# Patient Record
Sex: FEMALE | Race: BLACK OR AFRICAN AMERICAN | NOT HISPANIC OR LATINO | Employment: OTHER | RURAL
[De-identification: names, ages, dates, MRNs, and addresses within clinical notes are randomized per-mention and may not be internally consistent; named-entity substitution may affect disease eponyms.]

---

## 2023-01-15 ENCOUNTER — HOSPITAL ENCOUNTER (EMERGENCY)
Facility: HOSPITAL | Age: 58
Discharge: HOME | End: 2023-01-15
Attending: EMERGENCY MEDICINE
Payer: MEDICARE

## 2023-01-15 VITALS
WEIGHT: 230 LBS | OXYGEN SATURATION: 97 % | HEART RATE: 89 BPM | HEIGHT: 70 IN | DIASTOLIC BLOOD PRESSURE: 83 MMHG | BODY MASS INDEX: 32.93 KG/M2 | TEMPERATURE: 99 F | RESPIRATION RATE: 18 BRPM | SYSTOLIC BLOOD PRESSURE: 118 MMHG

## 2023-01-15 DIAGNOSIS — N39.0 BACTERIAL URINARY INFECTION: ICD-10-CM

## 2023-01-15 DIAGNOSIS — A49.9 BACTERIAL URINARY INFECTION: ICD-10-CM

## 2023-01-15 DIAGNOSIS — L03.211 FACIAL CELLULITIS: Primary | ICD-10-CM

## 2023-01-15 LAB
ALBUMIN SERPL BCP-MCNC: 3.4 G/DL (ref 3.5–5)
ALBUMIN/GLOB SERPL: 0.8 {RATIO}
ALP SERPL-CCNC: 97 U/L (ref 46–118)
ALT SERPL W P-5'-P-CCNC: 150 U/L (ref 13–56)
ANION GAP SERPL CALCULATED.3IONS-SCNC: 14 MMOL/L (ref 7–16)
AST SERPL W P-5'-P-CCNC: 127 U/L (ref 15–37)
BACTERIA #/AREA URNS HPF: ABNORMAL /HPF
BASOPHILS # BLD AUTO: 0 K/UL (ref 0–0.2)
BASOPHILS NFR BLD AUTO: 0 % (ref 0–1)
BILIRUB SERPL-MCNC: 0.7 MG/DL (ref ?–1.2)
BILIRUB UR QL STRIP: ABNORMAL
BUN SERPL-MCNC: 12 MG/DL (ref 7–18)
BUN/CREAT SERPL: 10 (ref 6–20)
CALCIUM SERPL-MCNC: 8.4 MG/DL (ref 8.5–10.1)
CHLORIDE SERPL-SCNC: 104 MMOL/L (ref 98–107)
CLARITY UR: CLEAR
CO2 SERPL-SCNC: 25 MMOL/L (ref 21–32)
COLOR UR: ABNORMAL
CREAT SERPL-MCNC: 1.2 MG/DL (ref 0.55–1.02)
DIFFERENTIAL METHOD BLD: ABNORMAL
EGFR (NO RACE VARIABLE) (RUSH/TITUS): 54 ML/MIN/1.73M²
EOSINOPHIL # BLD AUTO: 0.01 K/UL (ref 0–0.5)
EOSINOPHIL NFR BLD AUTO: 0.1 % (ref 1–4)
ERYTHROCYTE [DISTWIDTH] IN BLOOD BY AUTOMATED COUNT: 16.1 % (ref 11.5–14.5)
GLOBULIN SER-MCNC: 4.4 G/DL (ref 2–4)
GLUCOSE SERPL-MCNC: 122 MG/DL (ref 74–106)
GLUCOSE UR STRIP-MCNC: NEGATIVE MG/DL
HCT VFR BLD AUTO: 43.3 % (ref 38–47)
HGB BLD-MCNC: 14 G/DL (ref 12–16)
IMM GRANULOCYTES # BLD AUTO: 0.01 K/UL (ref 0–0.04)
IMM GRANULOCYTES NFR BLD: 0.1 % (ref 0–0.4)
KETONES UR STRIP-SCNC: NEGATIVE MG/DL
LACTATE SERPL-SCNC: 1.3 MMOL/L (ref 0.4–2)
LACTATE SERPL-SCNC: 3 MMOL/L (ref 0.4–2)
LEUKOCYTE ESTERASE UR QL STRIP: NEGATIVE
LYMPHOCYTES # BLD AUTO: 0.64 K/UL (ref 1–4.8)
LYMPHOCYTES NFR BLD AUTO: 8.7 % (ref 27–41)
MCH RBC QN AUTO: 26.3 PG (ref 27–31)
MCHC RBC AUTO-ENTMCNC: 32.3 G/DL (ref 32–36)
MCV RBC AUTO: 81.2 FL (ref 80–96)
MONOCYTES # BLD AUTO: 0.68 K/UL (ref 0–0.8)
MONOCYTES NFR BLD AUTO: 9.2 % (ref 2–6)
MPC BLD CALC-MCNC: 10.4 FL (ref 9.4–12.4)
NEUTROPHILS # BLD AUTO: 6.05 K/UL (ref 1.8–7.7)
NEUTROPHILS NFR BLD AUTO: 81.9 % (ref 53–65)
NITRITE UR QL STRIP: POSITIVE
PH UR STRIP: 5.5 PH UNITS
PLATELET # BLD AUTO: 254 K/UL (ref 150–400)
POTASSIUM SERPL-SCNC: 3.7 MMOL/L (ref 3.5–5.1)
PROT SERPL-MCNC: 7.8 G/DL (ref 6.4–8.2)
PROT UR QL STRIP: 100
RBC # BLD AUTO: 5.33 M/UL (ref 4.2–5.4)
RBC # UR STRIP: ABNORMAL /UL
RBC #/AREA URNS HPF: ABNORMAL /HPF
SODIUM SERPL-SCNC: 139 MMOL/L (ref 136–145)
SP GR UR STRIP: >=1.03
SQUAMOUS #/AREA URNS LPF: ABNORMAL /LPF
UROBILINOGEN UR STRIP-ACNC: 1 MG/DL
WBC # BLD AUTO: 7.39 K/UL (ref 4.5–11)
WBC #/AREA URNS HPF: ABNORMAL /HPF

## 2023-01-15 PROCEDURE — 63600175 PHARM REV CODE 636 W HCPCS: Performed by: EMERGENCY MEDICINE

## 2023-01-15 PROCEDURE — 96365 THER/PROPH/DIAG IV INF INIT: CPT

## 2023-01-15 PROCEDURE — 85025 COMPLETE CBC W/AUTO DIFF WBC: CPT | Performed by: EMERGENCY MEDICINE

## 2023-01-15 PROCEDURE — 99284 EMERGENCY DEPT VISIT MOD MDM: CPT | Performed by: EMERGENCY MEDICINE

## 2023-01-15 PROCEDURE — 99284 EMERGENCY DEPT VISIT MOD MDM: CPT | Mod: 25

## 2023-01-15 PROCEDURE — 25000003 PHARM REV CODE 250: Performed by: EMERGENCY MEDICINE

## 2023-01-15 PROCEDURE — 80053 COMPREHEN METABOLIC PANEL: CPT | Performed by: EMERGENCY MEDICINE

## 2023-01-15 PROCEDURE — 81001 URINALYSIS AUTO W/SCOPE: CPT | Performed by: EMERGENCY MEDICINE

## 2023-01-15 PROCEDURE — 83605 ASSAY OF LACTIC ACID: CPT | Performed by: EMERGENCY MEDICINE

## 2023-01-15 RX ORDER — ACETAMINOPHEN 325 MG/1
650 TABLET ORAL
Status: COMPLETED | OUTPATIENT
Start: 2023-01-15 | End: 2023-01-15

## 2023-01-15 RX ORDER — ROSUVASTATIN CALCIUM 5 MG/1
5 TABLET, COATED ORAL DAILY
COMMUNITY
Start: 2022-12-20

## 2023-01-15 RX ORDER — PAROXETINE HYDROCHLORIDE 20 MG/1
20 TABLET, FILM COATED ORAL
COMMUNITY
Start: 2022-12-20 | End: 2023-07-09 | Stop reason: SDUPTHER

## 2023-01-15 RX ORDER — METOPROLOL TARTRATE 25 MG/1
25 TABLET, FILM COATED ORAL 2 TIMES DAILY
COMMUNITY

## 2023-01-15 RX ORDER — LOSARTAN POTASSIUM 25 MG/1
25 TABLET ORAL DAILY
COMMUNITY
Start: 2022-12-20

## 2023-01-15 RX ORDER — CLONAZEPAM 1 MG/1
1 TABLET ORAL NIGHTLY PRN
Status: ON HOLD | COMMUNITY
Start: 2022-12-20 | End: 2023-09-01 | Stop reason: SDUPTHER

## 2023-01-15 RX ORDER — SULFAMETHOXAZOLE AND TRIMETHOPRIM 800; 160 MG/1; MG/1
1 TABLET ORAL 2 TIMES DAILY
Qty: 14 TABLET | Refills: 0 | Status: SHIPPED | OUTPATIENT
Start: 2023-01-15 | End: 2023-01-22

## 2023-01-15 RX ADMIN — ACETAMINOPHEN 650 MG: 325 TABLET ORAL at 10:01

## 2023-01-15 RX ADMIN — SODIUM CHLORIDE 1000 ML: 9 INJECTION, SOLUTION INTRAVENOUS at 11:01

## 2023-01-15 RX ADMIN — CEFTRIAXONE SODIUM 1 G: 1 INJECTION, POWDER, FOR SOLUTION INTRAMUSCULAR; INTRAVENOUS at 11:01

## 2023-01-15 NOTE — ED NOTES
Md reviewed all findings and re examined - md spoke with pt and brother- orders for discharge received

## 2023-01-15 NOTE — ED TRIAGE NOTES
PT TO ER WITH C/O R UPPER LIP EDEMA THIS AM - INNER UPPER LIP NOTED WITH DARK BLACK SCAB AND ROTTEN TOOTH NOTED- PT WITH FEVER 101  ON ARRIVAL- R NARES RED WITH SMALL SORE NOTED -PT BROTHER WITH HER AT THIS TIME- BROTHER STATES PT SOME MENTAL ISSUES AND HAS A DETACHED RETINA AND CANNOT SEE WELL- STATES SHE HAS CARETAKER AT HOME

## 2023-01-15 NOTE — ED NOTES
Pt states she is feeling better at this time- continuing to monitor and assist pt- temp down- heart rate improving- pt brother remains with pt

## 2023-01-15 NOTE — ED PROVIDER NOTES
Encounter Date: 1/15/2023       History     Chief Complaint   Patient presents with    Facial Swelling     C/o r upper lip edema this am      Patient presents with report of swelling and erythema of the right upper lip area.  Noted to this morning.  Patient has a mental disability and is mostly blind according to her brother who provides the history.  Patient also has a sore under her right nostril that has been present for at least several days that she is been picking at.  Patient has a caretaker at home.  Has fever this morning 101.     Review of patient's allergies indicates:  No Known Allergies  Past Medical History:   Diagnosis Date    Detached retina     Essential (primary) hypertension     Other specified mental disorders due to known physiological condition      Past Surgical History:   Procedure Laterality Date    HYSTERECTOMY       History reviewed. No pertinent family history.  Social History     Tobacco Use    Smoking status: Never    Smokeless tobacco: Never   Substance Use Topics    Alcohol use: Never     Review of Systems   Constitutional:  Positive for fever. Negative for activity change, appetite change, chills, diaphoresis and fatigue.   HENT:  Positive for dental problem (has poor dentition has 1 single tooth upper right which is decayed completely down to the gingiva) and facial swelling (has swelling of the right upper lip near the right nostril.). Negative for congestion, ear discharge, ear pain, mouth sores, nosebleeds, rhinorrhea, sinus pressure, sinus pain, sore throat and trouble swallowing.    Eyes:  Positive for visual disturbance (patient is essentially blind per her brother.).   Respiratory: Negative.     Cardiovascular: Negative.    Gastrointestinal: Negative.    Genitourinary: Negative.    Musculoskeletal: Negative.    Skin:  Positive for color change (erythema of the right upper lip area.).   Neurological:  Positive for speech difficulty (patient is mostly aphasic at baseline.).    Psychiatric/Behavioral:  Negative for behavioral problems.    All other systems reviewed and are negative.    Physical Exam     Initial Vitals [01/15/23 0940]   BP Pulse Resp Temp SpO2   102/72 (!) 143 20 (!) 101 °F (38.3 °C) 97 %      MAP       --         Physical Exam    Nursing note and vitals reviewed.  Constitutional: She appears well-developed and well-nourished. She is not diaphoretic. No distress.   HENT:   Head: Normocephalic.   Right Ear: External ear normal.   Left Ear: External ear normal.   Mouth/Throat: Oropharynx is clear and moist.   Patient has some erythema and swelling of the right upper lip, with some mucosal erosion of the inner right upper lip, has a tooth that looks like it is irritating that area and the adjacent tooth is completely decayed down to the gingiva.  There is no evidence of periodontal abscess at this time.  The patient has an erosion under the right nostril which is superficial and maybe a source of infection as well as the mucosal erosion which may be a source of infection of the right upper lip.   Eyes: Right eye exhibits no discharge. Left eye exhibits no discharge.   Neck: Neck supple. No JVD present.   Normal range of motion.  Cardiovascular:  Regular rhythm, normal heart sounds and intact distal pulses.   Tachycardia present.         No murmur heard.  Pulmonary/Chest: Breath sounds normal. No stridor. No respiratory distress. She has no wheezes. She has no rhonchi. She has no rales.   Abdominal: Abdomen is soft. Bowel sounds are normal. She exhibits no distension. There is no abdominal tenderness.   Musculoskeletal:         General: No tenderness or edema. Normal range of motion.      Cervical back: Normal range of motion and neck supple.     Lymphadenopathy:     She has no cervical adenopathy.   Neurological: She is alert. She has normal strength. GCS score is 15. GCS eye subscore is 4. GCS verbal subscore is 5. GCS motor subscore is 6.   Skin: Skin is warm and dry.  Capillary refill takes less than 2 seconds. No rash noted. No erythema. No pallor.   Psychiatric: She has a normal mood and affect. Her behavior is normal.       Medical Screening Exam   See Full Note    ED Course   Procedures  Labs Reviewed   COMPREHENSIVE METABOLIC PANEL - Abnormal; Notable for the following components:       Result Value    Glucose 122 (*)     Creatinine 1.20 (*)     Calcium 8.4 (*)     Albumin 3.4 (*)     Globulin 4.4 (*)      (*)      (*)     eGFR 54 (*)     All other components within normal limits   LACTIC ACID, PLASMA - Abnormal; Notable for the following components:    Lactic Acid 3.0 (*)     All other components within normal limits   URINALYSIS, REFLEX TO URINE CULTURE - Abnormal; Notable for the following components:    Nitrites, UA Positive (*)     Protein,  (*)     Bilirubin, UA Small (*)     Blood, UA Small (*)     Specific Gravity, UA >=1.030 (*)     All other components within normal limits   CBC WITH DIFFERENTIAL - Abnormal; Notable for the following components:    MCH 26.3 (*)     RDW 16.1 (*)     Neutrophils % 81.9 (*)     Lymphocytes % 8.7 (*)     Lymphocytes, Absolute 0.64 (*)     Monocytes % 9.2 (*)     Eosinophils % 0.1 (*)     All other components within normal limits   URINALYSIS, MICROSCOPIC - Abnormal; Notable for the following components:    RBC, UA 5-10 (*)     Bacteria, UA Few (*)     All other components within normal limits   LACTIC ACID, PLASMA - Normal   CBC W/ AUTO DIFFERENTIAL    Narrative:     The following orders were created for panel order CBC auto differential.  Procedure                               Abnormality         Status                     ---------                               -----------         ------                     CBC with Differential[[id number]]        Abnormal            Final result                 Please view results for these tests on the individual orders.          Imaging Results    None          Medications    sodium chloride 0.9% bolus 1,000 mL 1,000 mL (0 mLs Intravenous Stopped 1/15/23 1215)   cefTRIAXone (ROCEPHIN) 1 g in dextrose 5 % in water (D5W) 5 % 50 mL IVPB (MB+) (0 g Intravenous Stopped 1/15/23 1210)   acetaminophen tablet 650 mg (650 mg Oral Given 1/15/23 1052)     Medical Decision Making:   ED Management:  Patient was treated with Tylenol, IV fluids, and IV Rocephin.                  Clinical Impression:   Final diagnoses:  [L03.211] Facial cellulitis (Primary)  [N39.0, A49.9] Bacterial urinary infection        ED Disposition Condition    Discharge Stable          ED Prescriptions       Medication Sig Dispense Start Date End Date Auth. Provider    sulfamethoxazole-trimethoprim 800-160mg (BACTRIM DS) 800-160 mg Tab Take 1 tablet by mouth 2 (two) times daily. for 7 days 14 tablet 1/15/2023 1/22/2023 Jose Walker DO          Follow-up Information       Follow up With Specialties Details Why Contact Info    Warner Collazo DO Family Medicine Schedule an appointment as soon as possible for a visit  Or do a virtual visit, to recheck; sooner if worse or not improving. [address] [facility] [address]  [phone]               Jose Walker DO  01/15/23 1714

## 2023-07-09 ENCOUNTER — HOSPITAL ENCOUNTER (EMERGENCY)
Facility: HOSPITAL | Age: 58
Discharge: HOME | End: 2023-07-09
Attending: FAMILY MEDICINE
Payer: MEDICARE

## 2023-07-09 VITALS
SYSTOLIC BLOOD PRESSURE: 106 MMHG | DIASTOLIC BLOOD PRESSURE: 76 MMHG | BODY MASS INDEX: 25.91 KG/M2 | OXYGEN SATURATION: 96 % | HEART RATE: 88 BPM | TEMPERATURE: 99 F | HEIGHT: 70 IN | RESPIRATION RATE: 26 BRPM | WEIGHT: 181 LBS

## 2023-07-09 DIAGNOSIS — R06.02 SHORTNESS OF BREATH: ICD-10-CM

## 2023-07-09 DIAGNOSIS — N30.01 ACUTE CYSTITIS WITH HEMATURIA: Primary | ICD-10-CM

## 2023-07-09 LAB
ALBUMIN SERPL BCP-MCNC: 3.1 G/DL (ref 3.5–5)
ALBUMIN/GLOB SERPL: 0.9 {RATIO}
ALP SERPL-CCNC: 88 U/L (ref 46–118)
ALT SERPL W P-5'-P-CCNC: 98 U/L (ref 13–56)
ANION GAP SERPL CALCULATED.3IONS-SCNC: 13 MMOL/L (ref 7–16)
AST SERPL W P-5'-P-CCNC: 36 U/L (ref 15–37)
BASOPHILS # BLD AUTO: 0 K/UL (ref 0–0.2)
BASOPHILS NFR BLD AUTO: 0 % (ref 0–1)
BILIRUB SERPL-MCNC: 0.8 MG/DL (ref ?–1.2)
BUN SERPL-MCNC: 20 MG/DL (ref 7–18)
BUN/CREAT SERPL: 26 (ref 6–20)
CALCIUM SERPL-MCNC: 9.3 MG/DL (ref 8.5–10.1)
CHLORIDE SERPL-SCNC: 108 MMOL/L (ref 98–107)
CO2 SERPL-SCNC: 25 MMOL/L (ref 21–32)
CREAT SERPL-MCNC: 0.76 MG/DL (ref 0.55–1.02)
DIFFERENTIAL METHOD BLD: ABNORMAL
EGFR (NO RACE VARIABLE) (RUSH/TITUS): 93 ML/MIN/1.73M2
EOSINOPHIL # BLD AUTO: 0 K/UL (ref 0–0.5)
EOSINOPHIL NFR BLD AUTO: 0 % (ref 1–4)
ERYTHROCYTE [DISTWIDTH] IN BLOOD BY AUTOMATED COUNT: 13.1 % (ref 11.5–14.5)
FLUAV AG UPPER RESP QL IA.RAPID: NEGATIVE
FLUBV AG UPPER RESP QL IA.RAPID: NEGATIVE
GLOBULIN SER-MCNC: 3.5 G/DL (ref 2–4)
GLUCOSE SERPL-MCNC: 121 MG/DL (ref 74–106)
HCT VFR BLD AUTO: 38.2 % (ref 38–47)
HGB BLD-MCNC: 12.3 G/DL (ref 12–16)
IMM GRANULOCYTES # BLD AUTO: 0.01 K/UL (ref 0–0.04)
IMM GRANULOCYTES NFR BLD: 0.1 % (ref 0–0.4)
LYMPHOCYTES # BLD AUTO: 0.79 K/UL (ref 1–4.8)
LYMPHOCYTES NFR BLD AUTO: 8.7 % (ref 27–41)
MCH RBC QN AUTO: 25.5 PG (ref 27–31)
MCHC RBC AUTO-ENTMCNC: 32.2 G/DL (ref 32–36)
MCV RBC AUTO: 79.1 FL (ref 80–96)
MONOCYTES # BLD AUTO: 0.53 K/UL (ref 0–0.8)
MONOCYTES NFR BLD AUTO: 5.9 % (ref 2–6)
MPC BLD CALC-MCNC: 11.1 FL (ref 9.4–12.4)
NEUTROPHILS # BLD AUTO: 7.72 K/UL (ref 1.8–7.7)
NEUTROPHILS NFR BLD AUTO: 85.3 % (ref 53–65)
NT-PROBNP SERPL-MCNC: 213 PG/ML (ref 1–125)
PLATELET # BLD AUTO: 353 K/UL (ref 150–400)
POTASSIUM SERPL-SCNC: 4.4 MMOL/L (ref 3.5–5.1)
PROT SERPL-MCNC: 6.6 G/DL (ref 6.4–8.2)
RBC # BLD AUTO: 4.83 M/UL (ref 4.2–5.4)
SARS-COV-2 RDRP RESP QL NAA+PROBE: NEGATIVE
SODIUM SERPL-SCNC: 142 MMOL/L (ref 136–145)
TROPONIN I SERPL DL<=0.01 NG/ML-MCNC: 20.7 PG/ML
WBC # BLD AUTO: 9.05 K/UL (ref 4.5–11)

## 2023-07-09 PROCEDURE — 83880 ASSAY OF NATRIURETIC PEPTIDE: CPT | Performed by: FAMILY MEDICINE

## 2023-07-09 PROCEDURE — 96365 THER/PROPH/DIAG IV INF INIT: CPT

## 2023-07-09 PROCEDURE — 87804 INFLUENZA ASSAY W/OPTIC: CPT | Performed by: FAMILY MEDICINE

## 2023-07-09 PROCEDURE — 87635 SARS-COV-2 COVID-19 AMP PRB: CPT | Performed by: FAMILY MEDICINE

## 2023-07-09 PROCEDURE — 93010 ELECTROCARDIOGRAM REPORT: CPT | Mod: ,,, | Performed by: INTERNAL MEDICINE

## 2023-07-09 PROCEDURE — 99285 EMERGENCY DEPT VISIT HI MDM: CPT | Mod: 25

## 2023-07-09 PROCEDURE — 99284 EMERGENCY DEPT VISIT MOD MDM: CPT | Performed by: FAMILY MEDICINE

## 2023-07-09 PROCEDURE — 63600175 PHARM REV CODE 636 W HCPCS: Performed by: FAMILY MEDICINE

## 2023-07-09 PROCEDURE — 93005 ELECTROCARDIOGRAM TRACING: CPT

## 2023-07-09 PROCEDURE — 93010 EKG 12-LEAD: ICD-10-PCS | Mod: ,,, | Performed by: INTERNAL MEDICINE

## 2023-07-09 PROCEDURE — 25000003 PHARM REV CODE 250: Performed by: FAMILY MEDICINE

## 2023-07-09 PROCEDURE — 85025 COMPLETE CBC W/AUTO DIFF WBC: CPT | Performed by: FAMILY MEDICINE

## 2023-07-09 PROCEDURE — 96375 TX/PRO/DX INJ NEW DRUG ADDON: CPT

## 2023-07-09 PROCEDURE — 80053 COMPREHEN METABOLIC PANEL: CPT | Performed by: FAMILY MEDICINE

## 2023-07-09 PROCEDURE — 84484 ASSAY OF TROPONIN QUANT: CPT | Performed by: FAMILY MEDICINE

## 2023-07-09 RX ORDER — FUROSEMIDE 10 MG/ML
40 INJECTION INTRAMUSCULAR; INTRAVENOUS ONCE
Status: COMPLETED | OUTPATIENT
Start: 2023-07-09 | End: 2023-07-09

## 2023-07-09 RX ORDER — SULFAMETHOXAZOLE AND TRIMETHOPRIM 800; 160 MG/1; MG/1
1 TABLET ORAL
Status: COMPLETED | OUTPATIENT
Start: 2023-07-09 | End: 2023-07-09

## 2023-07-09 RX ORDER — NITROFURANTOIN 25; 75 MG/1; MG/1
100 CAPSULE ORAL 2 TIMES DAILY
Qty: 10 CAPSULE | Refills: 0 | Status: SHIPPED | OUTPATIENT
Start: 2023-07-09 | End: 2023-07-14

## 2023-07-09 RX ORDER — NITROFURANTOIN 25; 75 MG/1; MG/1
100 CAPSULE ORAL 2 TIMES DAILY
Qty: 10 CAPSULE | Refills: 0 | Status: SHIPPED | OUTPATIENT
Start: 2023-07-09 | End: 2023-07-09 | Stop reason: ALTCHOICE

## 2023-07-09 RX ADMIN — SULFAMETHOXAZOLE AND TRIMETHOPRIM 1 TABLET: 800; 160 TABLET ORAL at 05:07

## 2023-07-09 RX ADMIN — CEFTRIAXONE SODIUM 1 G: 1 INJECTION, POWDER, FOR SOLUTION INTRAMUSCULAR; INTRAVENOUS at 05:07

## 2023-07-09 RX ADMIN — FUROSEMIDE 40 MG: 10 INJECTION, SOLUTION INTRAVENOUS at 05:07

## 2023-07-09 NOTE — ED TRIAGE NOTES
Pt brought to er by brother for c/o swelling of ble that had not been previously noticed. States that pt has been having increased difficulty ambulating. Pt is legally blind and lives with caregiver. Pt is mentally incompetent. Denies pain. Denies shortness of breath.

## 2023-07-09 NOTE — ED PROVIDER NOTES
Encounter Date: 7/9/2023       History     Chief Complaint   Patient presents with    Leg Swelling     57 year old female brought to ER via POV by her brother, for SOB, LE edema and weakness. Patient is blind. PMH significant for HTN. MENTAL ILLNESS.    The history is provided by the patient and a relative. The history is limited by the condition of the patient. No  was used.   Review of patient's allergies indicates:  No Known Allergies  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      Past Surgical History:   Procedure Laterality Date    EYE SURGERY      HYSTERECTOMY       History reviewed. No pertinent family history.  Social History     Tobacco Use    Smoking status: Never    Smokeless tobacco: Never   Substance Use Topics    Alcohol use: Never     Review of Systems   Constitutional:  Positive for activity change, appetite change and fatigue.   HENT: Negative.     Eyes: Negative.    Respiratory: Negative.     Cardiovascular:  Positive for leg swelling.   Gastrointestinal: Negative.    Endocrine: Negative.    Musculoskeletal:  Positive for gait problem.   Skin: Negative.    Allergic/Immunologic: Negative.    Neurological:  Positive for weakness.   Hematological: Negative.    Psychiatric/Behavioral:  The patient is nervous/anxious.      Physical Exam     Initial Vitals [07/09/23 1644]   BP Pulse Resp Temp SpO2   109/74 102 (!) 32 99.1 °F (37.3 °C) 97 %      MAP       --         Physical Exam    Nursing note and vitals reviewed.  Constitutional: She appears well-developed and well-nourished.   HENT:   Head: Normocephalic and atraumatic.   Eyes: Conjunctivae and EOM are normal. Pupils are equal, round, and reactive to light.   Neck: Neck supple.   Normal range of motion.  Cardiovascular:  Normal rate, regular rhythm and normal heart sounds.            Pulmonary/Chest: She has rales.   Abdominal: Abdomen is soft. Bowel sounds are normal.   Musculoskeletal:      Cervical back: Normal range of motion and neck supple.     Neurological: She is alert and oriented to person, place, and time. She has normal strength and normal reflexes. GCS score is 15. GCS eye subscore is 4. GCS verbal subscore is 5. GCS motor subscore is 6.   Skin: Skin is warm and dry. Capillary refill takes less than 2 seconds.       Medical Screening Exam   See Full Note    ED Course   Procedures  Labs Reviewed   CBC WITH DIFFERENTIAL - Abnormal; Notable for the following components:       Result Value    MCV 79.1 (*)     MCH 25.5 (*)     Neutrophils % 85.3 (*)     Lymphocytes % 8.7 (*)     Neutrophils, Abs 7.72 (*)     Lymphocytes, Absolute 0.79 (*)     Eosinophils % 0.0 (*)     All other components within normal limits   RAPID INFLUENZA A/B   CBC W/ AUTO DIFFERENTIAL    Narrative:     The following orders were created for panel order CBC auto differential.  Procedure                               Abnormality         Status                     ---------                               -----------         ------                     CBC with Differential[[id number]]        Abnormal            Final result                 Please view results for these tests on the individual orders.   COMPREHENSIVE METABOLIC PANEL   TROPONIN I   NT-PRO NATRIURETIC PEPTIDE   SARS-COV-2 RNA AMPLIFICATION, QUAL        ECG Results              EKG 12-lead (In process)  Result time 07/09/23 17:00:46      In process by Interface, Lab In [facility] (07/09/23 17:00:46)                   Narrative:    Test Reason : R06.02,    Vent. Rate : 095 BPM     Atrial Rate : 095 BPM     P-R Int : 134 ms          QRS Dur : 074 ms      QT Int : 370 ms       P-R-T Axes : 060 059 013 degrees     QTc Int : 464 ms    Normal sinus rhythm  ST and T wave abnormality, consider anterior ischemia  Prolonged QT  Abnormal ECG  No previous ECGs  available    Referred By: [email or web address]   SELF           Confirmed By:                                   Imaging Results              X-Ray Chest AP Portable (Final result)  Result time 07/09/23 17:33:10      Final result by Jag Tran MD (07/09/23 17:33:10)                   Impression:      No acute process      Electronically signed by: Jag Tran  Date:    07/09/2023  Time:    17:33               Narrative:    EXAMINATION:  XR CHEST AP PORTABLE    CLINICAL HISTORY:  CHF;.    COMPARISON:  No previous similar    TECHNIQUE:  AP portable upright chest    FINDINGS:  Cardiac silhouette is upper normal.  There is no mediastinal mass.  There is no pulmonary vascular engorgement.    Lungs are generally clear for shallow breath.  There is a calcified granuloma left upper lung.  There is no gross layering pleural effusion.    There is no acute osseous abnormality                                       Medications   cefTRIAXone (ROCEPHIN) 1 g in dextrose 5 % in water (D5W) 5 % 100 mL IVPB (MB+) (1 g Intravenous New Bag 7/9/23 1711)   furosemide injection 40 mg (40 mg Intravenous Given 7/9/23 1711)   sulfamethoxazole-trimethoprim 800-160mg per tablet 1 tablet (1 tablet Oral Given 7/9/23 1730)                             Clinical Impression:   Final diagnoses:  [R06.02] Shortness of breath  [N30.01] Acute cystitis with hematuria (Primary)        ED Disposition Condition    Discharge Stable          ED Prescriptions       Medication Sig Dispense Start Date End Date Auth. Provider    nitrofurantoin, macrocrystal-monohydrate, (MACROBID) 100 MG capsule Take 1 capsule (100 mg total) by mouth 2 (two) times daily. for 5 days 10 capsule 7/9/2023 7/14/2023 Melba Buckley MD          Follow-up Information       Follow up With Specialties Details Why Contact Info    Elaine Clark MD Family Medicine In 1 day for recheck [address] [facility] [id number] [phone]               Melba Buckley MD  07/09/23 3395

## 2023-08-18 ENCOUNTER — HOSPITAL ENCOUNTER (EMERGENCY)
Facility: HOSPITAL | Age: 58
Discharge: ANOTHER HOSPITAL | End: 2023-08-19
Attending: FAMILY MEDICINE
Payer: MEDICARE

## 2023-08-18 DIAGNOSIS — R25.1 TREMOR OF UNKNOWN ORIGIN: ICD-10-CM

## 2023-08-18 DIAGNOSIS — R06.02 SHORTNESS OF BREATH: ICD-10-CM

## 2023-08-18 DIAGNOSIS — D72.829 LEUKOCYTOSIS, UNSPECIFIED TYPE: ICD-10-CM

## 2023-08-18 DIAGNOSIS — H25.013 CORTICAL AGE-RELATED CATARACT OF BOTH EYES: Primary | ICD-10-CM

## 2023-08-18 DIAGNOSIS — R53.1 GENERALIZED WEAKNESS: ICD-10-CM

## 2023-08-18 DIAGNOSIS — R79.89 ELEVATED TROPONIN: ICD-10-CM

## 2023-08-18 LAB
ALBUMIN SERPL BCP-MCNC: 3.2 G/DL (ref 3.5–5)
ALBUMIN/GLOB SERPL: 0.9 {RATIO}
ALP SERPL-CCNC: 95 U/L (ref 46–118)
ALT SERPL W P-5'-P-CCNC: 60 U/L (ref 13–56)
ANION GAP SERPL CALCULATED.3IONS-SCNC: 21 MMOL/L (ref 7–16)
AST SERPL W P-5'-P-CCNC: 44 U/L (ref 15–37)
BACTERIA #/AREA URNS HPF: NORMAL /HPF
BASOPHILS # BLD AUTO: 0 K/UL (ref 0–0.2)
BASOPHILS NFR BLD AUTO: 0 % (ref 0–1)
BILIRUB SERPL-MCNC: 1.1 MG/DL (ref ?–1.2)
BILIRUB UR QL STRIP: NEGATIVE
BUN SERPL-MCNC: 27 MG/DL (ref 7–18)
BUN/CREAT SERPL: 23 (ref 6–20)
CALCIUM SERPL-MCNC: 9.4 MG/DL (ref 8.5–10.1)
CHLORIDE SERPL-SCNC: 107 MMOL/L (ref 98–107)
CLARITY UR: CLEAR
CO2 SERPL-SCNC: 20 MMOL/L (ref 21–32)
COLOR UR: YELLOW
CREAT SERPL-MCNC: 1.18 MG/DL (ref 0.55–1.02)
DIFFERENTIAL METHOD BLD: ABNORMAL
EGFR (NO RACE VARIABLE) (RUSH/TITUS): 55 ML/MIN/1.73M2
EOSINOPHIL # BLD AUTO: 0 K/UL (ref 0–0.5)
EOSINOPHIL NFR BLD AUTO: 0 % (ref 1–4)
ERYTHROCYTE [DISTWIDTH] IN BLOOD BY AUTOMATED COUNT: 13.1 % (ref 11.5–14.5)
GLOBULIN SER-MCNC: 3.6 G/DL (ref 2–4)
GLUCOSE SERPL-MCNC: 163 MG/DL (ref 74–106)
GLUCOSE UR STRIP-MCNC: NEGATIVE MG/DL
HCT VFR BLD AUTO: 39.5 % (ref 38–47)
HGB BLD-MCNC: 12.6 G/DL (ref 12–16)
IMM GRANULOCYTES # BLD AUTO: 0.04 K/UL (ref 0–0.04)
IMM GRANULOCYTES NFR BLD: 0.3 % (ref 0–0.4)
KETONES UR STRIP-SCNC: 15 MG/DL
LEUKOCYTE ESTERASE UR QL STRIP: NEGATIVE
LYMPHOCYTES # BLD AUTO: 0.88 K/UL (ref 1–4.8)
LYMPHOCYTES NFR BLD AUTO: 6.2 % (ref 27–41)
MCH RBC QN AUTO: 24.4 PG (ref 27–31)
MCHC RBC AUTO-ENTMCNC: 31.9 G/DL (ref 32–36)
MCV RBC AUTO: 76.6 FL (ref 80–96)
MONOCYTES # BLD AUTO: 1.01 K/UL (ref 0–0.8)
MONOCYTES NFR BLD AUTO: 7.1 % (ref 2–6)
MPC BLD CALC-MCNC: 11.7 FL (ref 9.4–12.4)
NEUTROPHILS # BLD AUTO: 12.32 K/UL (ref 1.8–7.7)
NEUTROPHILS NFR BLD AUTO: 86.4 % (ref 53–65)
NITRITE UR QL STRIP: NEGATIVE
PH UR STRIP: 5.5 PH UNITS
PLATELET # BLD AUTO: 357 K/UL (ref 150–400)
POTASSIUM SERPL-SCNC: 3.6 MMOL/L (ref 3.5–5.1)
PROT SERPL-MCNC: 6.8 G/DL (ref 6.4–8.2)
PROT UR QL STRIP: 30
RBC # BLD AUTO: 5.16 M/UL (ref 4.2–5.4)
RBC # UR STRIP: NEGATIVE /UL
RBC #/AREA URNS HPF: NORMAL /HPF
SODIUM SERPL-SCNC: 144 MMOL/L (ref 136–145)
SP GR UR STRIP: 1.02
UROBILINOGEN UR STRIP-ACNC: 1 MG/DL
WBC # BLD AUTO: 14.25 K/UL (ref 4.5–11)
WBC #/AREA URNS HPF: NORMAL /HPF

## 2023-08-18 PROCEDURE — 99284 EMERGENCY DEPT VISIT MOD MDM: CPT | Mod: 25 | Performed by: FAMILY MEDICINE

## 2023-08-18 PROCEDURE — 99285 EMERGENCY DEPT VISIT HI MDM: CPT

## 2023-08-18 PROCEDURE — 85025 COMPLETE CBC W/AUTO DIFF WBC: CPT | Performed by: FAMILY MEDICINE

## 2023-08-18 PROCEDURE — 96361 HYDRATE IV INFUSION ADD-ON: CPT

## 2023-08-18 PROCEDURE — 25000003 PHARM REV CODE 250: Performed by: FAMILY MEDICINE

## 2023-08-18 PROCEDURE — 80053 COMPREHEN METABOLIC PANEL: CPT | Performed by: FAMILY MEDICINE

## 2023-08-18 PROCEDURE — 81001 URINALYSIS AUTO W/SCOPE: CPT | Performed by: FAMILY MEDICINE

## 2023-08-18 RX ORDER — PAROXETINE HYDROCHLORIDE 20 MG/1
1 TABLET, FILM COATED ORAL DAILY
COMMUNITY

## 2023-08-18 RX ORDER — FUROSEMIDE 20 MG/1
1 TABLET ORAL
Status: ON HOLD | COMMUNITY
Start: 2023-07-11 | End: 2023-09-01 | Stop reason: HOSPADM

## 2023-08-18 RX ORDER — CALCIUM CARB, CITRATE, MALATE 250 MG
1 CAPSULE ORAL
Status: ON HOLD | COMMUNITY
Start: 2023-07-11 | End: 2023-09-01 | Stop reason: HOSPADM

## 2023-08-18 RX ORDER — SODIUM CHLORIDE 9 MG/ML
500 INJECTION, SOLUTION INTRAVENOUS
Status: COMPLETED | OUTPATIENT
Start: 2023-08-18 | End: 2023-08-18

## 2023-08-18 RX ADMIN — SODIUM CHLORIDE 500 ML: 9 INJECTION, SOLUTION INTRAVENOUS at 08:08

## 2023-08-19 ENCOUNTER — HOSPITAL ENCOUNTER (INPATIENT)
Facility: HOSPITAL | Age: 58
LOS: 6 days | Discharge: NURSING, REHAB OR LONG-TERM CARE | DRG: 948 | End: 2023-08-25
Attending: INTERNAL MEDICINE | Admitting: INTERNAL MEDICINE
Payer: MEDICARE

## 2023-08-19 VITALS
SYSTOLIC BLOOD PRESSURE: 106 MMHG | TEMPERATURE: 99 F | RESPIRATION RATE: 27 BRPM | HEIGHT: 70 IN | WEIGHT: 172.38 LBS | OXYGEN SATURATION: 98 % | BODY MASS INDEX: 24.68 KG/M2 | HEART RATE: 92 BPM | DIASTOLIC BLOOD PRESSURE: 61 MMHG

## 2023-08-19 DIAGNOSIS — R79.89 ELEVATED TROPONIN: ICD-10-CM

## 2023-08-19 DIAGNOSIS — R07.9 CHEST PAIN: ICD-10-CM

## 2023-08-19 DIAGNOSIS — I25.10 CAD (CORONARY ARTERY DISEASE): ICD-10-CM

## 2023-08-19 DIAGNOSIS — E06.9 THYROIDITIS: Primary | ICD-10-CM

## 2023-08-19 PROBLEM — R06.02 SHORTNESS OF BREATH: Status: ACTIVE | Noted: 2023-08-19

## 2023-08-19 PROBLEM — R53.1 GENERALIZED WEAKNESS: Status: ACTIVE | Noted: 2023-08-19

## 2023-08-19 PROBLEM — D72.829 LEUKOCYTOSIS: Status: ACTIVE | Noted: 2023-08-19

## 2023-08-19 PROBLEM — H25.013 CORTICAL AGE-RELATED CATARACT OF BOTH EYES: Status: ACTIVE | Noted: 2023-08-19

## 2023-08-19 PROBLEM — I95.9 HYPOTENSION: Status: ACTIVE | Noted: 2023-08-19

## 2023-08-19 PROBLEM — F79 MENTAL DISABILITY: Status: ACTIVE | Noted: 2023-08-19

## 2023-08-19 PROBLEM — R25.1 TREMOR OF UNKNOWN ORIGIN: Status: ACTIVE | Noted: 2023-08-19

## 2023-08-19 LAB
ANION GAP SERPL CALCULATED.3IONS-SCNC: 12 MMOL/L (ref 7–16)
BASOPHILS # BLD AUTO: 0.01 K/UL (ref 0–0.2)
BASOPHILS NFR BLD AUTO: 0.1 % (ref 0–1)
BUN SERPL-MCNC: 16 MG/DL (ref 7–18)
BUN/CREAT SERPL: 21 (ref 6–20)
CALCIUM SERPL-MCNC: 8.6 MG/DL (ref 8.5–10.1)
CHLORIDE SERPL-SCNC: 111 MMOL/L (ref 98–107)
CHOLEST SERPL-MCNC: 72 MG/DL (ref 0–200)
CHOLEST/HDLC SERPL: 1.8 {RATIO}
CO2 SERPL-SCNC: 25 MMOL/L (ref 21–32)
CREAT SERPL-MCNC: 0.75 MG/DL (ref 0.55–1.02)
CRP SERPL-MCNC: 8.84 MG/DL (ref 0–0.8)
DIFFERENTIAL METHOD BLD: ABNORMAL
EGFR (NO RACE VARIABLE) (RUSH/TITUS): 94 ML/MIN/1.73M2
EOSINOPHIL # BLD AUTO: 0.05 K/UL (ref 0–0.5)
EOSINOPHIL NFR BLD AUTO: 0.7 % (ref 1–4)
ERYTHROCYTE [DISTWIDTH] IN BLOOD BY AUTOMATED COUNT: 13 % (ref 11.5–14.5)
EST. AVERAGE GLUCOSE BLD GHB EST-MCNC: 81 MG/DL
GLUCOSE SERPL-MCNC: 168 MG/DL (ref 74–106)
HBA1C MFR BLD HPLC: 5 % (ref 4.5–6.6)
HCT VFR BLD AUTO: 31.8 % (ref 38–47)
HDLC SERPL-MCNC: 41 MG/DL (ref 40–60)
HGB BLD-MCNC: 10.3 G/DL (ref 12–16)
IMM GRANULOCYTES # BLD AUTO: 0.01 K/UL (ref 0–0.04)
IMM GRANULOCYTES NFR BLD: 0.1 % (ref 0–0.4)
LACTATE SERPL-SCNC: 0.6 MMOL/L (ref 0.4–2)
LDLC SERPL CALC-MCNC: 19 MG/DL
LYMPHOCYTES # BLD AUTO: 1.02 K/UL (ref 1–4.8)
LYMPHOCYTES NFR BLD AUTO: 14.1 % (ref 27–41)
MCH RBC QN AUTO: 24.7 PG (ref 27–31)
MCHC RBC AUTO-ENTMCNC: 32.4 G/DL (ref 32–36)
MCV RBC AUTO: 76.3 FL (ref 80–96)
MONOCYTES # BLD AUTO: 0.72 K/UL (ref 0–0.8)
MONOCYTES NFR BLD AUTO: 10 % (ref 2–6)
MPC BLD CALC-MCNC: 10.7 FL (ref 9.4–12.4)
NEUTROPHILS # BLD AUTO: 5.41 K/UL (ref 1.8–7.7)
NEUTROPHILS NFR BLD AUTO: 75 % (ref 53–65)
NONHDLC SERPL-MCNC: 31 MG/DL
NT-PROBNP SERPL-MCNC: 615 PG/ML (ref 1–125)
PLATELET # BLD AUTO: 281 K/UL (ref 150–400)
POTASSIUM SERPL-SCNC: 3.1 MMOL/L (ref 3.5–5.1)
RBC # BLD AUTO: 4.17 M/UL (ref 4.2–5.4)
SODIUM SERPL-SCNC: 145 MMOL/L (ref 136–145)
TRIGL SERPL-MCNC: 60 MG/DL (ref 35–150)
TROPONIN I SERPL DL<=0.01 NG/ML-MCNC: 543.7 PG/ML
TROPONIN I SERPL DL<=0.01 NG/ML-MCNC: 686.3 PG/ML
TROPONIN I SERPL DL<=0.01 NG/ML-MCNC: 698.9 PG/ML
TROPONIN I SERPL DL<=0.01 NG/ML-MCNC: 926.7 PG/ML
TSH SERPL DL<=0.005 MIU/L-ACNC: <0.005 UIU/ML (ref 0.36–3.74)
VLDLC SERPL-MCNC: 12 MG/DL
WBC # BLD AUTO: 7.22 K/UL (ref 4.5–11)

## 2023-08-19 PROCEDURE — 86140 C-REACTIVE PROTEIN: CPT | Performed by: FAMILY MEDICINE

## 2023-08-19 PROCEDURE — 93010 EKG 12-LEAD: ICD-10-PCS | Mod: ,,, | Performed by: INTERNAL MEDICINE

## 2023-08-19 PROCEDURE — 25000003 PHARM REV CODE 250: Performed by: FAMILY MEDICINE

## 2023-08-19 PROCEDURE — 85025 COMPLETE CBC W/AUTO DIFF WBC: CPT | Performed by: FAMILY MEDICINE

## 2023-08-19 PROCEDURE — 84484 ASSAY OF TROPONIN QUANT: CPT | Performed by: GENERAL PRACTICE

## 2023-08-19 PROCEDURE — 83880 ASSAY OF NATRIURETIC PEPTIDE: CPT | Performed by: FAMILY MEDICINE

## 2023-08-19 PROCEDURE — 84443 ASSAY THYROID STIM HORMONE: CPT | Performed by: GENERAL PRACTICE

## 2023-08-19 PROCEDURE — 93010 ELECTROCARDIOGRAM REPORT: CPT | Mod: ,,, | Performed by: INTERNAL MEDICINE

## 2023-08-19 PROCEDURE — 80061 LIPID PANEL: CPT | Performed by: GENERAL PRACTICE

## 2023-08-19 PROCEDURE — 63600175 PHARM REV CODE 636 W HCPCS: Performed by: FAMILY MEDICINE

## 2023-08-19 PROCEDURE — 83605 ASSAY OF LACTIC ACID: CPT | Performed by: FAMILY MEDICINE

## 2023-08-19 PROCEDURE — 93005 ELECTROCARDIOGRAM TRACING: CPT

## 2023-08-19 PROCEDURE — 11000001 HC ACUTE MED/SURG PRIVATE ROOM

## 2023-08-19 PROCEDURE — 94761 N-INVAS EAR/PLS OXIMETRY MLT: CPT

## 2023-08-19 PROCEDURE — 84484 ASSAY OF TROPONIN QUANT: CPT | Mod: 91 | Performed by: FAMILY MEDICINE

## 2023-08-19 PROCEDURE — 25000003 PHARM REV CODE 250: Performed by: GENERAL PRACTICE

## 2023-08-19 PROCEDURE — 96365 THER/PROPH/DIAG IV INF INIT: CPT

## 2023-08-19 PROCEDURE — 84484 ASSAY OF TROPONIN QUANT: CPT | Performed by: FAMILY MEDICINE

## 2023-08-19 PROCEDURE — 80048 BASIC METABOLIC PNL TOTAL CA: CPT | Performed by: FAMILY MEDICINE

## 2023-08-19 PROCEDURE — 83036 HEMOGLOBIN GLYCOSYLATED A1C: CPT | Performed by: GENERAL PRACTICE

## 2023-08-19 PROCEDURE — 25000003 PHARM REV CODE 250

## 2023-08-19 PROCEDURE — 93010 ELECTROCARDIOGRAM REPORT: CPT | Mod: 77,,, | Performed by: INTERNAL MEDICINE

## 2023-08-19 PROCEDURE — 96375 TX/PRO/DX INJ NEW DRUG ADDON: CPT

## 2023-08-19 PROCEDURE — 93010 EKG 12-LEAD: ICD-10-PCS | Mod: 77,,, | Performed by: INTERNAL MEDICINE

## 2023-08-19 RX ORDER — PAROXETINE HYDROCHLORIDE 20 MG/1
20 TABLET, FILM COATED ORAL DAILY
Status: DISCONTINUED | OUTPATIENT
Start: 2023-08-20 | End: 2023-08-25 | Stop reason: HOSPADM

## 2023-08-19 RX ORDER — ONDANSETRON 2 MG/ML
4 INJECTION INTRAMUSCULAR; INTRAVENOUS EVERY 8 HOURS PRN
Status: DISCONTINUED | OUTPATIENT
Start: 2023-08-19 | End: 2023-08-25 | Stop reason: HOSPADM

## 2023-08-19 RX ORDER — POLYETHYLENE GLYCOL 3350 17 G/17G
17 POWDER, FOR SOLUTION ORAL 2 TIMES DAILY PRN
Status: DISCONTINUED | OUTPATIENT
Start: 2023-08-19 | End: 2023-08-25 | Stop reason: HOSPADM

## 2023-08-19 RX ORDER — NALOXONE HCL 0.4 MG/ML
0.02 VIAL (ML) INJECTION
Status: DISCONTINUED | OUTPATIENT
Start: 2023-08-19 | End: 2023-08-25 | Stop reason: HOSPADM

## 2023-08-19 RX ORDER — CLONAZEPAM 0.5 MG/1
1 TABLET ORAL
Status: COMPLETED | OUTPATIENT
Start: 2023-08-19 | End: 2023-08-19

## 2023-08-19 RX ORDER — ATORVASTATIN CALCIUM 40 MG/1
TABLET, FILM COATED ORAL
Status: COMPLETED
Start: 2023-08-19 | End: 2023-08-19

## 2023-08-19 RX ORDER — METOPROLOL TARTRATE 1 MG/ML
2.5 INJECTION, SOLUTION INTRAVENOUS EVERY 12 HOURS
Status: DISCONTINUED | OUTPATIENT
Start: 2023-08-19 | End: 2023-08-19

## 2023-08-19 RX ORDER — SODIUM CHLORIDE 0.9 % (FLUSH) 0.9 %
10 SYRINGE (ML) INJECTION EVERY 12 HOURS PRN
Status: DISCONTINUED | OUTPATIENT
Start: 2023-08-19 | End: 2023-08-25 | Stop reason: HOSPADM

## 2023-08-19 RX ORDER — SODIUM CHLORIDE 9 MG/ML
1000 INJECTION, SOLUTION INTRAVENOUS
Status: COMPLETED | OUTPATIENT
Start: 2023-08-19 | End: 2023-08-19

## 2023-08-19 RX ORDER — ACETAMINOPHEN 325 MG/1
650 TABLET ORAL EVERY 4 HOURS PRN
Status: DISCONTINUED | OUTPATIENT
Start: 2023-08-19 | End: 2023-08-25 | Stop reason: HOSPADM

## 2023-08-19 RX ORDER — METOPROLOL TARTRATE 25 MG/1
25 TABLET, FILM COATED ORAL 2 TIMES DAILY
Status: DISCONTINUED | OUTPATIENT
Start: 2023-08-19 | End: 2023-08-22

## 2023-08-19 RX ORDER — CLONAZEPAM 0.5 MG/1
1 TABLET ORAL NIGHTLY
Status: DISCONTINUED | OUTPATIENT
Start: 2023-08-19 | End: 2023-08-20

## 2023-08-19 RX ORDER — TALC
6 POWDER (GRAM) TOPICAL NIGHTLY PRN
Status: DISCONTINUED | OUTPATIENT
Start: 2023-08-19 | End: 2023-08-25 | Stop reason: HOSPADM

## 2023-08-19 RX ORDER — ATORVASTATIN CALCIUM 20 MG/1
20 TABLET, FILM COATED ORAL DAILY
Status: DISCONTINUED | OUTPATIENT
Start: 2023-08-20 | End: 2023-08-20

## 2023-08-19 RX ORDER — ASPIRIN 325 MG
325 TABLET ORAL
Status: COMPLETED | OUTPATIENT
Start: 2023-08-19 | End: 2023-08-19

## 2023-08-19 RX ORDER — METOPROLOL TARTRATE 25 MG/1
25 TABLET, FILM COATED ORAL
Status: COMPLETED | OUTPATIENT
Start: 2023-08-19 | End: 2023-08-19

## 2023-08-19 RX ORDER — FUROSEMIDE 10 MG/ML
20 INJECTION INTRAMUSCULAR; INTRAVENOUS
Status: COMPLETED | OUTPATIENT
Start: 2023-08-19 | End: 2023-08-19

## 2023-08-19 RX ORDER — LOSARTAN POTASSIUM 25 MG/1
25 TABLET ORAL DAILY
Status: DISCONTINUED | OUTPATIENT
Start: 2023-08-20 | End: 2023-08-25 | Stop reason: HOSPADM

## 2023-08-19 RX ORDER — METOPROLOL TARTRATE 1 MG/ML
2.5 INJECTION, SOLUTION INTRAVENOUS EVERY 12 HOURS
Status: DISCONTINUED | OUTPATIENT
Start: 2023-08-19 | End: 2023-08-19 | Stop reason: HOSPADM

## 2023-08-19 RX ORDER — ATORVASTATIN CALCIUM 40 MG/1
80 TABLET, FILM COATED ORAL NIGHTLY
Status: DISCONTINUED | OUTPATIENT
Start: 2023-08-19 | End: 2023-08-19 | Stop reason: HOSPADM

## 2023-08-19 RX ORDER — ENOXAPARIN SODIUM 100 MG/ML
40 INJECTION SUBCUTANEOUS EVERY 24 HOURS
Status: DISCONTINUED | OUTPATIENT
Start: 2023-08-20 | End: 2023-08-25 | Stop reason: HOSPADM

## 2023-08-19 RX ORDER — LOSARTAN POTASSIUM 25 MG/1
25 TABLET ORAL DAILY
Status: DISCONTINUED | OUTPATIENT
Start: 2023-08-19 | End: 2023-08-19

## 2023-08-19 RX ORDER — SIMETHICONE 80 MG
1 TABLET,CHEWABLE ORAL 4 TIMES DAILY PRN
Status: DISCONTINUED | OUTPATIENT
Start: 2023-08-19 | End: 2023-08-25 | Stop reason: HOSPADM

## 2023-08-19 RX ADMIN — METOPROLOL TARTRATE 2.5 MG: 5 INJECTION INTRAVENOUS at 05:08

## 2023-08-19 RX ADMIN — FUROSEMIDE 20 MG: 10 INJECTION, SOLUTION INTRAMUSCULAR; INTRAVENOUS at 11:08

## 2023-08-19 RX ADMIN — ATORVASTATIN CALCIUM 80 MG: 40 TABLET, FILM COATED ORAL at 05:08

## 2023-08-19 RX ADMIN — SODIUM CHLORIDE 1000 ML: 9 INJECTION, SOLUTION INTRAVENOUS at 04:08

## 2023-08-19 RX ADMIN — SODIUM CHLORIDE 1000 ML: 9 INJECTION, SOLUTION INTRAVENOUS at 10:08

## 2023-08-19 RX ADMIN — CLONAZEPAM 1 MG: 0.5 TABLET ORAL at 11:08

## 2023-08-19 RX ADMIN — ASPIRIN 325 MG ORAL TABLET 325 MG: 325 PILL ORAL at 01:08

## 2023-08-19 RX ADMIN — METOPROLOL TARTRATE 25 MG: 25 TABLET, FILM COATED ORAL at 09:08

## 2023-08-19 RX ADMIN — CEFTRIAXONE SODIUM 1 G: 1 INJECTION, POWDER, FOR SOLUTION INTRAMUSCULAR; INTRAVENOUS at 10:08

## 2023-08-19 RX ADMIN — CLONAZEPAM 1 MG: 0.5 TABLET ORAL at 09:08

## 2023-08-19 RX ADMIN — METOPROLOL TARTRATE 25 MG: 25 TABLET, FILM COATED ORAL at 11:08

## 2023-08-19 NOTE — Clinical Note
The catheter was inserted into the and was inserted over the wire into the ostium   left main. An angiography was performed of the left coronary arteries.

## 2023-08-19 NOTE — ED NOTES
Report called to monique alcala at [facility]. Pt to transfer by ems and admit to room 566.

## 2023-08-19 NOTE — ED NOTES
Assisted pt up to bsc to urinate. Pt urinated 600cc dark vannessa urine. Noted pt hr elevated to 150 while getting up back down to 120 when assisted back to bed. Will notify dr trejo. Pt denies chest or sob.

## 2023-08-19 NOTE — ED TRIAGE NOTES
Pt brought in by her brother who states that patient has weakness that started today. Pt denies pain, shortness of breath, N/V, dizziness. Pt has tremors, but this not a new finding.

## 2023-08-19 NOTE — ED NOTES
[facility] called with rm assignment-will transfer to [facility] [address]-notified [facility] of transfer-awaitining available ambulance

## 2023-08-19 NOTE — Clinical Note
1200 Report given to Ana Olvera RN at bedside. Dressing to right groin clean dry intact. DP doppler.

## 2023-08-19 NOTE — ED PROVIDER NOTES
"Encounter Date: 8/18/2023       History     Chief Complaint   Patient presents with    Extremity Weakness     Brother brought this patient to the hospital due to " extreme weakness"    The patient has no idea where she is, and she is blind due to bilateral cataracts.  She cannot give a cogent history, and besides knowing that she is weak, she does not know why she is here.      The history is provided by the patient. The history is limited by the absence of a caregiver. No  was used.     Review of patient's allergies indicates:  No Known Allergies  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      Past Surgical History:   Procedure Laterality Date    EYE SURGERY      HYSTERECTOMY       History reviewed. No pertinent family history.  Social History     Tobacco Use    Smoking status: Never    Smokeless tobacco: Never   Substance Use Topics    Alcohol use: Never     Review of Systems   Eyes:  Positive for visual disturbance.   Respiratory: Negative.     Cardiovascular:  Positive for leg swelling.   Gastrointestinal: Negative.    Musculoskeletal:  Positive for gait problem.   All other systems reviewed and are negative.      Physical Exam     Initial Vitals [08/18/23 1916]   BP Pulse Resp Temp SpO2   121/85 (!) 125 (!) 22 98.4 °F (36.9 °C) 97 %      MAP       --         Physical Exam    Constitutional: She appears well-developed and well-nourished.   HENT:   Head: Normocephalic and atraumatic.   Eyes: Right eye exhibits no discharge. Left eye exhibits no discharge. No scleral icterus.   Neck: No thyromegaly present. No JVD present.   Normal range of motion.  Cardiovascular:  Regular rhythm.           Pulmonary/Chest: Breath sounds normal.   Abdominal: Abdomen is soft. Bowel sounds are normal.   Musculoskeletal:         General: Edema present. Normal " range of motion.      Cervical back: Normal range of motion.     Lymphadenopathy:     She has no cervical adenopathy.   Neurological: She is alert.   Skin: Skin is warm and dry.         Medical Screening Exam   See Full Note    ED Course   Procedures  Labs Reviewed   COMPREHENSIVE METABOLIC PANEL - Abnormal; Notable for the following components:       Result Value    CO2 20 (*)     Anion Gap 21 (*)     Glucose 163 (*)     BUN 27 (*)     Creatinine 1.18 (*)     BUN/Creatinine Ratio 23 (*)     Albumin 3.2 (*)     ALT 60 (*)     AST 44 (*)     eGFR 55 (*)     All other components within normal limits   URINALYSIS - Abnormal; Notable for the following components:    Protein, UA 30 (*)     Ketones, UA 15 (*)     All other components within normal limits   CBC WITH DIFFERENTIAL - Abnormal; Notable for the following components:    WBC 14.25 (*)     MCV 76.6 (*)     MCH 24.4 (*)     MCHC 31.9 (*)     Neutrophils % 86.4 (*)     Lymphocytes % 6.2 (*)     Neutrophils, Abs 12.32 (*)     Lymphocytes, Absolute 0.88 (*)     Monocytes % 7.1 (*)     Eosinophils % 0.0 (*)     Monocytes, Absolute 1.01 (*)     All other components within normal limits   URINALYSIS, MICROSCOPIC - Normal   CBC W/ AUTO DIFFERENTIAL    Narrative:     The following orders were created for panel order CBC auto differential.  Procedure                               Abnormality         Status                     ---------                               -----------         ------                     CBC with Differential[[id number]]        Abnormal            Final result                 Please view results for these tests on the individual orders.          Imaging Results    None          Medications   0.9%  NaCl infusion (0 mLs Intravenous Stopped 8/18/23 2208)     Medical Decision Making  Amount and/or Complexity of Data Reviewed  Labs: ordered.  Radiology: ordered and independent interpretation performed.    Risk  OTC drugs.  Prescription drug  management.               ED Course as of 09/29/23 0641   Sat Aug 19, 2023   1008 NT-proBNP(!): 615 [EN]   1008 WBC(!): 14.25 [EN]   1008 Neutrophils Relative(!): 86.4 [EN]   1008 Glucose(!): 163 [EN]   1008 Creatinine(!): 1.18 [EN]   1517 Troponin I High Sensitivity(!!): 926.7 [EN]   1704 Troponin I(!!) [EN]   1704 Troponin I High Sensitivity(!!): 698.9  decreased [EN]   1704 C-reactive protein(!) [EN]   1704 CRP(!): 8.84 [EN]   1704 CBC auto differential(!) [EN]   1705 WBC: 7.22  dereased [EN]      ED Course User Index  [EN] Melba Buckley MD                    Clinical Impression:     Shortness of breath  Elevated troponin            Jeysno Barros MD  11/21/23 1969

## 2023-08-19 NOTE — ED NOTES
Pt resting in bed with eyes closed and resp even and unlabored. Will continue to monitor. Awaiting bed assignment at [facility].

## 2023-08-20 LAB
ALBUMIN SERPL BCP-MCNC: 2.5 G/DL (ref 3.5–5)
ALBUMIN/GLOB SERPL: 0.8 {RATIO}
ALP SERPL-CCNC: 70 U/L (ref 46–118)
ALT SERPL W P-5'-P-CCNC: 53 U/L (ref 13–56)
ANION GAP SERPL CALCULATED.3IONS-SCNC: 8 MMOL/L (ref 7–16)
AORTIC ROOT ANNULUS: 3.52 CM
AORTIC VALVE CUSP SEPERATION: 2.25 CM
AST SERPL W P-5'-P-CCNC: 40 U/L (ref 15–37)
AV INDEX (PROSTH): 0.84
AV MEAN GRADIENT: 4 MMHG
AV PEAK GRADIENT: 8 MMHG
AV VALVE AREA BY VELOCITY RATIO: 3.07 CM²
AV VALVE AREA: 2.95 CM²
AV VELOCITY RATIO: 0.87
BASOPHILS # BLD AUTO: 0.01 K/UL (ref 0–0.2)
BASOPHILS NFR BLD AUTO: 0.2 % (ref 0–1)
BILIRUB SERPL-MCNC: 2 MG/DL (ref ?–1.2)
BSA FOR ECHO PROCEDURE: 1.96 M2
BUN SERPL-MCNC: 14 MG/DL (ref 7–18)
BUN/CREAT SERPL: 33 (ref 6–20)
CALCIUM SERPL-MCNC: 9 MG/DL (ref 8.5–10.1)
CHLORIDE SERPL-SCNC: 114 MMOL/L (ref 98–107)
CO2 SERPL-SCNC: 25 MMOL/L (ref 21–32)
CREAT SERPL-MCNC: 0.43 MG/DL (ref 0.55–1.02)
CV ECHO LV RWT: 0.38 CM
DIFFERENTIAL METHOD BLD: ABNORMAL
DOP CALC AO PEAK VEL: 1.4 M/S
DOP CALC AO VTI: 22.7 CM
DOP CALC LVOT AREA: 3.5 CM2
DOP CALC LVOT DIAMETER: 2.12 CM
DOP CALC LVOT PEAK VEL: 1.22 M/S
DOP CALC LVOT STROKE VOLUME: 67.03 CM3
DOP CALCLVOT PEAK VEL VTI: 19 CM
ECHO LV POSTERIOR WALL: 0.91 CM (ref 0.6–1.1)
EGFR (NO RACE VARIABLE) (RUSH/TITUS): 115 ML/MIN/1.73M2
EOSINOPHIL # BLD AUTO: 0.1 K/UL (ref 0–0.5)
EOSINOPHIL NFR BLD AUTO: 1.6 % (ref 1–4)
ERYTHROCYTE [DISTWIDTH] IN BLOOD BY AUTOMATED COUNT: 13.1 % (ref 11.5–14.5)
FRACTIONAL SHORTENING: 25 % (ref 28–44)
GLOBULIN SER-MCNC: 3.1 G/DL (ref 2–4)
GLUCOSE SERPL-MCNC: 105 MG/DL (ref 74–106)
HCT VFR BLD AUTO: 30.5 % (ref 38–47)
HGB BLD-MCNC: 9.7 G/DL (ref 12–16)
IMM GRANULOCYTES # BLD AUTO: 0.02 K/UL (ref 0–0.04)
IMM GRANULOCYTES NFR BLD: 0.3 % (ref 0–0.4)
INTERVENTRICULAR SEPTUM: 0.96 CM (ref 0.6–1.1)
IVC DIAMETER: 1.32 CM
LEFT ATRIUM SIZE: 2.6 CM
LEFT INTERNAL DIMENSION IN SYSTOLE: 3.56 CM (ref 2.1–4)
LEFT VENTRICLE DIASTOLIC VOLUME INDEX: 52.96 ML/M2
LEFT VENTRICLE DIASTOLIC VOLUME: 103.81 ML
LEFT VENTRICLE MASS INDEX: 77 G/M2
LEFT VENTRICLE SYSTOLIC VOLUME INDEX: 27.1 ML/M2
LEFT VENTRICLE SYSTOLIC VOLUME: 53.04 ML
LEFT VENTRICULAR INTERNAL DIMENSION IN DIASTOLE: 4.73 CM (ref 3.5–6)
LEFT VENTRICULAR MASS: 151.75 G
LVOT MG: 3.29 MMHG
LVOT MV: 0.87 CM/S
LYMPHOCYTES # BLD AUTO: 1.46 K/UL (ref 1–4.8)
LYMPHOCYTES NFR BLD AUTO: 23.2 % (ref 27–41)
MAGNESIUM SERPL-MCNC: 2.1 MG/DL (ref 1.7–2.3)
MCH RBC QN AUTO: 24.4 PG (ref 27–31)
MCHC RBC AUTO-ENTMCNC: 31.8 G/DL (ref 32–36)
MCV RBC AUTO: 76.6 FL (ref 80–96)
MONOCYTES # BLD AUTO: 0.66 K/UL (ref 0–0.8)
MONOCYTES NFR BLD AUTO: 10.5 % (ref 2–6)
MPC BLD CALC-MCNC: 11.8 FL (ref 9.4–12.4)
NEUTROPHILS # BLD AUTO: 4.03 K/UL (ref 1.8–7.7)
NEUTROPHILS NFR BLD AUTO: 64.2 % (ref 53–65)
NRBC # BLD AUTO: 0 X10E3/UL
NRBC, AUTO (.00): 0 %
OHS LV EJECTION FRACTION SIMPSONS BIPLANE MOD: 55 %
PLATELET # BLD AUTO: 252 K/UL (ref 150–400)
POTASSIUM SERPL-SCNC: 3 MMOL/L (ref 3.5–5.1)
PROT SERPL-MCNC: 5.6 G/DL (ref 6.4–8.2)
RBC # BLD AUTO: 3.98 M/UL (ref 4.2–5.4)
RIGHT ATRIAL AREA: 12 CM2
RIGHT VENTRICULAR END-DIASTOLIC DIMENSION: 2.7 CM
SODIUM SERPL-SCNC: 144 MMOL/L (ref 136–145)
TRICUSPID ANNULAR PLANE SYSTOLIC EXCURSION: 1.99 CM
TROPONIN I SERPL DL<=0.01 NG/ML-MCNC: 442.7 PG/ML
WBC # BLD AUTO: 6.28 K/UL (ref 4.5–11)
Z-SCORE OF LEFT VENTRICULAR DIMENSION IN END DIASTOLE: -1.71
Z-SCORE OF LEFT VENTRICULAR DIMENSION IN END SYSTOLE: 0.25

## 2023-08-20 PROCEDURE — 99232 PR SUBSEQUENT HOSPITAL CARE,LEVL II: ICD-10-PCS | Mod: GC,,, | Performed by: FAMILY MEDICINE

## 2023-08-20 PROCEDURE — 93005 ELECTROCARDIOGRAM TRACING: CPT

## 2023-08-20 PROCEDURE — 85025 COMPLETE CBC W/AUTO DIFF WBC: CPT | Performed by: GENERAL PRACTICE

## 2023-08-20 PROCEDURE — 84484 ASSAY OF TROPONIN QUANT: CPT

## 2023-08-20 PROCEDURE — 99223 PR INITIAL HOSPITAL CARE,LEVL III: ICD-10-PCS | Mod: ,,, | Performed by: STUDENT IN AN ORGANIZED HEALTH CARE EDUCATION/TRAINING PROGRAM

## 2023-08-20 PROCEDURE — 99223 PR INITIAL HOSPITAL CARE,LEVL III: ICD-10-PCS | Mod: AI,,, | Performed by: INTERNAL MEDICINE

## 2023-08-20 PROCEDURE — 93010 ELECTROCARDIOGRAM REPORT: CPT | Mod: ,,, | Performed by: INTERNAL MEDICINE

## 2023-08-20 PROCEDURE — 99232 SBSQ HOSP IP/OBS MODERATE 35: CPT | Mod: GC,,, | Performed by: FAMILY MEDICINE

## 2023-08-20 PROCEDURE — 93010 EKG 12-LEAD: ICD-10-PCS | Mod: ,,, | Performed by: INTERNAL MEDICINE

## 2023-08-20 PROCEDURE — 25000003 PHARM REV CODE 250

## 2023-08-20 PROCEDURE — 83735 ASSAY OF MAGNESIUM: CPT

## 2023-08-20 PROCEDURE — 63600175 PHARM REV CODE 636 W HCPCS: Performed by: GENERAL PRACTICE

## 2023-08-20 PROCEDURE — 80053 COMPREHEN METABOLIC PANEL: CPT | Performed by: GENERAL PRACTICE

## 2023-08-20 PROCEDURE — 25000003 PHARM REV CODE 250: Performed by: INTERNAL MEDICINE

## 2023-08-20 PROCEDURE — 11000001 HC ACUTE MED/SURG PRIVATE ROOM

## 2023-08-20 PROCEDURE — 99223 1ST HOSP IP/OBS HIGH 75: CPT | Mod: AI,,, | Performed by: INTERNAL MEDICINE

## 2023-08-20 PROCEDURE — 25000003 PHARM REV CODE 250: Performed by: GENERAL PRACTICE

## 2023-08-20 PROCEDURE — 99223 1ST HOSP IP/OBS HIGH 75: CPT | Mod: ,,, | Performed by: STUDENT IN AN ORGANIZED HEALTH CARE EDUCATION/TRAINING PROGRAM

## 2023-08-20 RX ORDER — SODIUM CHLORIDE 0.9 % (FLUSH) 0.9 %
10 SYRINGE (ML) INJECTION
Status: DISCONTINUED | OUTPATIENT
Start: 2023-08-20 | End: 2023-08-25 | Stop reason: HOSPADM

## 2023-08-20 RX ORDER — POTASSIUM CHLORIDE 20 MEQ/1
40 TABLET, EXTENDED RELEASE ORAL ONCE
Status: COMPLETED | OUTPATIENT
Start: 2023-08-20 | End: 2023-08-20

## 2023-08-20 RX ORDER — ASPIRIN 81 MG/1
81 TABLET ORAL DAILY
Status: DISCONTINUED | OUTPATIENT
Start: 2023-08-20 | End: 2023-08-22

## 2023-08-20 RX ORDER — CLONAZEPAM 0.5 MG/1
1 TABLET ORAL EVERY MORNING
Status: DISCONTINUED | OUTPATIENT
Start: 2023-08-20 | End: 2023-08-25 | Stop reason: HOSPADM

## 2023-08-20 RX ORDER — ATORVASTATIN CALCIUM 80 MG/1
80 TABLET, FILM COATED ORAL DAILY
Status: DISCONTINUED | OUTPATIENT
Start: 2023-08-20 | End: 2023-08-22

## 2023-08-20 RX ADMIN — PAROXETINE 20 MG: 20 TABLET, FILM COATED ORAL at 08:08

## 2023-08-20 RX ADMIN — ATORVASTATIN CALCIUM 80 MG: 80 TABLET, FILM COATED ORAL at 08:08

## 2023-08-20 RX ADMIN — METOPROLOL TARTRATE 25 MG: 25 TABLET, FILM COATED ORAL at 08:08

## 2023-08-20 RX ADMIN — CLONAZEPAM 1 MG: 0.5 TABLET ORAL at 12:08

## 2023-08-20 RX ADMIN — ASPIRIN 81 MG: 81 TABLET, COATED ORAL at 08:08

## 2023-08-20 RX ADMIN — POTASSIUM CHLORIDE 40 MEQ: 1500 TABLET, EXTENDED RELEASE ORAL at 10:08

## 2023-08-20 RX ADMIN — ENOXAPARIN SODIUM 40 MG: 100 INJECTION SUBCUTANEOUS at 05:08

## 2023-08-20 RX ADMIN — LOSARTAN POTASSIUM 25 MG: 25 TABLET, FILM COATED ORAL at 08:08

## 2023-08-20 RX ADMIN — METOPROLOL TARTRATE 25 MG: 25 TABLET, FILM COATED ORAL at 10:08

## 2023-08-20 NOTE — ASSESSMENT & PLAN NOTE
08/20  - Repeated trops; pending results         Mental disability. Patient denies active chest pain  hemodynamically stable  EKG sinus tachycardia   Elevated troponin 686, 926, 698, 543  Consult cardiology in the morning  Monitor vital signs an physical exam

## 2023-08-20 NOTE — ASSESSMENT & PLAN NOTE
Per family patient has been disable since birth. Family unclear on the cause.  Per family at baseline patient can feed herself but needs assistance with feeding most of the time. She walks to the bathroom and back to her bedroom on her own and stays most of the time lying in bed. She has a caretaker who is planning on coming to stay with her in the hospital tomorrow.

## 2023-08-20 NOTE — SUBJECTIVE & OBJECTIVE
Interval History: Patient seen and examined at bedside. Reports feeling well. Denies angina, dyspnea, HA, or abdominal discomfort. Will continue to follow. Cardiology consulted. CT head showed no acute events. Repeat ECG pending; ECHO pending. Trop pending; K 3.0 given 40 mEq will recheck levels in the morning. Bilirubin 2.0; AST 40; Albumin 2.5. Remain NPO until notice from cardiology. Purewick placed.     Review of Systems   Constitutional:  Negative for appetite change and fatigue.   Respiratory:  Negative for chest tightness and shortness of breath.    Cardiovascular:  Negative for chest pain.   Gastrointestinal:  Negative for abdominal distention and abdominal pain.   Neurological:  Negative for light-headedness and headaches.     Objective:     Vital Signs (Most Recent):  Temp: 98.6 °F (37 °C) (08/20/23 1100)  Pulse: 108 (08/20/23 1100)  Resp: 18 (08/20/23 1100)  BP: (!) 148/80 (08/20/23 1100)  SpO2: 96 % (08/20/23 1100) Vital Signs (24h Range):  Temp:  [98.6 °F (37 °C)-99.8 °F (37.7 °C)] 98.6 °F (37 °C)  Pulse:  [] 108  Resp:  [18-30] 18  SpO2:  [96 %-99 %] 96 %  BP: ()/(61-88) 148/80     Weight: 78 kg (172 lb)  Body mass index is 24.68 kg/m².  No intake or output data in the 24 hours ending 08/20/23 1347      Physical Exam  Vitals and nursing note reviewed.   Constitutional:       General: She is not in acute distress.     Appearance: Normal appearance. She is not ill-appearing.   HENT:      Head: Normocephalic and atraumatic.   Eyes:      Comments: Blind   Cardiovascular:      Rate and Rhythm: Normal rate.      Heart sounds: No murmur heard.     No friction rub. No gallop.   Pulmonary:      Effort: No respiratory distress.      Breath sounds: No wheezing, rhonchi or rales.   Abdominal:      General: There is no distension.      Tenderness: There is no abdominal tenderness.   Musculoskeletal:      Right lower leg: No edema.      Left lower leg: No edema.      Comments: Left-side hand tremors     Skin:     Findings: No lesion or rash.   Neurological:      Mental Status: She is alert.             Significant Labs: All pertinent labs within the past 24 hours have been reviewed.    Significant Imaging: I have reviewed all pertinent imaging results/findings within the past 24 hours.

## 2023-08-20 NOTE — CONSULTS
[facility] Tele[facility]  Cardiology  Consult Note    Patient Name: Kenia Carias  MRN: [id number]  Admission Date: 8/19/2023  Hospital Length of Stay: 1 days  Code Status: Full Code   Attending Provider: Apolonia Hubbard DO   Consulting Provider: LLOYD RITCHIE MD  Primary Care Physician: Elaine Clark MD  Principal Problem:Elevated troponin    Patient information was obtained from relative(s) and primary team.     Inpatient consult to Cardiology  Consult performed by: Lloyd Ritchie MD  Consult ordered by: Armand Velasquez MD        Subjective:     Chief Complaint:  Troponin elevation     HPI:   56 y/o female with PMHx of intellectual disability, HTN and blindness who presents to [facility] transferred from [facility] due to elevated troponin. Patient is not able to provide a history. History from her caretaker who is present in the room and chart review. Per family, she was brought to the hospital because she has been extremely weak and had a fall yesterday at home that was not witnessed, she was found on the floor alert. He states that at baseline the patient does not voluntarily hold a conversation since she cannot elaborate phrases. Patient can feed herself but needs assistance most of the times. She walks to the bathroom and back to her bedroom on her own and stays most of the time lying in bed. Patient became disabled due to problems during birth. Patient's brother denies history of MI or stroke. Patient has a caretaker that helps with daily chores and family lives next door.     In the ED vital signs showed /88, , RR 20, SpO2 98% at room air, afebrile. Blood work showed H/H 10.3/31.8, WBC 7.22, , sodium 145 potassium 3.1, BUN/Cr 16/0.75, glucose 168, CRP 8.84, lipid panel no hypercholesterolemia, p-, elevated troponin 686, 926, 698, 543, EKG nsr with HR 93. CT head showing no acute intracranial abnormalities. Chest xray showing no infiltrates or  consolidation.       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        Past Surgical History:   Procedure Laterality Date    EYE SURGERY      HYSTERECTOMY         Review of patient's allergies indicates:  No Known Allergies    Current Facility-Administered Medications on File Prior to Encounter   Medication    [COMPLETED] 0.9%  NaCl infusion    [DISCONTINUED] atorvastatin tablet 80 mg    [DISCONTINUED] metoprolol injection 2.5 mg     Current Outpatient Medications on File Prior to Encounter   Medication Sig    clonazePAM (KLONOPIN) 1 MG tablet Take 1 mg by mouth nightly as needed.    furosemide (LASIX) 20 MG tablet Take 1 tablet by mouth twice a week. Takes Monday and Thursday as needed for swelling in legs    losartan (COZAAR) 25 MG tablet Take 25 mg by mouth once daily.    metoprolol tartrate (LOPRESSOR) 25 MG tablet Take 25 mg by mouth 2 (two) times daily.    paroxetine (PAXIL) 20 MG tablet Take 1 tablet by mouth once daily.    potassium citrate 99 mg Cap Take 1 tablet by mouth twice a week.    rosuvastatin (CRESTOR) 5 MG tablet Take 5 mg by mouth once daily.     Family History    None       Tobacco Use    Smoking status: Never    Smokeless tobacco: Never   Substance and Sexual Activity    Alcohol use: Never    Drug use: Not on file    Sexual activity: Never     Review of Systems   Reason unable to perform ROS: Due to intellectual disability at baseline.     Objective:     Vital Signs (Most Recent):  Temp: 98 °F (36.7 °C) (08/20/23 1608)  Pulse: 107 (08/20/23 1608)  Resp: 19 (08/20/23 1608)  BP: (!) 140/79 (08/20/23 1608)  SpO2: 95 % (08/20/23 1608) Vital Signs (24h Range):  Temp:  [98 °F (36.7 °C)-99.8 °F (37.7 °C)] 98 °F (36.7 °C)  Pulse:  [] 107  Resp:  [18-27] 19  SpO2:  [95 %-99 %] 95 %  BP: (103-148)/(61-88) 140/79     Weight: 78 kg  (172 lb)  Body mass index is 24.68 kg/m².    SpO2: 95 %       No intake or output data in the 24 hours ending 08/20/23 1619    Lines/Drains/Airways       Peripheral Intravenous Line  Duration                  Peripheral IV - Single Lumen 08/19/23 2123 20 G Left;Posterior Hand <1 day                     Physical Exam  Constitutional:       General: She is not in acute distress.     Appearance: Normal appearance.   HENT:      Head: Normocephalic and atraumatic.   Neck:      Comments: No JVD  Cardiovascular:      Rate and Rhythm: Normal rate.      Heart sounds: No murmur heard.     No friction rub. No gallop.   Pulmonary:      Breath sounds: No wheezing, rhonchi or rales.   Musculoskeletal:      Right lower leg: No edema.      Left lower leg: No edema.   Skin:     General: Skin is warm and dry.   Neurological:      Mental Status: She is alert.          Significant Labs: All pertinent lab results from the last 24 hours have been reviewed.    Significant Imaging: Echocardiogram: Transthoracic echo (TTE) complete (Cupid Only):   Results for orders placed or performed during the hospital encounter of 08/19/23   Echo   Result Value Ref Range    BSA 1.96 m2    LVOT stroke volume 67.03 cm3    LVIDd 4.73 3.5 - 6.0 cm    LV Systolic Volume 53.04 mL    LV Systolic Volume Index 27.1 mL/m2    LVIDs 3.56 2.1 - 4.0 cm    LV Diastolic Volume 103.81 mL    LV Diastolic Volume Index 52.96 mL/m2    IVS 0.96 0.6 - 1.1 cm    LVOT diameter 2.12 cm    LVOT area 3.5 cm2    FS 25 (A) 28 - 44 %    Left Ventricle Relative Wall Thickness 0.38 cm    Posterior Wall 0.91 0.6 - 1.1 cm    LV mass 151.75 g    LV Mass Index 77 g/m2    LVOT peak vivien 1.22 m/s    Left Ventricular Outflow Tract Mean Velocity 0.87 cm/s    Left Ventricular Outflow Tract Mean Gradient 3.29 mmHg    TAPSE 1.99 cm    RA area 12.0 cm2    AV mean gradient 4 mmHg    AV peak gradient 8 mmHg    Ao peak vivien 1.40 m/s    Ao VTI 22.70 cm    LVOT peak VTI 19.00 cm    AV valve area 2.95 cm²     AV Velocity Ratio 0.87     AV index (prosthetic) 0.84     YAMILET by Velocity Ratio 3.07 cm²    Ao root annulus 3.52 cm    IVC diameter 1.32 cm    ZLVIDS 0.25     ZLVIDD -1.71     AORTIC VALVE CUSP SEPERATION 2.25 cm    LA size 2.6 cm    RVDD 2.70 cm    Bains's Biplane MOD Ejection Fraction 55 %    Narrative      Left Ventricle: The left ventricle is normal in size. Ventricular mass   is normal. Normal wall thickness. Normal wall motion. There is normal   systolic function. Biplane (2D) method of discs ejection fraction is 55%.    Right Ventricle: Normal right ventricular cavity size. Systolic   function is normal.    Pulmonary Artery: Pulmonary artery pressure could not be accurately   determined.       Assessment and Plan:     * Elevated troponin  Elevated troponin level and EKG changes suggestive of anterior ischemia  Hx unreliable due to baseline intellectual disability     - Recommend coronary angiography for evaluation of coronary anatomy  - Risks versus benefits versus alternatives management options discussed with the patient's caregiver.  She notes that she makes healthcare decisions for the patient.  She also note that she gives her medications and there would be no concern for non-compliance  - NPO after midnight for [facility] tomorrow         VTE Risk Mitigation (From admission, onward)         Ordered     enoxaparin injection 40 mg  Every 24 hours         08/19/23 2101     IP VTE LOW RISK PATIENT  Once         08/19/23 2101     Place sequential compression device  Until discontinued         08/19/23 2101                Thank you for your consult. I will follow-up with patient. Please contact us if you have any additional questions.    HEIKE LAST MD  Cardiology   [facility] - [address] [place] [address]

## 2023-08-20 NOTE — ASSESSMENT & PLAN NOTE
Elevated troponin level and EKG changes suggestive of anterior ischemia  Hx unreliable due to baseline intellectual disability     - Recommend coronary angiography for evaluation of coronary anatomy  - Risks versus benefits versus alternatives management options discussed with the patient's caregiver.  She notes that she makes healthcare decisions for the patient.  She also note that she gives her medications and there would be no concern for non-compliance  - NPO after midnight for [facility] tomorrow

## 2023-08-20 NOTE — H&P
[facility] Hospital Medicine  History & Physical    Patient Name: Kenia Carias  MRN: [id number]  Patient Class: IP- Inpatient  Admission Date: 8/19/2023  Attending Physician: Denver Rosado MD   Primary Care Provider: Elaine Clark MD         Patient information was obtained from relative(s) and past medical records.     Subjective:     Principal Problem:Elevated troponin    Chief Complaint:   Chief Complaint   Patient presents with    Extremity Weakness        HPI: Patient is a 56 y/o female with PMHx of HTN, mental disability and blindness who presents to [facility] transferred from [facility] due to elevated troponin. Patient is not able to provide a history. History was taken from her brother present in the room. Patient's brother states that he took the patient to the hospital because she has been extreme weak and had a fall yesterday at home that was not witnessed, she was found on the floor alert. Patient's brother cannot precise how long patient was on the floor. He states that at baseline the patient does not voluntarily hold a conversation since she cannot elaborate phrases. Patient can feed herself but needs assistance most of the times. She walks to the bathroom and back to her bedroom on her own and stays most of the time lying in bed. Patient became disabled due to problems during birth. Patient's brother denies history of MI or stroke. Patient has a caretaker that helps with daily chores and family lives next door.    In the ED vital signs showed /88, , RR 20, SpO2 98% at room air, afebrile. Blood work showed H/H 10.3/31.8, WBC 7.22, , sodium 145 potassium 3.1, BUN/Cr 16/0.75, glucose 168, CRP 8.84, lipid panel no hypercholesterolemia, p-, elevated troponin 686, 926, 698, 543, EKG nsr with HR 93. CT head showing no acute intracranial abnormalities. Chest xray showing no infiltrates or consolidation. Patient will be admitted to  the hospital for further management.      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        Past Surgical History:   Procedure Laterality Date    EYE SURGERY      HYSTERECTOMY         Review of patient's allergies indicates:  No Known Allergies    Current Facility-Administered Medications on File Prior to Encounter   Medication    [COMPLETED] 0.9%  NaCl infusion    [COMPLETED] 0.9%  NaCl infusion    [COMPLETED] aspirin tablet 325 mg    [COMPLETED] cefTRIAXone (ROCEPHIN) 1 g in dextrose 5 % in water (D5W) 100 mL IVPB (MB+)    [COMPLETED] clonazePAM tablet 1 mg    [COMPLETED] furosemide injection 20 mg    [COMPLETED] metoprolol tartrate (LOPRESSOR) tablet 25 mg    [DISCONTINUED] atorvastatin tablet 80 mg    [DISCONTINUED] losartan tablet 25 mg    [DISCONTINUED] metoprolol injection 2.5 mg    [DISCONTINUED] metoprolol injection 2.5 mg     Current Outpatient Medications on File Prior to Encounter   Medication Sig    clonazePAM (KLONOPIN) 1 MG tablet Take 1 mg by mouth nightly as needed.    furosemide (LASIX) 20 MG tablet Take 1 tablet by mouth twice a week. Takes Monday and Thursday as needed for swelling in legs    losartan (COZAAR) 25 MG tablet Take 25 mg by mouth once daily.    metoprolol tartrate (LOPRESSOR) 25 MG tablet Take 25 mg by mouth 2 (two) times daily.    paroxetine (PAXIL) 20 MG tablet Take 1 tablet by mouth once daily.    potassium citrate 99 mg Cap Take 1 tablet by mouth twice a week.    rosuvastatin (CRESTOR) 5 MG tablet Take 5 mg by mouth once daily.     Family History    None       Tobacco Use    Smoking status: Never    Smokeless tobacco: Never   Substance and Sexual Activity    Alcohol use: Never    Drug use: Not on file    Sexual activity: Never     Review of Systems   Unable to perform ROS: Other     Objective:     Vital  Signs (Most Recent):  Temp: 98.9 °F (37.2 °C) (08/19/23 1930)  Pulse: 108 (08/19/23 1930)  Resp: 20 (08/19/23 1930)  BP: 115/88 (08/19/23 1930)  SpO2: 98 % (08/19/23 1930) Vital Signs (24h Range):  Temp:  [98.9 °F (37.2 °C)-99.2 °F (37.3 °C)] 98.9 °F (37.2 °C)  Pulse:  [] 108  Resp:  [17-32] 20  SpO2:  [94 %-100 %] 98 %  BP: ()/(56-88) 115/88     Weight: 78.2 kg (172 lb 6.4 oz)  Body mass index is 24.74 kg/m².     Physical Exam  Vitals and nursing note reviewed.   Constitutional:       General: She is not in acute distress.     Appearance: She is not toxic-appearing.      Comments: Frail appearance   HENT:      Head: Normocephalic and atraumatic.      Right Ear: External ear normal.      Left Ear: External ear normal.      Nose: Nose normal.      Mouth/Throat:      Mouth: Mucous membranes are dry.      Pharynx: Oropharynx is clear.   Eyes:      General: No scleral icterus.     Comments: B/L cataracts   Cardiovascular:      Rate and Rhythm: Regular rhythm. Tachycardia present.      Pulses: Normal pulses.      Heart sounds: Normal heart sounds. No murmur heard.  Pulmonary:      Effort: Pulmonary effort is normal. No respiratory distress.      Breath sounds: Normal breath sounds. No wheezing, rhonchi or rales.   Abdominal:      General: Bowel sounds are normal. There is no distension.      Tenderness: There is no abdominal tenderness. There is no right CVA tenderness, left CVA tenderness, guarding or rebound.   Musculoskeletal:         General: Normal range of motion.      Cervical back: Normal range of motion and neck supple.      Right lower leg: Edema present.      Left lower leg: Edema present.   Skin:     General: Skin is warm and dry.      Capillary Refill: Capillary refill takes less than 2 seconds.      Findings: No rash.   Neurological:      General: No focal deficit present.      Mental Status: She is alert. Mental status is at baseline.      Cranial Nerves: No cranial nerve deficit.                 Significant Labs: All pertinent labs within the past 24 hours have been reviewed.    Significant Imaging: I have reviewed all pertinent imaging results/findings within the past 24 hours.    Assessment/Plan:     * Elevated troponin  Mental disability. Patient denies active chest pain  hemodynamically stable  EKG sinus tachycardia   Elevated troponin 686, 926, 698, 543  Consult cardiology in the morning  Monitor vital signs an physical exam      Mental disability  Per family patient has been disable since birth. Family unclear on the cause.  Per family at baseline patient can feed herself but needs assistance with feeding most of the time. She walks to the bathroom and back to her bedroom on her own and stays most of the time lying in bed. She has a caretaker who is planning on coming to stay with her in the hospital tomorrow.    Hypotension  Continue home Losartan 25mg QD and Metoprolol 25mg BID  Monitor BP    VTE Risk Mitigation (From admission, onward)         Ordered     enoxaparin injection 40 mg  Every 24 hours         08/19/23 2101     IP VTE LOW RISK PATIENT  Once         08/19/23 2101     Place sequential compression device  Until discontinued         08/19/23 2101                           Armand Velasquez MD  Department of Hospital Medicine  [facility]

## 2023-08-20 NOTE — ASSESSMENT & PLAN NOTE
Mental disability. Patient denies active chest pain  hemodynamically stable  EKG sinus tachycardia   Elevated troponin 686, 926, 698, 543  Consult cardiology in the morning  Monitor vital signs an physical exam

## 2023-08-20 NOTE — HPI
56 y/o female with PMHx of intellectual disability, HTN and blindness who presents to [facility] transferred from [facility] due to elevated troponin. Patient is not able to provide a history. History from her caretaker who is present in the room and chart review. Per family, she was brought to the hospital because she has been extremely weak and had a fall yesterday at home that was not witnessed, she was found on the floor alert. He states that at baseline the patient does not voluntarily hold a conversation since she cannot elaborate phrases. Patient can feed herself but needs assistance most of the times. She walks to the bathroom and back to her bedroom on her own and stays most of the time lying in bed. Patient became disabled due to problems during birth. Patient's brother denies history of MI or stroke. Patient has a caretaker that helps with daily chores and family lives next door.     In the ED vital signs showed /88, , RR 20, SpO2 98% at room air, afebrile. Blood work showed H/H 10.3/31.8, WBC 7.22, , sodium 145 potassium 3.1, BUN/Cr 16/0.75, glucose 168, CRP 8.84, lipid panel no hypercholesterolemia, p-, elevated troponin 686, 926, 698, 543, EKG nsr with HR 93. CT head showing no acute intracranial abnormalities. Chest xray showing no infiltrates or consolidation.

## 2023-08-20 NOTE — HPI
Patient is a 56 y/o female with PMHx of HTN, mental disability and blindness who presents to [facility] transferred from [facility] due to elevated troponin. Patient is not able to provide a history. History was taken from her brother present in the room. Patient's brother states that he took the patient to the hospital because she has been extreme weak and had a fall yesterday at home that was not witnessed, she was found on the floor alert. Patient's brother cannot precise how long patient was on the floor. He states that at baseline the patient does not voluntarily hold a conversation since she cannot elaborate phrases. Patient can feed herself but needs assistance most of the times. She walks to the bathroom and back to her bedroom on her own and stays most of the time lying in bed. Patient became disabled due to problems during birth. Patient's brother denies history of MI or stroke. Patient has a caretaker that helps with daily chores and family lives next door.    In the ED vital signs showed /88, , RR 20, SpO2 98% at room air, afebrile. Blood work showed H/H 10.3/31.8, WBC 7.22, , sodium 145 potassium 3.1, BUN/Cr 16/0.75, glucose 168, CRP 8.84, lipid panel no hypercholesterolemia, p-, elevated troponin 686, 926, 698, 543, EKG nsr with HR 93. CT head showing no acute intracranial abnormalities. Chest xray showing no infiltrates or consolidation. Patient will be admitted to the hospital for further management.

## 2023-08-20 NOTE — PROGRESS NOTES
[facility] - [facility] Tele[facility] Hospital Medicine  Progress Note    Patient Name: Kenia Carias  MRN: [id number]  Patient Class: IP- Inpatient   Admission Date: 8/19/2023  Length of Stay: 1 days  Attending Physician: Apolonia Hubbard DO  Primary Care Provider: Elaine Clark MD        Subjective:     Principal Problem:Elevated troponin        HPI:  Patient is a 56 y/o female with PMHx of HTN, mental disability and blindness who presents to [facility] transferred from [facility] due to elevated troponin. Patient is not able to provide a history. History was taken from her brother present in the room. Patient's brother states that he took the patient to the hospital because she has been extreme weak and had a fall yesterday at home that was not witnessed, she was found on the floor alert. Patient's brother cannot precise how long patient was on the floor. He states that at baseline the patient does not voluntarily hold a conversation since she cannot elaborate phrases. Patient can feed herself but needs assistance most of the times. She walks to the bathroom and back to her bedroom on her own and stays most of the time lying in bed. Patient became disabled due to problems during birth. Patient's brother denies history of MI or stroke. Patient has a caretaker that helps with daily chores and family lives next door.    In the ED vital signs showed /88, , RR 20, SpO2 98% at room air, afebrile. Blood work showed H/H 10.3/31.8, WBC 7.22, , sodium 145 potassium 3.1, BUN/Cr 16/0.75, glucose 168, CRP 8.84, lipid panel no hypercholesterolemia, p-, elevated troponin 686, 926, 698, 543, EKG nsr with HR 93. CT head showing no acute intracranial abnormalities. Chest xray showing no infiltrates or consolidation. Patient will be admitted to the hospital for further management.      Overview/Hospital Course:  No notes on file    Interval History: Patient seen and examined at bedside.  Reports feeling well. Denies angina, dyspnea, HA, or abdominal discomfort. Will continue to follow. Cardiology consulted. CT head showed no acute events. Repeat ECG pending; ECHO pending. Trop pending; K 3.0 given 40 mEq will recheck levels in the morning. Bilirubin 2.0; AST 40; Albumin 2.5. Remain NPO until notice from cardiology. Purewick placed.     Review of Systems   Constitutional:  Negative for appetite change and fatigue.   Respiratory:  Negative for chest tightness and shortness of breath.    Cardiovascular:  Negative for chest pain.   Gastrointestinal:  Negative for abdominal distention and abdominal pain.   Neurological:  Negative for light-headedness and headaches.     Objective:     Vital Signs (Most Recent):  Temp: 98.6 °F (37 °C) (08/20/23 1100)  Pulse: 108 (08/20/23 1100)  Resp: 18 (08/20/23 1100)  BP: (!) 148/80 (08/20/23 1100)  SpO2: 96 % (08/20/23 1100) Vital Signs (24h Range):  Temp:  [98.6 °F (37 °C)-99.8 °F (37.7 °C)] 98.6 °F (37 °C)  Pulse:  [] 108  Resp:  [18-30] 18  SpO2:  [96 %-99 %] 96 %  BP: ()/(61-88) 148/80     Weight: 78 kg (172 lb)  Body mass index is 24.68 kg/m².  No intake or output data in the 24 hours ending 08/20/23 1347      Physical Exam  Vitals and nursing note reviewed.   Constitutional:       General: She is not in acute distress.     Appearance: Normal appearance. She is not ill-appearing.   HENT:      Head: Normocephalic and atraumatic.   Eyes:      Comments: Blind   Cardiovascular:      Rate and Rhythm: Normal rate.      Heart sounds: No murmur heard.     No friction rub. No gallop.   Pulmonary:      Effort: No respiratory distress.      Breath sounds: No wheezing, rhonchi or rales.   Abdominal:      General: There is no distension.      Tenderness: There is no abdominal tenderness.   Musculoskeletal:      Right lower leg: No edema.      Left lower leg: No edema.      Comments: Left-side hand tremors    Skin:     Findings: No lesion or rash.   Neurological:       Mental Status: She is alert.             Significant Labs: All pertinent labs within the past 24 hours have been reviewed.    Significant Imaging: I have reviewed all pertinent imaging results/findings within the past 24 hours.      Assessment/Plan:      * Elevated troponin  08/20  - Repeated trops; pending results         Mental disability. Patient denies active chest pain  hemodynamically stable  EKG sinus tachycardia   Elevated troponin 686, 926, 698, 543  Consult cardiology in the morning  Monitor vital signs an physical exam      Mental disability  Per family patient has been disable since birth. Family unclear on the cause.  Per family at baseline patient can feed herself but needs assistance with feeding most of the time. She walks to the bathroom and back to her bedroom on her own and stays most of the time lying in bed. She has a caretaker who is planning on coming to stay with her in the hospital tomorrow.    Hypotension  Continue home Losartan 25mg QD and Metoprolol 25mg BID  Monitor BP    Leukocytosis  08/20  -Resolved         Tremor of unknown origin  08/20  - Left hand tremor        VTE Risk Mitigation (From admission, onward)         Ordered     enoxaparin injection 40 mg  Every 24 hours         08/19/23 2101     IP VTE LOW RISK PATIENT  Once         08/19/23 2101     Place sequential compression device  Until discontinued         08/19/23 2101                Discharge Planning   PAWEL:      Code Status: Full Code   Is the patient medically ready for discharge?:     Reason for patient still in hospital (select all that apply): Treatment                     Boris Lemons MD  Department of Hospital Medicine   [facility]

## 2023-08-20 NOTE — SUBJECTIVE & OBJECTIVE
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    furosemide (LASIX) 20 MG tablet Take 1 tablet by mouth twice a week. Takes Monday and Thursday as needed for swelling in legs    losartan (COZAAR) 25 MG tablet Take 25 mg by mouth once daily.    metoprolol tartrate (LOPRESSOR) 25 MG tablet Take 25 mg by mouth 2 (two) times daily.    paroxetine (PAXIL) 20 MG tablet Take 1 tablet by mouth once daily.    potassium citrate 99 mg Cap Take 1 tablet by mouth twice a week.    rosuvastatin (CRESTOR) 5 MG tablet Take 5 mg by mouth once daily.     Family History    None       Tobacco Use    Smoking status: Never    Smokeless tobacco: Never   Substance and Sexual Activity    Alcohol use: Never    Drug use: Not on file    Sexual activity: Never     Review of Systems   Reason unable to perform ROS: Due to intellectual disability at baseline.     Objective:     Vital Signs (Most Recent):  Temp: 98 °F (36.7 °C) (08/20/23 1608)  Pulse: 107 (08/20/23 1608)  Resp: 19 (08/20/23 1608)  BP: (!) 140/79 (08/20/23 1608)  SpO2: 95 % (08/20/23 1608) Vital Signs (24h Range):  Temp:  [98 °F (36.7 °C)-99.8 °F (37.7 °C)] 98 °F (36.7 °C)  Pulse:  [] 107  Resp:  [18-27] 19  SpO2:  [95 %-99 %] 95 %  BP: (103-148)/(61-88) 140/79     Weight: 78 kg (172 lb)  Body mass index is 24.68  kg/m².    SpO2: 95 %       No intake or output data in the 24 hours ending 08/20/23 1619    Lines/Drains/Airways       Peripheral Intravenous Line  Duration                  Peripheral IV - Single Lumen 08/19/23 2123 20 G Left;Posterior Hand <1 day                     Physical Exam  Constitutional:       General: She is not in acute distress.     Appearance: Normal appearance.   HENT:      Head: Normocephalic and atraumatic.   Neck:      Comments: No JVD  Cardiovascular:      Rate and Rhythm: Normal rate.      Heart sounds: No murmur heard.     No friction rub. No gallop.   Pulmonary:      Breath sounds: No wheezing, rhonchi or rales.   Musculoskeletal:      Right lower leg: No edema.      Left lower leg: No edema.   Skin:     General: Skin is warm and dry.   Neurological:      Mental Status: She is alert.          Significant Labs: All pertinent lab results from the last 24 hours have been reviewed.    Significant Imaging: Echocardiogram: Transthoracic echo (TTE) complete (Cupid Only):   Results for orders placed or performed during the hospital encounter of 08/19/23   Echo   Result Value Ref Range    BSA 1.96 m2    LVOT stroke volume 67.03 cm3    LVIDd 4.73 3.5 - 6.0 cm    LV Systolic Volume 53.04 mL    LV Systolic Volume Index 27.1 mL/m2    LVIDs 3.56 2.1 - 4.0 cm    LV Diastolic Volume 103.81 mL    LV Diastolic Volume Index 52.96 mL/m2    IVS 0.96 0.6 - 1.1 cm    LVOT diameter 2.12 cm    LVOT area 3.5 cm2    FS 25 (A) 28 - 44 %    Left Ventricle Relative Wall Thickness 0.38 cm    Posterior Wall 0.91 0.6 - 1.1 cm    LV mass 151.75 g    LV Mass Index 77 g/m2    LVOT peak vivien 1.22 m/s    Left Ventricular Outflow Tract Mean Velocity 0.87 cm/s    Left Ventricular Outflow Tract Mean Gradient 3.29 mmHg    TAPSE 1.99 cm    RA area 12.0 cm2    AV mean gradient 4 mmHg    AV peak gradient 8 mmHg    Ao peak vivien 1.40 m/s    Ao VTI 22.70 cm    LVOT peak VTI 19.00 cm    AV valve area 2.95 cm²    AV Velocity Ratio 0.87     AV  index (prosthetic) 0.84     YAMILET by Velocity Ratio 3.07 cm²    Ao root annulus 3.52 cm    IVC diameter 1.32 cm    ZLVIDS 0.25     ZLVIDD -1.71     AORTIC VALVE CUSP SEPERATION 2.25 cm    LA size 2.6 cm    RVDD 2.70 cm    Bains's Biplane MOD Ejection Fraction 55 %    Narrative      Left Ventricle: The left ventricle is normal in size. Ventricular mass   is normal. Normal wall thickness. Normal wall motion. There is normal   systolic function. Biplane (2D) method of discs ejection fraction is 55%.    Right Ventricle: Normal right ventricular cavity size. Systolic   function is normal.    Pulmonary Artery: Pulmonary artery pressure could not be accurately   determined.

## 2023-08-20 NOTE — SUBJECTIVE & OBJECTIVE
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        Past Surgical History:   Procedure Laterality Date    EYE SURGERY      HYSTERECTOMY         Review of patient's allergies indicates:  No Known Allergies    Current Facility-Administered Medications on File Prior to Encounter   Medication    [COMPLETED] 0.9%  NaCl infusion    [COMPLETED] 0.9%  NaCl infusion    [COMPLETED] aspirin tablet 325 mg    [COMPLETED] cefTRIAXone (ROCEPHIN) 1 g in dextrose 5 % in water (D5W) 100 mL IVPB (MB+)    [COMPLETED] clonazePAM tablet 1 mg    [COMPLETED] furosemide injection 20 mg    [COMPLETED] metoprolol tartrate (LOPRESSOR) tablet 25 mg    [DISCONTINUED] atorvastatin tablet 80 mg    [DISCONTINUED] losartan tablet 25 mg    [DISCONTINUED] metoprolol injection 2.5 mg    [DISCONTINUED] metoprolol injection 2.5 mg     Current Outpatient Medications on File Prior to Encounter   Medication Sig    clonazePAM (KLONOPIN) 1 MG tablet Take 1 mg by mouth nightly as needed.    furosemide (LASIX) 20 MG tablet Take 1 tablet by mouth twice a week. Takes Monday and Thursday as needed for swelling in legs    losartan (COZAAR) 25 MG tablet Take 25 mg by mouth once daily.    metoprolol tartrate (LOPRESSOR) 25 MG tablet Take 25 mg by mouth 2 (two) times daily.    paroxetine (PAXIL) 20 MG tablet Take 1 tablet by mouth once daily.    potassium citrate 99 mg Cap Take 1 tablet by mouth twice a week.    rosuvastatin (CRESTOR) 5 MG tablet Take 5 mg by mouth once daily.     Family History    None       Tobacco Use    Smoking status: Never    Smokeless tobacco: Never   Substance and Sexual Activity    Alcohol use: Never    Drug use: Not on file    Sexual activity: Never     Review of Systems   Unable to perform ROS: Other     Objective:     Vital Signs (Most Recent):  Temp: 98.9 °F (37.2 °C) (08/19/23 1930)  Pulse:  108 (08/19/23 1930)  Resp: 20 (08/19/23 1930)  BP: 115/88 (08/19/23 1930)  SpO2: 98 % (08/19/23 1930) Vital Signs (24h Range):  Temp:  [98.9 °F (37.2 °C)-99.2 °F (37.3 °C)] 98.9 °F (37.2 °C)  Pulse:  [] 108  Resp:  [17-32] 20  SpO2:  [94 %-100 %] 98 %  BP: ()/(56-88) 115/88     Weight: 78.2 kg (172 lb 6.4 oz)  Body mass index is 24.74 kg/m².     Physical Exam  Vitals and nursing note reviewed.   Constitutional:       General: She is not in acute distress.     Appearance: She is not toxic-appearing.      Comments: Frail appearance   HENT:      Head: Normocephalic and atraumatic.      Right Ear: External ear normal.      Left Ear: External ear normal.      Nose: Nose normal.      Mouth/Throat:      Mouth: Mucous membranes are dry.      Pharynx: Oropharynx is clear.   Eyes:      General: No scleral icterus.     Comments: B/L cataracts   Cardiovascular:      Rate and Rhythm: Regular rhythm. Tachycardia present.      Pulses: Normal pulses.      Heart sounds: Normal heart sounds. No murmur heard.  Pulmonary:      Effort: Pulmonary effort is normal. No respiratory distress.      Breath sounds: Normal breath sounds. No wheezing, rhonchi or rales.   Abdominal:      General: Bowel sounds are normal. There is no distension.      Tenderness: There is no abdominal tenderness. There is no right CVA tenderness, left CVA tenderness, guarding or rebound.   Musculoskeletal:         General: Normal range of motion.      Cervical back: Normal range of motion and neck supple.      Right lower leg: Edema present.      Left lower leg: Edema present.   Skin:     General: Skin is warm and dry.      Capillary Refill: Capillary refill takes less than 2 seconds.      Findings: No rash.   Neurological:      General: No focal deficit present.      Mental Status: She is alert. Mental status is at baseline.      Cranial Nerves: No cranial nerve deficit.                Significant Labs: All pertinent labs within the past 24 hours have  been reviewed.    Significant Imaging: I have reviewed all pertinent imaging results/findings within the past 24 hours.

## 2023-08-21 LAB
ALBUMIN SERPL BCP-MCNC: 2.5 G/DL (ref 3.5–5)
ALBUMIN/GLOB SERPL: 0.7 {RATIO}
ALP SERPL-CCNC: 78 U/L (ref 46–118)
ALT SERPL W P-5'-P-CCNC: 55 U/L (ref 13–56)
ANION GAP SERPL CALCULATED.3IONS-SCNC: 10 MMOL/L (ref 7–16)
AST SERPL W P-5'-P-CCNC: 47 U/L (ref 15–37)
BASOPHILS # BLD AUTO: 0.01 K/UL (ref 0–0.2)
BASOPHILS NFR BLD AUTO: 0.1 % (ref 0–1)
BILIRUB SERPL-MCNC: 1.7 MG/DL (ref ?–1.2)
BUN SERPL-MCNC: 12 MG/DL (ref 7–18)
BUN/CREAT SERPL: 29 (ref 6–20)
CALCIUM SERPL-MCNC: 9.2 MG/DL (ref 8.5–10.1)
CATH EF QUANTITATIVE: 60 %
CHLORIDE SERPL-SCNC: 113 MMOL/L (ref 98–107)
CO2 SERPL-SCNC: 24 MMOL/L (ref 21–32)
CREAT SERPL-MCNC: 0.41 MG/DL (ref 0.55–1.02)
DIFFERENTIAL METHOD BLD: ABNORMAL
EGFR (NO RACE VARIABLE) (RUSH/TITUS): 116 ML/MIN/1.73M2
EOSINOPHIL # BLD AUTO: 0.28 K/UL (ref 0–0.5)
EOSINOPHIL NFR BLD AUTO: 4.1 % (ref 1–4)
ERYTHROCYTE [DISTWIDTH] IN BLOOD BY AUTOMATED COUNT: 12.9 % (ref 11.5–14.5)
GLOBULIN SER-MCNC: 3.5 G/DL (ref 2–4)
GLUCOSE SERPL-MCNC: 97 MG/DL (ref 74–106)
HCT VFR BLD AUTO: 32.2 % (ref 38–47)
HGB BLD-MCNC: 10.4 G/DL (ref 12–16)
IMM GRANULOCYTES # BLD AUTO: 0.03 K/UL (ref 0–0.04)
IMM GRANULOCYTES NFR BLD: 0.4 % (ref 0–0.4)
LYMPHOCYTES # BLD AUTO: 1.49 K/UL (ref 1–4.8)
LYMPHOCYTES NFR BLD AUTO: 22 % (ref 27–41)
MCH RBC QN AUTO: 24.6 PG (ref 27–31)
MCHC RBC AUTO-ENTMCNC: 32.3 G/DL (ref 32–36)
MCV RBC AUTO: 76.3 FL (ref 80–96)
MONOCYTES # BLD AUTO: 0.56 K/UL (ref 0–0.8)
MONOCYTES NFR BLD AUTO: 8.3 % (ref 2–6)
MPC BLD CALC-MCNC: 11.9 FL (ref 9.4–12.4)
NEUTROPHILS # BLD AUTO: 4.41 K/UL (ref 1.8–7.7)
NEUTROPHILS NFR BLD AUTO: 65.1 % (ref 53–65)
NRBC # BLD AUTO: 0 X10E3/UL
NRBC, AUTO (.00): 0 %
PLATELET # BLD AUTO: 270 K/UL (ref 150–400)
POTASSIUM SERPL-SCNC: 3.6 MMOL/L (ref 3.5–5.1)
PROT SERPL-MCNC: 6 G/DL (ref 6.4–8.2)
RBC # BLD AUTO: 4.22 M/UL (ref 4.2–5.4)
SODIUM SERPL-SCNC: 143 MMOL/L (ref 136–145)
WBC # BLD AUTO: 6.78 K/UL (ref 4.5–11)

## 2023-08-21 PROCEDURE — 25000003 PHARM REV CODE 250: Performed by: INTERNAL MEDICINE

## 2023-08-21 PROCEDURE — 25000003 PHARM REV CODE 250: Performed by: GENERAL PRACTICE

## 2023-08-21 PROCEDURE — 63600175 PHARM REV CODE 636 W HCPCS: Performed by: GENERAL PRACTICE

## 2023-08-21 PROCEDURE — 25000003 PHARM REV CODE 250

## 2023-08-21 PROCEDURE — 85025 COMPLETE CBC W/AUTO DIFF WBC: CPT | Performed by: GENERAL PRACTICE

## 2023-08-21 PROCEDURE — 93458 L HRT ARTERY/VENTRICLE ANGIO: CPT | Performed by: INTERNAL MEDICINE

## 2023-08-21 PROCEDURE — 99232 PR SUBSEQUENT HOSPITAL CARE,LEVL II: ICD-10-PCS | Mod: GC,,, | Performed by: FAMILY MEDICINE

## 2023-08-21 PROCEDURE — 27201423 OPTIME MED/SURG SUP & DEVICES STERILE SUPPLY: Performed by: INTERNAL MEDICINE

## 2023-08-21 PROCEDURE — 11000001 HC ACUTE MED/SURG PRIVATE ROOM

## 2023-08-21 PROCEDURE — 25500020 PHARM REV CODE 255: Performed by: INTERNAL MEDICINE

## 2023-08-21 PROCEDURE — 99152 MOD SED SAME PHYS/QHP 5/>YRS: CPT | Performed by: INTERNAL MEDICINE

## 2023-08-21 PROCEDURE — 93458 PR CATH PLACE/CORON ANGIO, IMG SUPER/INTERP,W LEFT HEART VENTRICULOGRAPHY: ICD-10-PCS | Mod: 26,,, | Performed by: INTERNAL MEDICINE

## 2023-08-21 PROCEDURE — C1894 INTRO/SHEATH, NON-LASER: HCPCS | Performed by: INTERNAL MEDICINE

## 2023-08-21 PROCEDURE — 80053 COMPREHEN METABOLIC PANEL: CPT | Performed by: GENERAL PRACTICE

## 2023-08-21 PROCEDURE — 63600175 PHARM REV CODE 636 W HCPCS: Performed by: INTERNAL MEDICINE

## 2023-08-21 PROCEDURE — 99152 PR MOD CONSCIOUS SEDATION, SAME PHYS, 5+ YRS, FIRST 15 MIN: ICD-10-PCS | Mod: ,,, | Performed by: INTERNAL MEDICINE

## 2023-08-21 PROCEDURE — 99152 MOD SED SAME PHYS/QHP 5/>YRS: CPT | Mod: ,,, | Performed by: INTERNAL MEDICINE

## 2023-08-21 PROCEDURE — 93458 L HRT ARTERY/VENTRICLE ANGIO: CPT | Mod: 26,,, | Performed by: INTERNAL MEDICINE

## 2023-08-21 PROCEDURE — C1760 CLOSURE DEV, VASC: HCPCS | Performed by: INTERNAL MEDICINE

## 2023-08-21 PROCEDURE — 99232 SBSQ HOSP IP/OBS MODERATE 35: CPT | Mod: GC,,, | Performed by: FAMILY MEDICINE

## 2023-08-21 DEVICE — ANGIO-SEAL VIP VASCULAR CLOSURE DEVICE
Type: IMPLANTABLE DEVICE | Site: GROIN | Status: FUNCTIONAL
Brand: ANGIO-SEAL

## 2023-08-21 RX ORDER — LIDOCAINE HYDROCHLORIDE 10 MG/ML
INJECTION INFILTRATION; PERINEURAL
Status: DISCONTINUED | OUTPATIENT
Start: 2023-08-21 | End: 2023-08-21 | Stop reason: HOSPADM

## 2023-08-21 RX ORDER — SODIUM CHLORIDE 450 MG/100ML
INJECTION, SOLUTION INTRAVENOUS
Status: DISCONTINUED | OUTPATIENT
Start: 2023-08-21 | End: 2023-08-25 | Stop reason: HOSPADM

## 2023-08-21 RX ORDER — MIDAZOLAM HYDROCHLORIDE 1 MG/ML
INJECTION INTRAMUSCULAR; INTRAVENOUS
Status: DISCONTINUED | OUTPATIENT
Start: 2023-08-21 | End: 2023-08-21 | Stop reason: HOSPADM

## 2023-08-21 RX ORDER — FENTANYL CITRATE 50 UG/ML
INJECTION, SOLUTION INTRAMUSCULAR; INTRAVENOUS
Status: DISCONTINUED | OUTPATIENT
Start: 2023-08-21 | End: 2023-08-21 | Stop reason: HOSPADM

## 2023-08-21 RX ADMIN — ASPIRIN 81 MG: 81 TABLET, COATED ORAL at 09:08

## 2023-08-21 RX ADMIN — METOPROLOL TARTRATE 25 MG: 25 TABLET, FILM COATED ORAL at 09:08

## 2023-08-21 RX ADMIN — LOSARTAN POTASSIUM 25 MG: 25 TABLET, FILM COATED ORAL at 09:08

## 2023-08-21 RX ADMIN — CLONAZEPAM 1 MG: 0.5 TABLET ORAL at 06:08

## 2023-08-21 RX ADMIN — METOPROLOL TARTRATE 25 MG: 25 TABLET, FILM COATED ORAL at 10:08

## 2023-08-21 RX ADMIN — ATORVASTATIN CALCIUM 80 MG: 80 TABLET, FILM COATED ORAL at 09:08

## 2023-08-21 RX ADMIN — ENOXAPARIN SODIUM 40 MG: 100 INJECTION SUBCUTANEOUS at 05:08

## 2023-08-21 RX ADMIN — PAROXETINE 20 MG: 20 TABLET, FILM COATED ORAL at 09:08

## 2023-08-21 NOTE — SUBJECTIVE & OBJECTIVE
Interval History: Patient was seen and examined at bedside. Reports feeling well today. LHC done today showed no blockages. Echo ejection fraction 55%. Updated patient sister-in-law. Possible discharge soon.     Review of Systems   Constitutional:  Negative for activity change and appetite change.   Respiratory:  Negative for chest tightness and shortness of breath.    Cardiovascular:  Negative for chest pain.   Gastrointestinal:  Negative for abdominal distention, abdominal pain, nausea and vomiting.   Neurological:  Negative for dizziness, light-headedness and headaches.     Objective:     Vital Signs (Most Recent):  Temp: 97.8 °F (36.6 °C) (08/21/23 1200)  Pulse: 79 (08/21/23 1430)  Resp: 16 (08/21/23 1430)  BP: 124/70 (08/21/23 1430)  SpO2: 97 % (08/21/23 1430) Vital Signs (24h Range):  Temp:  [97.8 °F (36.6 °C)-98.7 °F (37.1 °C)] 97.8 °F (36.6 °C)  Pulse:  [] 79  Resp:  [16-19] 16  SpO2:  [95 %-100 %] 97 %  BP: (116-140)/(62-80) 124/70     Weight: 79.1 kg (174 lb 6.1 oz)  Body mass index is 25.02 kg/m².  No intake or output data in the 24 hours ending 08/21/23 1544      Physical Exam  Vitals and nursing note reviewed.   Constitutional:       General: She is not in acute distress.     Appearance: Normal appearance. She is not ill-appearing.   HENT:      Head: Normocephalic and atraumatic.   Cardiovascular:      Rate and Rhythm: Normal rate.      Heart sounds: No murmur heard.     No friction rub. No gallop.   Pulmonary:      Breath sounds: No wheezing or rales.   Chest:      Chest wall: No tenderness.   Abdominal:      General: There is no distension.      Palpations: There is no mass.      Tenderness: There is no abdominal tenderness.   Musculoskeletal:      Right lower leg: No edema.      Left lower leg: No edema.   Neurological:      Mental Status: She is alert.             Significant Labs: All pertinent labs within the past 24 hours have been reviewed.    Significant Imaging: I have reviewed all  pertinent imaging results/findings within the past 24 hours.

## 2023-08-21 NOTE — PLAN OF CARE
Sw went to bedside to speak with pt and complete dcp initial assessment. Pt being wheeled to cath lab at the time. Sw to f/u upon pt's return to room. Ss following for dc needs and recommendations.

## 2023-08-21 NOTE — PROGRESS NOTES
[facility] - Cath Lab Hospital Medicine  Progress Note    Patient Name: Kenia Carias  MRN: [id number]  Patient Class: IP- Inpatient   Admission Date: 8/19/2023  Length of Stay: 2 days  Attending Physician: Apolonia Hubbard DO  Primary Care Provider: Elaine Clark MD        Subjective:     Principal Problem:Elevated troponin        HPI:  Patient is a 56 y/o female with PMHx of HTN, mental disability and blindness who presents to [facility] transferred from [facility] due to elevated troponin. Patient is not able to provide a history. History was taken from her brother present in the room. Patient's brother states that he took the patient to the hospital because she has been extreme weak and had a fall yesterday at home that was not witnessed, she was found on the floor alert. Patient's brother cannot precise how long patient was on the floor. He states that at baseline the patient does not voluntarily hold a conversation since she cannot elaborate phrases. Patient can feed herself but needs assistance most of the times. She walks to the bathroom and back to her bedroom on her own and stays most of the time lying in bed. Patient became disabled due to problems during birth. Patient's brother denies history of MI or stroke. Patient has a caretaker that helps with daily chores and family lives next door.    In the ED vital signs showed /88, , RR 20, SpO2 98% at room air, afebrile. Blood work showed H/H 10.3/31.8, WBC 7.22, , sodium 145 potassium 3.1, BUN/Cr 16/0.75, glucose 168, CRP 8.84, lipid panel no hypercholesterolemia, p-, elevated troponin 686, 926, 698, 543, EKG nsr with HR 93. CT head showing no acute intracranial abnormalities. Chest xray showing no infiltrates or consolidation. Patient will be admitted to the hospital for further management.      Overview/Hospital Course:  No notes on file    Interval History: Patient was seen and examined at bedside. Reports  feeling well today. LHC done today showed no blockages. Echo ejection fraction 55%. Updated patient sister-in-law. Possible discharge soon.     Review of Systems   Constitutional:  Negative for activity change and appetite change.   Respiratory:  Negative for chest tightness and shortness of breath.    Cardiovascular:  Negative for chest pain.   Gastrointestinal:  Negative for abdominal distention, abdominal pain, nausea and vomiting.   Neurological:  Negative for dizziness, light-headedness and headaches.     Objective:     Vital Signs (Most Recent):  Temp: 97.8 °F (36.6 °C) (08/21/23 1200)  Pulse: 79 (08/21/23 1430)  Resp: 16 (08/21/23 1430)  BP: 124/70 (08/21/23 1430)  SpO2: 97 % (08/21/23 1430) Vital Signs (24h Range):  Temp:  [97.8 °F (36.6 °C)-98.7 °F (37.1 °C)] 97.8 °F (36.6 °C)  Pulse:  [] 79  Resp:  [16-19] 16  SpO2:  [95 %-100 %] 97 %  BP: (116-140)/(62-80) 124/70     Weight: 79.1 kg (174 lb 6.1 oz)  Body mass index is 25.02 kg/m².  No intake or output data in the 24 hours ending 08/21/23 1544      Physical Exam  Vitals and nursing note reviewed.   Constitutional:       General: She is not in acute distress.     Appearance: Normal appearance. She is not ill-appearing.   HENT:      Head: Normocephalic and atraumatic.   Cardiovascular:      Rate and Rhythm: Normal rate.      Heart sounds: No murmur heard.     No friction rub. No gallop.   Pulmonary:      Breath sounds: No wheezing or rales.   Chest:      Chest wall: No tenderness.   Abdominal:      General: There is no distension.      Palpations: There is no mass.      Tenderness: There is no abdominal tenderness.   Musculoskeletal:      Right lower leg: No edema.      Left lower leg: No edema.   Neurological:      Mental Status: She is alert.             Significant Labs: All pertinent labs within the past 24 hours have been reviewed.    Significant Imaging: I have reviewed all pertinent imaging results/findings within the past 24  hours.      Assessment/Plan:      * Elevated troponin  08/21  - Trops 442  -[facility] showed no blockage       08/20  - Repeated trops; pending results         Mental disability. Patient denies active chest pain  hemodynamically stable  EKG sinus tachycardia   Elevated troponin 686, 926, 698, 543  Consult cardiology in the morning  Monitor vital signs an physical exam      Mental disability  Per family patient has been disable since birth. Family unclear on the cause.  Per family at baseline patient can feed herself but needs assistance with feeding most of the time. She walks to the bathroom and back to her bedroom on her own and stays most of the time lying in bed. She has a caretaker who is planning on coming to stay with her in the hospital tomorrow.    Hypotension  Continue home Losartan 25mg QD and Metoprolol 25mg BID  Monitor BP    Leukocytosis  08/20  -Resolved         Tremor of unknown origin  08/20  - Left hand tremor        VTE Risk Mitigation (From admission, onward)         Ordered     enoxaparin injection 40 mg  Every 24 hours         08/19/23 2101     IP VTE LOW RISK PATIENT  Once         08/19/23 2101     Place sequential compression device  Until discontinued         08/19/23 2101                Discharge Planning   PAWEL:      Code Status: Full Code   Is the patient medically ready for discharge?:     Reason for patient still in hospital (select all that apply): Treatment                     Boris Lemons MD  Department of Hospital Medicine   [facility] - Cath Lab

## 2023-08-21 NOTE — PROGRESS NOTES
[facility]  Adult Nutrition  First Assessment Note         Reason for Assessment  Reason For Assessment: identified at risk by screening criteria (MST2)   Nutrition Risk Screen: no indicators present    Assessment and Plan  Patient assessed for MST2. She was admitted on 8/19 for elevated troponin. Patient was unavailable for interview this morning d/t procedure. Will obtain diet and weight history on follow-up.     Patient is 174 pounds with a BMI of 25.02 and is overweight. She is ordered a cardiac diet. Recommend continue current diet as able/appropriate and tolerated. Encourage good po intakes.    Last BM 8/19 per flowsheet.    Medications/labs reviewed. RD following.    Skin Integrity  Krishan Risk Assessment  Sensory Perception: 3-->slightly limited  Moisture: 3-->occasionally moist  Activity: 2-->chairfast  Mobility: 2-->very limited  Nutrition: 3-->adequate  Friction and Shear: 3-->no apparent problem  Krishan Score: 16      Malnutrition  Is patient malnourished? Unable to assess patient d/t out of room for procedure. Will follow up for diet and weight history.       Nutrition Diagnosis    No Nutritional Diagnosis at this time      Interventions/Recommendations (treatment strategy):  Recommend continue current diet as able/appropriate and tolerated. Encourage good po intakes.    Nutrition Diagnosis Status:   New     Nutrition Risk  Level of Risk/Frequency of Follow-up: low    Recent Labs   Lab 08/21/23  0326   GLU 97       Nutrition Prescription / Recommendations  Recommendation/Intervention: Recommend continue current diet as able/appropriate and tolerated. Encourage good po intakes.  Goals: Weight maintenance, intake % of meals  Nutrition Goal Status: new  Current Diet Order: Cardiac  Chewing or Swallowing Difficulty?: Poor Dentition  Recommended Diet: Heart Healthy  Recommended Oral Supplement: No Oral Supplements  Is Nutrition Support Recommended: No   Is Education Recommended:  "No  Monitor and Evaluation  % current Intake: New Diet order with no P.O. 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 Will monitor.     Last A1c:   Lab Results   Component Value Date    HGBA1C 5.0 08/19/2023     Lab Results   Component Value Date    RBC 4.22 08/21/2023    HGB 10.4 (L) 08/21/2023    HCT 32.2 (L) 08/21/2023    MCV 76.3 (L) 08/21/2023    MCH 24.6 (L) 08/21/2023    MCHC 32.3 08/21/2023     Pertinent Labs Reviewed: reviewed  Pertinent Labs Comments: Sodium: 143  Potassium: 3.6  Chloride: 113 (H)  CO2: 24  Anion Gap: 10  BUN: 12  Creatinine: 0.41 (L)  BUN/CREAT RATIO: 29 (H)  eGFR: 116  Glucose: 97  Calcium: 9.2  Alkaline Phosphatase: 78  PROTEIN TOTAL: 6.0 (L)  Albumin: 2.5 (L)  Albumin/Globulin Ratio: 0.7  BILIRUBIN TOTAL: 1.7 (H)  AST: 47 (H)  ALT: 55  Globulin, Total: 3.5  Pertinent Medications Reviewed: reviewed  Pertinent Medications Comments: ASA, atorvastatin, clonazepam, enoxaparin, losartan, metoprolol, paroxetine  Anthropometrics  Temp: 97.8 °F (36.6 °C)  Height: 5' 10" (177.8 cm)  Height (inches): 70 in  Weight Method: Bed Scale  Weight: 79.1 kg (174 lb 6.1 oz)  Weight (lb): 174.39 lb  Ideal Body Weight " (IBW), Female: 150 lb  % Ideal Body Weight, Female (lb): 114.67 %  BMI (Calculated): 25     Estimated/Assessed Needs  RMR (Allardt-St. Jeor Equation): 1466.25     Temp: 97.8 °F (36.6 °C)Oral  Weight Used For Calorie Calculations: 78.9 kg (174 lb)     Energy Calorie Requirements (kcal): 1973-2367kcal (25-30kcal/kg)  Weight Used For Protein Calculations: 78.9 kg (174 lb)  Protein Requirements: 63-79g (0.8-1.0g/kg)       RDA Method (mL): 1973     Nutrition by Nursing  Diet/Nutrition Received: NPO, sips of water           Last Bowel Movement: 08/19/23                Nutrition Follow-Up  RD Follow-up?: Yes      Tessie Espinoza MS, RD, LD  Available through Secure Chat

## 2023-08-21 NOTE — ASSESSMENT & PLAN NOTE
08/21  - Trops 442  -[facility] showed no blockage       08/20  - Repeated trops; pending results         Mental disability. Patient denies active chest pain  hemodynamically stable  EKG sinus tachycardia   Elevated troponin 686, 926, 698, 543  Consult cardiology in the morning  Monitor vital signs an physical exam

## 2023-08-22 LAB
ALBUMIN SERPL BCP-MCNC: 2.4 G/DL (ref 3.5–5)
ALBUMIN/GLOB SERPL: 0.6 {RATIO}
ALP SERPL-CCNC: 83 U/L (ref 46–118)
ALT SERPL W P-5'-P-CCNC: 51 U/L (ref 13–56)
ANION GAP SERPL CALCULATED.3IONS-SCNC: 9 MMOL/L (ref 7–16)
AST SERPL W P-5'-P-CCNC: 38 U/L (ref 15–37)
BASOPHILS # BLD AUTO: 0.01 K/UL (ref 0–0.2)
BASOPHILS NFR BLD AUTO: 0.2 % (ref 0–1)
BILIRUB SERPL-MCNC: 1.5 MG/DL (ref ?–1.2)
BUN SERPL-MCNC: 11 MG/DL (ref 7–18)
BUN/CREAT SERPL: 29 (ref 6–20)
CALCIUM SERPL-MCNC: 9.4 MG/DL (ref 8.5–10.1)
CHLORIDE SERPL-SCNC: 111 MMOL/L (ref 98–107)
CO2 SERPL-SCNC: 27 MMOL/L (ref 21–32)
CREAT SERPL-MCNC: 0.38 MG/DL (ref 0.55–1.02)
D DIMER PPP FEU-MCNC: 0.85 ΜG/ML (ref 0–0.47)
DIFFERENTIAL METHOD BLD: ABNORMAL
EGFR (NO RACE VARIABLE) (RUSH/TITUS): 119 ML/MIN/1.73M2
EOSINOPHIL # BLD AUTO: 0.21 K/UL (ref 0–0.5)
EOSINOPHIL NFR BLD AUTO: 4.6 % (ref 1–4)
ERYTHROCYTE [DISTWIDTH] IN BLOOD BY AUTOMATED COUNT: 12.8 % (ref 11.5–14.5)
FERRITIN SERPL-MCNC: 337 NG/ML (ref 8–252)
GLOBULIN SER-MCNC: 3.7 G/DL (ref 2–4)
GLUCOSE SERPL-MCNC: 93 MG/DL (ref 74–106)
HCT VFR BLD AUTO: 33.1 % (ref 38–47)
HGB BLD-MCNC: 10.4 G/DL (ref 12–16)
IMM GRANULOCYTES # BLD AUTO: 0.01 K/UL (ref 0–0.04)
IMM GRANULOCYTES NFR BLD: 0.2 % (ref 0–0.4)
IRON SATN MFR SERPL: 19 % (ref 14–50)
IRON SERPL-MCNC: 30 ΜG/DL (ref 50–170)
LYMPHOCYTES # BLD AUTO: 1.1 K/UL (ref 1–4.8)
LYMPHOCYTES NFR BLD AUTO: 24.2 % (ref 27–41)
MCH RBC QN AUTO: 24.5 PG (ref 27–31)
MCHC RBC AUTO-ENTMCNC: 31.4 G/DL (ref 32–36)
MCV RBC AUTO: 77.9 FL (ref 80–96)
MONOCYTES # BLD AUTO: 0.47 K/UL (ref 0–0.8)
MONOCYTES NFR BLD AUTO: 10.3 % (ref 2–6)
MPC BLD CALC-MCNC: 11.9 FL (ref 9.4–12.4)
NEUTROPHILS # BLD AUTO: 2.75 K/UL (ref 1.8–7.7)
NEUTROPHILS NFR BLD AUTO: 60.5 % (ref 53–65)
NRBC # BLD AUTO: 0 X10E3/UL
NRBC, AUTO (.00): 0 %
PLATELET # BLD AUTO: 286 K/UL (ref 150–400)
POTASSIUM SERPL-SCNC: 4.1 MMOL/L (ref 3.5–5.1)
PROT SERPL-MCNC: 6.1 G/DL (ref 6.4–8.2)
RBC # BLD AUTO: 4.25 M/UL (ref 4.2–5.4)
SODIUM SERPL-SCNC: 143 MMOL/L (ref 136–145)
T3 SERPL-MCNC: 193 NG/DL (ref 60–181)
T4 FREE SERPL-MCNC: 3.34 NG/DL (ref 0.76–1.46)
TIBC SERPL-MCNC: 159 ΜG/DL (ref 250–450)
TSH SERPL DL<=0.005 MIU/L-ACNC: <0.005 UIU/ML (ref 0.36–3.74)
WBC # BLD AUTO: 4.55 K/UL (ref 4.5–11)

## 2023-08-22 PROCEDURE — 11000001 HC ACUTE MED/SURG PRIVATE ROOM

## 2023-08-22 PROCEDURE — 85025 COMPLETE CBC W/AUTO DIFF WBC: CPT | Performed by: GENERAL PRACTICE

## 2023-08-22 PROCEDURE — 82728 ASSAY OF FERRITIN: CPT

## 2023-08-22 PROCEDURE — 84480 ASSAY TRIIODOTHYRONINE (T3): CPT

## 2023-08-22 PROCEDURE — 25500020 PHARM REV CODE 255: Performed by: FAMILY MEDICINE

## 2023-08-22 PROCEDURE — 83540 ASSAY OF IRON: CPT

## 2023-08-22 PROCEDURE — 63600175 PHARM REV CODE 636 W HCPCS: Performed by: GENERAL PRACTICE

## 2023-08-22 PROCEDURE — 84443 ASSAY THYROID STIM HORMONE: CPT

## 2023-08-22 PROCEDURE — 25000003 PHARM REV CODE 250: Performed by: GENERAL PRACTICE

## 2023-08-22 PROCEDURE — 83550 IRON BINDING TEST: CPT

## 2023-08-22 PROCEDURE — 97165 OT EVAL LOW COMPLEX 30 MIN: CPT

## 2023-08-22 PROCEDURE — 84439 ASSAY OF FREE THYROXINE: CPT

## 2023-08-22 PROCEDURE — 99232 SBSQ HOSP IP/OBS MODERATE 35: CPT | Mod: GC,,, | Performed by: FAMILY MEDICINE

## 2023-08-22 PROCEDURE — 99232 PR SUBSEQUENT HOSPITAL CARE,LEVL II: ICD-10-PCS | Mod: ,,, | Performed by: NURSE PRACTITIONER

## 2023-08-22 PROCEDURE — 97162 PT EVAL MOD COMPLEX 30 MIN: CPT

## 2023-08-22 PROCEDURE — 85379 FIBRIN DEGRADATION QUANT: CPT

## 2023-08-22 PROCEDURE — 94761 N-INVAS EAR/PLS OXIMETRY MLT: CPT

## 2023-08-22 PROCEDURE — 25000003 PHARM REV CODE 250: Performed by: INTERNAL MEDICINE

## 2023-08-22 PROCEDURE — 80053 COMPREHEN METABOLIC PANEL: CPT | Performed by: GENERAL PRACTICE

## 2023-08-22 PROCEDURE — 99232 SBSQ HOSP IP/OBS MODERATE 35: CPT | Mod: ,,, | Performed by: NURSE PRACTITIONER

## 2023-08-22 PROCEDURE — 25000003 PHARM REV CODE 250

## 2023-08-22 PROCEDURE — 99232 PR SUBSEQUENT HOSPITAL CARE,LEVL II: ICD-10-PCS | Mod: GC,,, | Performed by: FAMILY MEDICINE

## 2023-08-22 RX ORDER — LEVOTHYROXINE SODIUM 25 UG/1
25 TABLET ORAL
Status: DISCONTINUED | OUTPATIENT
Start: 2023-08-22 | End: 2023-08-22

## 2023-08-22 RX ORDER — METOPROLOL SUCCINATE 50 MG/1
50 TABLET, EXTENDED RELEASE ORAL DAILY
Status: DISCONTINUED | OUTPATIENT
Start: 2023-08-22 | End: 2023-08-25 | Stop reason: HOSPADM

## 2023-08-22 RX ADMIN — LOSARTAN POTASSIUM 25 MG: 25 TABLET, FILM COATED ORAL at 09:08

## 2023-08-22 RX ADMIN — METOPROLOL TARTRATE 25 MG: 25 TABLET, FILM COATED ORAL at 09:08

## 2023-08-22 RX ADMIN — ASPIRIN 81 MG: 81 TABLET, COATED ORAL at 09:08

## 2023-08-22 RX ADMIN — CLONAZEPAM 1 MG: 0.5 TABLET ORAL at 10:08

## 2023-08-22 RX ADMIN — LEVOTHYROXINE SODIUM 25 MCG: 0.03 TABLET ORAL at 09:08

## 2023-08-22 RX ADMIN — ATORVASTATIN CALCIUM 80 MG: 80 TABLET, FILM COATED ORAL at 09:08

## 2023-08-22 RX ADMIN — IOPAMIDOL 100 ML: 755 INJECTION, SOLUTION INTRAVENOUS at 03:08

## 2023-08-22 RX ADMIN — ENOXAPARIN SODIUM 40 MG: 100 INJECTION SUBCUTANEOUS at 04:08

## 2023-08-22 RX ADMIN — PAROXETINE 20 MG: 20 TABLET, FILM COATED ORAL at 09:08

## 2023-08-22 NOTE — PLAN OF CARE
Problem: Physical Therapy  Goal: Physical Therapy Goal  Description: Short Term Goals to be met by: 23    Patient will increase functional independence with mobility by performin. Supine to sit with Stand by assist  2. Sit to stand transfer with Stand by assist using hand held assist as needed  3. Bed to chair transfer with contact guard assist using hand held assist  4. Gait  x 100 feet with contact guard assist using hand held assist  5. Lower extremity exercise program x30 reps per handout, with assistance as needed  6. Pt to negotiate 2 steps with no rail with CGA and HHA    Long Term Goals to be met by: 23    Pt will regain full independent functional mobility with lowest level of assistive device to return to home situation and prior activities of daily living.   Outcome: Ongoing, Progressing     PT eval completed. Please see eval note for detail on current mobility status.

## 2023-08-22 NOTE — PT/OT/SLP EVAL
Occupational Therapy   Evaluation    Name: Kenia Carias  MRN: [id number]  Admitting Diagnosis: Elevated troponin  Recent Surgery: Procedure(s) (LRB):  Left heart cath (Left) 1 Day Post-Op    Recommendations:     Discharge Recommendations: nursing facility, skilled, other (see comments) (home with 24 hour assistance)  Discharge Equipment Recommendations:  other (see comments) (to be determined)  Barriers to discharge:  Other (Comment) (ongoing medical treatment)    Assessment:     Kenia Carias is a 57 y.o. female with a medical diagnosis of Elevated troponin.  She presents with weakness and decline in ADLs. Performance deficits affecting function: weakness, impaired endurance, impaired self care skills, impaired functional mobility, gait instability, impaired balance, decreased safety awareness.  Pt with tremors of BUE and BLE noted at rest as well as with activity. Pt with decreased safety with performing all mobility and ADL tasks. Pt will need 24 hour assist at discharge at this time.     Rehab Prognosis: Fair; patient would benefit from acute skilled OT services to address these deficits and reach maximum level of function.       Plan:     Patient to be seen 5 x/week to address the above listed problems via self-care/home management, therapeutic activities, therapeutic exercises  Plan of Care Expires: 09/19/23  Plan of Care Reviewed with: patient    Subjective     Chief Complaint: elevated troponin  Patient/Family Comments/goals: pt agreeable to OT eval     Occupational Profile:  Living Environment: pt lives at home with personal care assistant and brother lives next door  Previous level of function: receives assist with all ADL tasks and independent with functional mobility   Roles and Routines: assist with self care  Equipment Used at Home: none  Assistance upon Discharge: swing bed versus home with 24 hour assist    Pain/Comfort:  Pain Rating 1: 0/10    Patients cultural, spiritual, Orthodox conflicts given the  current situation: no    Objective:     Communicated with: HORTENCIA Truong prior to session.  Patient found HOB elevated with bed alarm, peripheral IV, telemetry upon OT entry to room.    General Precautions: Standard, fall  Orthopedic Precautions: N/A  Braces: N/A  Respiratory Status: Room air    Occupational Performance:    Bed Mobility:    Patient completed Supine to Sit with minimum assistance  Patient completed Sit to Supine with minimum assistance    Functional Mobility/Transfers:  Patient completed Sit <> Stand Transfer with minimum assistance and of 2 persons  with  hand-held assist   Functional Mobility: pt performed functional mobility in room with Cristal x 2 with hand held assist    Activities of Daily Living:  Upper Body Dressing: maximal assistance to abel gown as robe    Cognitive/Visual Perceptual:  Cognitive/Psychosocial Skills:     -       Oriented to: Person   -       Follows Commands/attention:Follows one-step commands    Physical Exam:  Balance:    -       sitting balance fair; standing balance poor   Upper Extremity Range of Motion:     -       Right Upper Extremity: WFL  -       Left Upper Extremity: WFL  Upper Extremity Strength:    -       Right Upper Extremity: 4/5  -       Left Upper Extremity: 4/5  Gross motor coordination:   WFL    AMPAC 6 Click ADL:  AMPAC Total Score: 13    Treatment & Education:  Pt educated on OT role/POC.   Importance of OOB activity with staff assistance.  Importance of sitting up in the chair throughout the day as tolerated, especially for meals   Safety during functional t/f and mobility with use of RW  Importance of assisting with self-care activities   All questions/concerns answered within OT scope of practice      Patient left HOB elevated with all lines intact, call button in reach, bed alarm on, and HORTENCIA Truong notified    GOALS:   Multidisciplinary Problems       Occupational Therapy Goals          Problem: Occupational Therapy    Goal Priority Disciplines Outcome  Interventions   Occupational Therapy Goal     OT, PT/OT Ongoing, Progressing    Description: ST.Pt will perform bathing with ModA with setup at EOB  2.Pt will perform UE dressing with Cristal  3.Pt will perform LE dressing with ModA  4.Pt will transfer bed/chair/bsc with CGA with hand held assist  5.Pt will perform standing task x 2 min with CGA with hand held assist  6.Tolerate 15 min of tx without fatigue.      LTG:   Restore to max I with selfcare and mobility.                           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

## 2023-08-22 NOTE — SUBJECTIVE & OBJECTIVE
Interval History: No complaints.    Review of Systems   Reason unable to perform ROS: Due to intellectual disability at baseline.     Objective:     Vital Signs (Most Recent):  Temp: 98.1 °F (36.7 °C) (08/22/23 1208)  Pulse: 91 (08/22/23 1208)  Resp: 18 (08/22/23 1208)  BP: (!) 130/90 (08/22/23 1208)  SpO2: 98 % (08/22/23 1208) Vital Signs (24h Range):  Temp:  [97.9 °F (36.6 °C)-99.4 °F (37.4 °C)] 98.1 °F (36.7 °C)  Pulse:  [77-99] 91  Resp:  [16-20] 18  SpO2:  [97 %-100 %] 98 %  BP: (106-152)/(64-90) 130/90     Weight: 79.1 kg (174 lb 6.1 oz)  Body mass index is 25.02 kg/m².     SpO2: 98 %       No intake or output data in the 24 hours ending 08/22/23 1328    Lines/Drains/Airways       Peripheral Intravenous Line  Duration                  Peripheral IV - Single Lumen 08/19/23 2123 20 G Left;Posterior Hand 2 days                       Physical Exam  Constitutional:       General: She is not in acute distress.     Appearance: Normal appearance.   Neck:      Comments: No JVD  Cardiovascular:      Rate and Rhythm: Normal rate and regular rhythm.      Pulses: Normal pulses.      Heart sounds: Normal heart sounds.   Pulmonary:      Effort: Pulmonary effort is normal.      Breath sounds: Normal breath sounds.   Musculoskeletal:      Right lower leg: No edema.      Left lower leg: No edema.   Skin:     General: Skin is warm and dry.   Neurological:      Mental Status: She is alert.            Significant Labs: All pertinent lab results from the last 24 hours have been reviewed. and   Recent Lab Results         08/22/23  1155   08/22/23  1152   08/22/23  0529        Albumin/Globulin Ratio     0.6       Albumin     2.4       Alkaline Phosphatase     83       ALT     51       Anion Gap     9       AST     38       Baso #     0.01       Basophil %     0.2       BILIRUBIN TOTAL     1.5       BUN     11       BUN/CREAT RATIO     29       Calcium     9.4       Chloride     111       CO2     27       Creatinine     0.38        D-Dimer 0.85           Differential Type     Auto       eGFR     119       Eos #     0.21       Eosinophil %     4.6       Ferritin 337           Free T4     3.34       Globulin, Total     3.7       Glucose     93       Hematocrit     33.1       Hemoglobin     10.4       Immature Grans (Abs)     0.01       Immature Granulocytes     0.2       Iron 30           Iron Saturation 19           Lymph #     1.10       Lymph %     24.2       MCH     24.5       MCHC     31.4       MCV     77.9       Mono #     0.47       Mono %     10.3       MPV     11.9       Neutrophils, Abs     2.75       Neutrophils Relative     60.5       nRBC     0.0       NUCLEATED RBC ABSOLUTE     0.00       Platelets     286       Potassium     4.1       PROTEIN TOTAL     6.1       RBC     4.25       RDW     12.8       Sodium     143       T3, Total   193         TIBC 159           TSH     <0.005       WBC     4.55               Significant Imaging: Echocardiogram: Transthoracic echo (TTE) complete (Cupid Only):   Results for orders placed or performed during the hospital encounter of 08/19/23   Echo   Result Value Ref Range    BSA 1.96 m2    LVOT stroke volume 67.03 cm3    LVIDd 4.73 3.5 - 6.0 cm    LV Systolic Volume 53.04 mL    LV Systolic Volume Index 27.1 mL/m2    LVIDs 3.56 2.1 - 4.0 cm    LV Diastolic Volume 103.81 mL    LV Diastolic Volume Index 52.96 mL/m2    IVS 0.96 0.6 - 1.1 cm    LVOT diameter 2.12 cm    LVOT area 3.5 cm2    FS 25 (A) 28 - 44 %    Left Ventricle Relative Wall Thickness 0.38 cm    Posterior Wall 0.91 0.6 - 1.1 cm    LV mass 151.75 g    LV Mass Index 77 g/m2    LVOT peak vivien 1.22 m/s    Left Ventricular Outflow Tract Mean Velocity 0.87 cm/s    Left Ventricular Outflow Tract Mean Gradient 3.29 mmHg    TAPSE 1.99 cm    RA area 12.0 cm2    AV mean gradient 4 mmHg    AV peak gradient 8 mmHg    Ao peak vivien 1.40 m/s    Ao VTI 22.70 cm    LVOT peak VTI 19.00 cm    AV valve area 2.95 cm²    AV Velocity Ratio 0.87     AV index  (prosthetic) 0.84     YAMILET by Velocity Ratio 3.07 cm²    Ao root annulus 3.52 cm    IVC diameter 1.32 cm    ZLVIDS 0.25     ZLVIDD -1.71     AORTIC VALVE CUSP SEPERATION 2.25 cm    LA size 2.6 cm    RVDD 2.70 cm    Bains's Biplane MOD Ejection Fraction 55 %    Narrative      Left Ventricle: The left ventricle is normal in size. Ventricular mass   is normal. Normal wall thickness. Normal wall motion. There is normal   systolic function. Biplane (2D) method of discs ejection fraction is 55%.    Right Ventricle: Normal right ventricular cavity size. Systolic   function is normal.    Pulmonary Artery: Pulmonary artery pressure could not be accurately   determined.

## 2023-08-22 NOTE — ASSESSMENT & PLAN NOTE
Elevated troponin level and EKG changes suggestive of anterior ischemia  Hx unreliable due to baseline intellectual disability     - Recommend coronary angiography for evaluation of coronary anatomy  - Risks versus benefits versus alternatives management options discussed with the patient's caregiver.  She notes that she makes healthcare decisions for the patient.  She also note that she gives her medications and there would be no concern for non-compliance  - NPO after midnight for [facility] tomorrow     08/22/23:  - LHC yesterday revealed normal coronaries  - Echo revealed normal EF of 55%, no significant WMA and no significant valvular abnormalities  - cardiology will sign off, please call if needed

## 2023-08-22 NOTE — PLAN OF CARE
[facility]  Initial [facility]charge Assessment       Primary Care Provider: Elaine Clark MD    Admission Diagnosis: Elevated troponin [R77.8]    Admission Date: 8/19/2023  Expected Discharge Date:     Transition of Care Barriers: None, Mobility    Payor: MEDICARE / Plan: MEDICARE PART A & B / Product Type: Government /     Extended Emergency Contact Information  Primary Emergency Contact: DAVID SZYMANSKI  Mobile Phone: [phone]  Relation: Brother  Preferred language: English   needed? No  Secondary Emergency Contact: Apolonia Szymanski  Mobile Phone: [phone]  Relation: Other  Preferred language: English   needed? No    Discharge Plan A: Home with family  Discharge Plan B: Home with family      [facility] [place] [address]  [address] [facility] [place] [address]  Phone: [phone] Fax: [phone]    [facility] [place] [facility] [address] [facility]  [address] [facility] [place] [address]  Phone: [phone] Fax: [phone]    [facility] [place] - [address]  [address] [facility] [address]  Phone: [phone] Fax: [phone]      Initial Assessment (most recent)       Adult Discharge Assessment - 08/22/23 1210          Discharge Assessment    Assessment Type Discharge Planning Assessment     Source of Information family;legal guardian     If unable to respond/provide information was family/caregiver contacted? Yes     Contact Name/Number David Szymanski (legal guardian/ brother) [phone]     Communicated PAWEL with patient/caregiver Date not available/Unable to determine     Reason For Admission elevated troponin     People in Home other relative(s)     Facility Arrived From: home     Do you expect to return to your current living situation? Yes     Do you have help at home or someone to help you manage your care at home? Yes     Who are your caregiver(s) and their  phone number(s)? Clau Bradshaw - personal care assistant     Prior to hospitilization cognitive status: Unable to Assess     Current cognitive status: Unable to Assess     Home Accessibility wheelchair accessible     Home Layout Able to live on 1st floor     Equipment Currently Used at Home none     Readmission within 30 days? No     Patient currently being followed by outpatient case management? No     Do you currently have service(s) that help you manage your care at home? No     Do you take prescription medications? Yes     Do you have prescription coverage? Yes     Do you have any problems affording any of your prescribed medications? No     Who is going to help you get home at discharge? Debbie Barrerapranav Zapatas (sister in law) [phone]     How do you get to doctors appointments? family or friend will provide     Are you on dialysis? No     Do you take coumadin? No     Discharge Plan discussed with: POA;Sibling     Name(s) and Number(s) David Carias (legal guardian/brother) [phone]     Transition of Care Barriers None;Mobility     Discharge Plan A Home with family     Discharge Plan B Home with family        Physical Activity    On average, how many days per week do you engage in moderate to strenuous exercise (like a brisk walk)? 0 days     On average, how many minutes do you engage in exercise at this level? 0 min        Financial Resource Strain    How hard is it for you to pay for the very basics like food, housing, medical care, and heating? Not hard at all        Housing Stability    In the last 12 months, was there a time when you were not able to pay the mortgage or rent on time? No     In the last 12 months, how many places have you lived? 1     In the last 12 months, was there a time when you did not have a steady place to sleep or slept in a shelter (including now)? No        Transportation Needs    In the past 12 months, has lack of transportation kept you from medical appointments or  from getting medications? No     In the past 12 months, has lack of transportation kept you from meetings, work, or from getting things needed for daily living? No        Food Insecurity    Within the past 12 months, you worried that your food would run out before you got the money to buy more. Never true     Within the past 12 months, the food you bought just didn't last and you didn't have money to get more. Never true        Stress    Do you feel stress - tense, restless, nervous, or anxious, or unable to sleep at night because your mind is troubled all the time - these days? Not at all        Social Connections    In a typical week, how many times do you talk on the phone with family, friends, or neighbors? Never     How often do you get together with friends or relatives? Once a week     How often do you attend Baptist or Moravian services? Never     Do you belong to any clubs or organizations such as Baptist groups, unions, fraternal or athletic groups, or school groups? No     How often do you attend meetings of the clubs or organizations you belong to? Never     Are you , , , , never , or living with a partner? Never         Alcohol Use    Q1: How often do you have a drink containing alcohol? Never     Q2: How many drinks containing alcohol do you have on a typical day when you are drinking? Patient does not drink     Q3: How often do you have six or more drinks on one occasion? Never        OTHER    Name(s) of People in Home clau Gagnon spoke with pt at bedside on this morning to complete dcp initial assessment. Pt unable to provide information and instructed marcio to speak with David Cairas (brother/ legal guardian) [phone]. Pta, pt lived home with personal care assistant Clau Bradshaw. No hh, no dme. Dcp is to return home with personal care assistant. Marcio following for dc orders and recommendations.

## 2023-08-22 NOTE — PROGRESS NOTES
[facility] - [facility] Telemetry  Cardiology  Progress Note    Patient Name: Kenia Carias  MRN: [id number]  Admission Date: 8/19/2023  Hospital Length of Stay: 3 days  Code Status: Full Code   Attending Physician: Adrian Swenson Jr., MD   Primary Care Physician: Elaine Clark MD  Expected Discharge Date:   Principal Problem:Elevated troponin    Subjective:     Interval History: No complaints.    Review of Systems   Reason unable to perform ROS: Due to intellectual disability at baseline.     Objective:     Vital Signs (Most Recent):  Temp: 98.1 °F (36.7 °C) (08/22/23 1208)  Pulse: 91 (08/22/23 1208)  Resp: 18 (08/22/23 1208)  BP: (!) 130/90 (08/22/23 1208)  SpO2: 98 % (08/22/23 1208) Vital Signs (24h Range):  Temp:  [97.9 °F (36.6 °C)-99.4 °F (37.4 °C)] 98.1 °F (36.7 °C)  Pulse:  [77-99] 91  Resp:  [16-20] 18  SpO2:  [97 %-100 %] 98 %  BP: (106-152)/(64-90) 130/90     Weight: 79.1 kg (174 lb 6.1 oz)  Body mass index is 25.02 kg/m².     SpO2: 98 %       No intake or output data in the 24 hours ending 08/22/23 1328    Lines/Drains/Airways       Peripheral Intravenous Line  Duration                  Peripheral IV - Single Lumen 08/19/23 2123 20 G Left;Posterior Hand 2 days                       Physical Exam  Constitutional:       General: She is not in acute distress.     Appearance: Normal appearance.   Neck:      Comments: No JVD  Cardiovascular:      Rate and Rhythm: Normal rate and regular rhythm.      Pulses: Normal pulses.      Heart sounds: Normal heart sounds.   Pulmonary:      Effort: Pulmonary effort is normal.      Breath sounds: Normal breath sounds.   Musculoskeletal:      Right lower leg: No edema.      Left lower leg: No edema.   Skin:     General: Skin is warm and dry.   Neurological:      Mental Status: She is alert.            Significant Labs: All pertinent lab results from the last 24 hours have been reviewed. and   Recent Lab Results         08/22/23  1155    08/22/23  1152   08/22/23  0529        Albumin/Globulin Ratio     0.6       Albumin     2.4       Alkaline Phosphatase     83       ALT     51       Anion Gap     9       AST     38       Baso #     0.01       Basophil %     0.2       BILIRUBIN TOTAL     1.5       BUN     11       BUN/CREAT RATIO     29       Calcium     9.4       Chloride     111       CO2     27       Creatinine     0.38       D-Dimer 0.85           Differential Type     Auto       eGFR     119       Eos #     0.21       Eosinophil %     4.6       Ferritin 337           Free T4     3.34       Globulin, Total     3.7       Glucose     93       Hematocrit     33.1       Hemoglobin     10.4       Immature Grans (Abs)     0.01       Immature Granulocytes     0.2       Iron 30           Iron Saturation 19           Lymph #     1.10       Lymph %     24.2       MCH     24.5       MCHC     31.4       MCV     77.9       Mono #     0.47       Mono %     10.3       MPV     11.9       Neutrophils, Abs     2.75       Neutrophils Relative     60.5       nRBC     0.0       NUCLEATED RBC ABSOLUTE     0.00       Platelets     286       Potassium     4.1       PROTEIN TOTAL     6.1       RBC     4.25       RDW     12.8       Sodium     143       T3, Total   193         TIBC 159           TSH     <0.005       WBC     4.55               Significant Imaging: Echocardiogram: Transthoracic echo (TTE) complete (Cupid Only):   Results for orders placed or performed during the hospital encounter of 08/19/23   Echo   Result Value Ref Range    BSA 1.96 m2    LVOT stroke volume 67.03 cm3    LVIDd 4.73 3.5 - 6.0 cm    LV Systolic Volume 53.04 mL    LV Systolic Volume Index 27.1 mL/m2    LVIDs 3.56 2.1 - 4.0 cm    LV Diastolic Volume 103.81 mL    LV Diastolic Volume Index 52.96 mL/m2    IVS 0.96 0.6 - 1.1 cm    LVOT diameter 2.12 cm    LVOT area 3.5 cm2    FS 25 (A) 28 - 44 %    Left Ventricle Relative Wall Thickness 0.38 cm    Posterior Wall 0.91 0.6 - 1.1 cm    LV mass  151.75 g    LV Mass Index 77 g/m2    LVOT peak vivien 1.22 m/s    Left Ventricular Outflow Tract Mean Velocity 0.87 cm/s    Left Ventricular Outflow Tract Mean Gradient 3.29 mmHg    TAPSE 1.99 cm    RA area 12.0 cm2    AV mean gradient 4 mmHg    AV peak gradient 8 mmHg    Ao peak vivien 1.40 m/s    Ao VTI 22.70 cm    LVOT peak VTI 19.00 cm    AV valve area 2.95 cm²    AV Velocity Ratio 0.87     AV index (prosthetic) 0.84     YAMILET by Velocity Ratio 3.07 cm²    Ao root annulus 3.52 cm    IVC diameter 1.32 cm    ZLVIDS 0.25     ZLVIDD -1.71     AORTIC VALVE CUSP SEPERATION 2.25 cm    LA size 2.6 cm    RVDD 2.70 cm    Bains's Biplane MOD Ejection Fraction 55 %    Narrative      Left Ventricle: The left ventricle is normal in size. Ventricular mass   is normal. Normal wall thickness. Normal wall motion. There is normal   systolic function. Biplane (2D) method of discs ejection fraction is 55%.    Right Ventricle: Normal right ventricular cavity size. Systolic   function is normal.    Pulmonary Artery: Pulmonary artery pressure could not be accurately   determined.       Assessment and Plan:     Brief HPI: 56 y/o female with PMHx of intellectual disability, HTN and blindness who presents to [facility] transferred from [facility] due to elevated troponin. Patient is not able to provide a history. History from her caretaker who is present in the room and chart review. Per family, she was brought to the hospital because she has been extremely weak and had a fall yesterday at home that was not witnessed, she was found on the floor alert. He states that at baseline the patient does not voluntarily hold a conversation since she cannot elaborate phrases. Patient can feed herself but needs assistance most of the times. She walks to the bathroom and back to her bedroom on her own and stays most of the time lying in bed. Patient became disabled due to problems during birth. Patient's brother denies history of MI or stroke. Patient has  a caretaker that helps with daily chores and family lives next door.     In the ED vital signs showed /88, , RR 20, SpO2 98% at room air, afebrile. Blood work showed H/H 10.3/31.8, WBC 7.22, , sodium 145 potassium 3.1, BUN/Cr 16/0.75, glucose 168, CRP 8.84, lipid panel no hypercholesterolemia, p-, elevated troponin 686, 926, 698, 543, EKG nsr with HR 93. CT head showing no acute intracranial abnormalities. Chest xray showing no infiltrates or consolidation.         * Elevated troponin  Elevated troponin level and EKG changes suggestive of anterior ischemia  Hx unreliable due to baseline intellectual disability     - Recommend coronary angiography for evaluation of coronary anatomy  - Risks versus benefits versus alternatives management options discussed with the patient's caregiver.  She notes that she makes healthcare decisions for the patient.  She also note that she gives her medications and there would be no concern for non-compliance  - NPO after midnight for [facility] tomorrow     08/22/23:  - LHC yesterday revealed normal coronaries  - Echo revealed normal EF of 55%, no significant WMA and no significant valvular abnormalities  - cardiology will sign off, please call if needed        VTE Risk Mitigation (From admission, onward)         Ordered     enoxaparin injection 40 mg  Every 24 hours         08/19/23 2101     IP VTE LOW RISK PATIENT  Once         08/19/23 2101     Place sequential compression device  Until discontinued         08/19/23 2101                Rika Lemus, MATI  Cardiology  [facility] - [address] [place] [address]

## 2023-08-22 NOTE — PT/OT/SLP EVAL
Physical Therapy Evaluation     Patient Name: Kenia Carias   MRN: [id number]  Recent Surgery: Procedure(s) (LRB):  Left heart cath (Left) 1 Day Post-Op    Recommendations:     Discharge Recommendations: nursing facility, skilled (home with 24hr care/supervision)   Discharge Equipment Recommendations: to be determined by next level of care   Barriers to discharge: Increased level of assist and Ongoing medical treatment    Assessment:     Kenia Carias is a 57 y.o. female admitted with a medical diagnosis of Elevated troponin. She presents with the following impairments/functional limitations: weakness, impaired endurance, impaired functional mobility, gait instability, impaired balance, decreased lower extremity function. Pt is blind and lives home with a live-in home care aid. Pt currently presents with essential tremors in all extremities that she states were not present prior to hospitalization. Unclear if pt is a reliable historian. Pt currently requiring more assistance with mobility than pre-hospitalization so pt would benefit from continued therapy at a facility or with [facility] in her normal environment to maximize safe mobility.     Rehab Prognosis: Good; patient would benefit from acute PT services to address these deficits and reach maximum level of function.    Plan:     During this hospitalization, patient to be seen 5 x/week to address the above listed problems via gait training, therapeutic activities, therapeutic exercises, neuromuscular re-education    Plan of Care Expires: 09/22/23    Subjective     Chief Complaint: elevated troponin  Patient Comments/Goals: agreeable to eval  Pain/Comfort:  Pain Rating 1: 0/10    Social History:  Living Environment: Patient lives with their live in home care aid  in a single story home with number of outside stair(s): 2 and no rails  Prior Level of Function: Prior to admission, patient ambulated household distances using no AD  Equipment Used at Home: none  DME owned (not  currently used): none  Assistance Upon Discharge: family and sitter(s)    Objective:     Communicated with RN prior to session. Patient found HOB elevated with peripheral IV, telemetry upon PT entry to room.    General Precautions: Standard, fall   Orthopedic Precautions: N/A   Braces: N/A    Respiratory Status: Room air    Exams:  Cognition: Patient is oriented to Person and Situation  RLE ROM: WFL  RLE Strength: Deficits: 3-/5  LLE ROM: WFL  LLE Strength: Deficits: 3-/5  Sensation:    -       Intact  Skin Integrity/Edema:     -       Edema: Moderate BLE    Functional Mobility:  Gait belt applied - Yes  Bed Mobility  Supine to Sit: minimum assistance for LE management and trunk management  Sit to Supine: minimum assistance for LE management and trunk management  Transfers  Sit to Stand: minimum assistance and of 2 persons with hand-held assist and with cues for hand placement and foot placement  Gait  Patient ambulated 15ft with hand-held assist and minimum assistance and of 2 persons. Patient demonstrates unsteady gait, decreased step length, wide base of support, decreased weight shift, flexed posture, decreased nevaeh, and inconsistent bilateral foot placement. . All lines remained intact throughout ambulation trail.  Balance  Sitting: stand by assistance  Standing: minimum assistance      Therapeutic Activities and Exercises:   Patient educated on role of acute care PT and PT POC, safety while in hospital including calling nurse for mobility, and call light usage      AM-PAC 6 CLICK MOBILITY  Total Score:17    Patient left HOB elevated with all lines intact, call button in reach, and RN notified.    GOALS:   Multidisciplinary Problems       Physical Therapy Goals          Problem: Physical Therapy    Goal Priority Disciplines Outcome Goal Variances Interventions   Physical Therapy Goal     PT, PT/OT Ongoing, Progressing     Description: Short Term Goals to be met by: 9/5/23    Patient will increase functional  independence with mobility by performin. Supine to sit with Stand by assist  2. Sit to stand transfer with Stand by assist using hand held assist as needed  3. Bed to chair transfer with contact guard assist using hand held assist  4. Gait  x 100 feet with contact guard assist using hand held assist  5. Lower extremity exercise program x30 reps per handout, with assistance as needed  6. Pt to negotiate 2 steps with no rail with CGA and HHA    Long Term Goals to be met by: 23    Pt will regain full independent functional mobility with lowest level of assistive device to return to home situation and prior activities of daily living.                        History:     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        Past Surgical History:   Procedure Laterality Date    EYE SURGERY      HYSTERECTOMY      LEFT HEART CATHETERIZATION Left 2023    Procedure: Left heart cath;  Surgeon: Dm Campo DO;  Location: [facility] CATH LAB;  Service: Cardiology;  Laterality: Left;       Time Tracking:     PT Received On: 23  PT Start Time: 935  PT Stop Time: 951  PT Total Time (min): 16 min     Billable Minutes: Evaluation moderate complexity    2023

## 2023-08-22 NOTE — PLAN OF CARE
Problem: Occupational Therapy  Goal: Occupational Therapy Goal  Description: ST.Pt will perform bathing with ModA with setup at EOB  2.Pt will perform UE dressing with Cristal  3.Pt will perform LE dressing with ModA  4.Pt will transfer bed/chair/bsc with CGA with hand held assist  5.Pt will perform standing task x 2 min with CGA with hand held assist  6.Tolerate 15 min of tx without fatigue.      LTG:   Restore to max I with selfcare and mobility.      Outcome: Ongoing, Progressing

## 2023-08-23 LAB
ANION GAP SERPL CALCULATED.3IONS-SCNC: 7 MMOL/L (ref 7–16)
BASOPHILS # BLD AUTO: 0.02 K/UL (ref 0–0.2)
BASOPHILS NFR BLD AUTO: 0.5 % (ref 0–1)
BUN SERPL-MCNC: 9 MG/DL (ref 7–18)
BUN/CREAT SERPL: 25 (ref 6–20)
CALCIUM SERPL-MCNC: 9 MG/DL (ref 8.5–10.1)
CHLORIDE SERPL-SCNC: 112 MMOL/L (ref 98–107)
CO2 SERPL-SCNC: 27 MMOL/L (ref 21–32)
CREAT SERPL-MCNC: 0.36 MG/DL (ref 0.55–1.02)
DIFFERENTIAL METHOD BLD: ABNORMAL
EGFR (NO RACE VARIABLE) (RUSH/TITUS): 120 ML/MIN/1.73M2
EOSINOPHIL # BLD AUTO: 0.17 K/UL (ref 0–0.5)
EOSINOPHIL NFR BLD AUTO: 4 % (ref 1–4)
ERYTHROCYTE [DISTWIDTH] IN BLOOD BY AUTOMATED COUNT: 12.7 % (ref 11.5–14.5)
GLUCOSE SERPL-MCNC: 96 MG/DL (ref 74–106)
HCT VFR BLD AUTO: 30.7 % (ref 38–47)
HGB BLD-MCNC: 9.9 G/DL (ref 12–16)
IMM GRANULOCYTES # BLD AUTO: 0.01 K/UL (ref 0–0.04)
IMM GRANULOCYTES NFR BLD: 0.2 % (ref 0–0.4)
LYMPHOCYTES # BLD AUTO: 1.32 K/UL (ref 1–4.8)
LYMPHOCYTES NFR BLD AUTO: 31.4 % (ref 27–41)
MCH RBC QN AUTO: 24.4 PG (ref 27–31)
MCHC RBC AUTO-ENTMCNC: 32.2 G/DL (ref 32–36)
MCV RBC AUTO: 75.8 FL (ref 80–96)
MONOCYTES # BLD AUTO: 0.44 K/UL (ref 0–0.8)
MONOCYTES NFR BLD AUTO: 10.5 % (ref 2–6)
MPC BLD CALC-MCNC: 11.2 FL (ref 9.4–12.4)
NEUTROPHILS # BLD AUTO: 2.24 K/UL (ref 1.8–7.7)
NEUTROPHILS NFR BLD AUTO: 53.4 % (ref 53–65)
NRBC # BLD AUTO: 0 X10E3/UL
NRBC, AUTO (.00): 0 %
PLATELET # BLD AUTO: 257 K/UL (ref 150–400)
POTASSIUM SERPL-SCNC: 3.7 MMOL/L (ref 3.5–5.1)
RBC # BLD AUTO: 4.05 M/UL (ref 4.2–5.4)
SODIUM SERPL-SCNC: 142 MMOL/L (ref 136–145)
TROPONIN I SERPL DL<=0.01 NG/ML-MCNC: 283.2 PG/ML
WBC # BLD AUTO: 4.2 K/UL (ref 4.5–11)

## 2023-08-23 PROCEDURE — 25000003 PHARM REV CODE 250

## 2023-08-23 PROCEDURE — 80048 BASIC METABOLIC PNL TOTAL CA: CPT

## 2023-08-23 PROCEDURE — 84484 ASSAY OF TROPONIN QUANT: CPT

## 2023-08-23 PROCEDURE — 25000003 PHARM REV CODE 250: Performed by: NURSE PRACTITIONER

## 2023-08-23 PROCEDURE — 25000003 PHARM REV CODE 250: Performed by: GENERAL PRACTICE

## 2023-08-23 PROCEDURE — 99232 SBSQ HOSP IP/OBS MODERATE 35: CPT | Mod: GC,,, | Performed by: FAMILY MEDICINE

## 2023-08-23 PROCEDURE — 97110 THERAPEUTIC EXERCISES: CPT

## 2023-08-23 PROCEDURE — 63600175 PHARM REV CODE 636 W HCPCS: Performed by: GENERAL PRACTICE

## 2023-08-23 PROCEDURE — 11000001 HC ACUTE MED/SURG PRIVATE ROOM

## 2023-08-23 PROCEDURE — 99232 PR SUBSEQUENT HOSPITAL CARE,LEVL II: ICD-10-PCS | Mod: GC,,, | Performed by: FAMILY MEDICINE

## 2023-08-23 PROCEDURE — 85025 COMPLETE CBC W/AUTO DIFF WBC: CPT

## 2023-08-23 PROCEDURE — 97116 GAIT TRAINING THERAPY: CPT

## 2023-08-23 RX ORDER — HYDRALAZINE HYDROCHLORIDE 20 MG/ML
10 INJECTION INTRAMUSCULAR; INTRAVENOUS EVERY 6 HOURS PRN
Status: DISCONTINUED | OUTPATIENT
Start: 2023-08-23 | End: 2023-08-25 | Stop reason: HOSPADM

## 2023-08-23 RX ADMIN — CLONAZEPAM 1 MG: 0.5 TABLET ORAL at 09:08

## 2023-08-23 RX ADMIN — METOPROLOL SUCCINATE 50 MG: 50 TABLET, EXTENDED RELEASE ORAL at 09:08

## 2023-08-23 RX ADMIN — ENOXAPARIN SODIUM 40 MG: 100 INJECTION SUBCUTANEOUS at 05:08

## 2023-08-23 RX ADMIN — PAROXETINE 20 MG: 20 TABLET, FILM COATED ORAL at 09:08

## 2023-08-23 RX ADMIN — LOSARTAN POTASSIUM 25 MG: 25 TABLET, FILM COATED ORAL at 09:08

## 2023-08-23 NOTE — SUBJECTIVE & OBJECTIVE
Interval History: Patient seen and examined at bedside. Reports feeling better today. No acute events overnight. CTA showed no signs of PE. US thyroid showed enlarged with increase vascular suggestive of thyroiditis. Ordered GRACIE panel.  Consider to follow-up with endocrinology outpatient. Plan to d/c tomorrow pending CTA. Per PT, patient is not ready to be discharged without assistance. Per sister-in-law, agrees with possible SWB. SS has been consulted.     Review of Systems   Constitutional:  Negative for activity change, appetite change, fatigue and fever.   Respiratory:  Negative for cough, chest tightness and shortness of breath.    Cardiovascular:  Negative for chest pain.   Gastrointestinal:  Negative for abdominal distention, abdominal pain, constipation, diarrhea, nausea and vomiting.   Genitourinary:  Negative for difficulty urinating.   Neurological:  Positive for tremors. Negative for dizziness, light-headedness and headaches.     Objective:     Vital Signs (Most Recent):  Temp: 98.4 °F (36.9 °C) (08/23/23 1100)  Pulse: 91 (08/23/23 1100)  Resp: 18 (08/23/23 1100)  BP: (!) 178/98 (08/23/23 1100)  SpO2: 98 % (08/23/23 1100) Vital Signs (24h Range):  Temp:  [97.8 °F (36.6 °C)-98.7 °F (37.1 °C)] 98.4 °F (36.9 °C)  Pulse:  [81-95] 91  Resp:  [18] 18  SpO2:  [97 %-100 %] 98 %  BP: (118-178)/(76-98) 178/98     Weight: 79.1 kg (174 lb 6.1 oz)  Body mass index is 25.02 kg/m².  No intake or output data in the 24 hours ending 08/23/23 1607      Physical Exam  Vitals and nursing note reviewed.   Constitutional:       General: She is not in acute distress.     Appearance: Normal appearance. She is not ill-appearing.   HENT:      Head: Normocephalic and atraumatic.   Cardiovascular:      Rate and Rhythm: Normal rate.      Heart sounds: No murmur heard.     No friction rub. No gallop.   Pulmonary:      Effort: Pulmonary effort is normal. No respiratory distress.      Breath sounds: Normal breath sounds. No wheezing.    Chest:      Chest wall: No tenderness.   Abdominal:      General: Bowel sounds are normal. There is no distension.      Tenderness: There is no abdominal tenderness.   Musculoskeletal:         General: No swelling.      Right lower leg: No edema.      Left lower leg: No edema.   Skin:     Coloration: Skin is not jaundiced.      Findings: No bruising, erythema or rash.   Neurological:      Mental Status: She is alert and oriented to person, place, and time.             Significant Labs: All pertinent labs within the past 24 hours have been reviewed.    Significant Imaging: I have reviewed all pertinent imaging results/findings within the past 24 hours.

## 2023-08-23 NOTE — PROGRESS NOTES
[facility] - [facility] Tele[facility] Hospital Medicine  Progress Note    Patient Name: Kenia Carias  MRN: [id number]  Patient Class: IP- Inpatient   Admission Date: 8/19/2023  Length of Stay: 3 days  Attending Physician: Adrian Swenson Jr., MD  Primary Care Provider: Elaine Clark MD        Subjective:     Principal Problem:Elevated troponin        HPI:  Patient is a 56 y/o female with PMHx of HTN, mental disability and blindness who presents to [facility] transferred from [facility] due to elevated troponin. Patient is not able to provide a history. History was taken from her brother present in the room. Patient's brother states that he took the patient to the hospital because she has been extreme weak and had a fall yesterday at home that was not witnessed, she was found on the floor alert. Patient's brother cannot precise how long patient was on the floor. He states that at baseline the patient does not voluntarily hold a conversation since she cannot elaborate phrases. Patient can feed herself but needs assistance most of the times. She walks to the bathroom and back to her bedroom on her own and stays most of the time lying in bed. Patient became disabled due to problems during birth. Patient's brother denies history of MI or stroke. Patient has a caretaker that helps with daily chores and family lives next door.    In the ED vital signs showed /88, , RR 20, SpO2 98% at room air, afebrile. Blood work showed H/H 10.3/31.8, WBC 7.22, , sodium 145 potassium 3.1, BUN/Cr 16/0.75, glucose 168, CRP 8.84, lipid panel no hypercholesterolemia, p-, elevated troponin 686, 926, 698, 543, EKG nsr with HR 93. CT head showing no acute intracranial abnormalities. Chest xray showing no infiltrates or consolidation. Patient will be admitted to the hospital for further management.      Overview/Hospital Course:  No notes on file    Interval History: Patient seen and examined at  bedside. Reports feeling better today. No acute events overnight. Planned to discharge patient today. D-dimer mildly elevated at 0.85. Ordered CTA; pending results. TSH<0.005 ; T4 3.34 patient has hyperthyroidism. Consider to follow-up with endocrinology outpatient. Plan to d/c tomorrow pending CTA.     Review of Systems   Constitutional:  Negative for activity change and appetite change.   Respiratory:  Negative for chest tightness, shortness of breath and wheezing.    Cardiovascular:  Negative for chest pain.   Gastrointestinal:  Negative for abdominal distention, abdominal pain, diarrhea, nausea and vomiting.   Neurological:  Positive for tremors. Negative for dizziness, weakness, light-headedness and headaches.     Objective:     Vital Signs (Most Recent):  Temp: 98.5 °F (36.9 °C) (08/22/23 1602)  Pulse: 89 (08/22/23 1602)  Resp: 18 (08/22/23 1602)  BP: 125/74 (08/22/23 1602)  SpO2: 98 % (08/22/23 1946) Vital Signs (24h Range):  Temp:  [97.9 °F (36.6 °C)-99.4 °F (37.4 °C)] 98.5 °F (36.9 °C)  Pulse:  [83-99] 89  Resp:  [16-20] 18  SpO2:  [96 %-100 %] 98 %  BP: (112-152)/(70-90) 125/74     Weight: 79.1 kg (174 lb 6.1 oz)  Body mass index is 25.02 kg/m².  No intake or output data in the 24 hours ending 08/22/23 1947      Physical Exam  Vitals and nursing note reviewed.   Constitutional:       General: She is not in acute distress.     Appearance: Normal appearance. She is not ill-appearing.   HENT:      Head: Normocephalic and atraumatic.   Eyes:      Comments: Blind in both eyes    Cardiovascular:      Rate and Rhythm: Normal rate.      Heart sounds: No murmur heard.     No friction rub. No gallop.   Pulmonary:      Effort: No respiratory distress.      Breath sounds: No wheezing or rales.   Chest:      Chest wall: No tenderness.   Abdominal:      General: Bowel sounds are normal. There is no distension.      Palpations: Abdomen is soft.      Tenderness: There is no abdominal tenderness.   Musculoskeletal:       Cervical back: Normal range of motion.      Right lower leg: No edema.      Left lower leg: No edema.   Skin:     Findings: No lesion or rash.   Neurological:      Mental Status: She is alert and oriented to person, place, and time.   Psychiatric:         Mood and Affect: Mood normal.         Behavior: Behavior normal.             Significant Labs: All pertinent labs within the past 24 hours have been reviewed.    Significant Imaging: I have reviewed all pertinent imaging results/findings within the past 24 hours.      Assessment/Plan:      * Elevated troponin  08/21  - Trops 442  -[facility] showed no blockage       08/20  - Repeated trops; pending results         Mental disability. Patient denies active chest pain  hemodynamically stable  EKG sinus tachycardia   Elevated troponin 686, 926, 698, 543  Consult cardiology in the morning  Monitor vital signs an physical exam      Mental disability  Per family patient has been disable since birth. Family unclear on the cause.  Per family at baseline patient can feed herself but needs assistance with feeding most of the time. She walks to the bathroom and back to her bedroom on her own and stays most of the time lying in bed. She has a caretaker who is planning on coming to stay with her in the hospital tomorrow.    Hypotension  Continue home Losartan 25mg QD and Metoprolol 25mg BID  Monitor BP    Leukocytosis  08/20  -Resolved         Tremor of unknown origin  08/20  - Left hand tremor        VTE Risk Mitigation (From admission, onward)         Ordered     enoxaparin injection 40 mg  Every 24 hours         08/19/23 2101     IP VTE LOW RISK PATIENT  Once         08/19/23 2101     Place sequential compression device  Until discontinued         08/19/23 2101                Discharge Planning   PAWEL:      Code Status: Full Code   Is the patient medically ready for discharge?:     Reason for patient still in hospital (select all that apply): Treatment  Discharge Plan A: Home  with family                  Boris Lemons MD  Department of Hospital Medicine   [facility] - [address] [place] [address]

## 2023-08-23 NOTE — ASSESSMENT & PLAN NOTE
08/23  - Per PT, patient is not ready to be discharge without more PT  -Planning to d/c to [facility]; Sister-in-law request  -Will continue to follow

## 2023-08-23 NOTE — SUBJECTIVE & OBJECTIVE
Interval History: Patient seen and examined at bedside. Reports feeling better today. No acute events overnight. Planned to discharge patient today. D-dimer mildly elevated at 0.85. Ordered CTA; pending results. TSH<0.005 ; T4 3.34 patient has hyperthyroidism. Consider to follow-up with endocrinology outpatient. Plan to d/c tomorrow pending CTA.     Review of Systems   Constitutional:  Negative for activity change and appetite change.   Respiratory:  Negative for chest tightness, shortness of breath and wheezing.    Cardiovascular:  Negative for chest pain.   Gastrointestinal:  Negative for abdominal distention, abdominal pain, diarrhea, nausea and vomiting.   Neurological:  Positive for tremors. Negative for dizziness, weakness, light-headedness and headaches.     Objective:     Vital Signs (Most Recent):  Temp: 98.5 °F (36.9 °C) (08/22/23 1602)  Pulse: 89 (08/22/23 1602)  Resp: 18 (08/22/23 1602)  BP: 125/74 (08/22/23 1602)  SpO2: 98 % (08/22/23 1946) Vital Signs (24h Range):  Temp:  [97.9 °F (36.6 °C)-99.4 °F (37.4 °C)] 98.5 °F (36.9 °C)  Pulse:  [83-99] 89  Resp:  [16-20] 18  SpO2:  [96 %-100 %] 98 %  BP: (112-152)/(70-90) 125/74     Weight: 79.1 kg (174 lb 6.1 oz)  Body mass index is 25.02 kg/m².  No intake or output data in the 24 hours ending 08/22/23 1947      Physical Exam  Vitals and nursing note reviewed.   Constitutional:       General: She is not in acute distress.     Appearance: Normal appearance. She is not ill-appearing.   HENT:      Head: Normocephalic and atraumatic.   Eyes:      Comments: Blind in both eyes    Cardiovascular:      Rate and Rhythm: Normal rate.      Heart sounds: No murmur heard.     No friction rub. No gallop.   Pulmonary:      Effort: No respiratory distress.      Breath sounds: No wheezing or rales.   Chest:      Chest wall: No tenderness.   Abdominal:      General: Bowel sounds are normal. There is no distension.      Palpations: Abdomen is soft.      Tenderness: There is no  abdominal tenderness.   Musculoskeletal:      Cervical back: Normal range of motion.      Right lower leg: No edema.      Left lower leg: No edema.   Skin:     Findings: No lesion or rash.   Neurological:      Mental Status: She is alert and oriented to person, place, and time.   Psychiatric:         Mood and Affect: Mood normal.         Behavior: Behavior normal.             Significant Labs: All pertinent labs within the past 24 hours have been reviewed.    Significant Imaging: I have reviewed all pertinent imaging results/findings within the past 24 hours.

## 2023-08-23 NOTE — PROGRESS NOTES
[facility] - [facility] Tele[facility] Hospital Medicine  Progress Note    Patient Name: Kenia Carias  MRN: [id number]  Patient Class: IP- Inpatient   Admission Date: 8/19/2023  Length of Stay: 4 days  Attending Physician: Adrian Swenson Jr., MD  Primary Care Provider: Elaine Clark MD        Subjective:     Principal Problem:Elevated troponin        HPI:  Patient is a 56 y/o female with PMHx of HTN, mental disability and blindness who presents to [facility] transferred from [facility] due to elevated troponin. Patient is not able to provide a history. History was taken from her brother present in the room. Patient's brother states that he took the patient to the hospital because she has been extreme weak and had a fall yesterday at home that was not witnessed, she was found on the floor alert. Patient's brother cannot precise how long patient was on the floor. He states that at baseline the patient does not voluntarily hold a conversation since she cannot elaborate phrases. Patient can feed herself but needs assistance most of the times. She walks to the bathroom and back to her bedroom on her own and stays most of the time lying in bed. Patient became disabled due to problems during birth. Patient's brother denies history of MI or stroke. Patient has a caretaker that helps with daily chores and family lives next door.    In the ED vital signs showed /88, , RR 20, SpO2 98% at room air, afebrile. Blood work showed H/H 10.3/31.8, WBC 7.22, , sodium 145 potassium 3.1, BUN/Cr 16/0.75, glucose 168, CRP 8.84, lipid panel no hypercholesterolemia, p-, elevated troponin 686, 926, 698, 543, EKG nsr with HR 93. CT head showing no acute intracranial abnormalities. Chest xray showing no infiltrates or consolidation. Patient will be admitted to the hospital for further management.      Overview/Hospital Course:  No notes on file    Interval History: Patient seen and examined at  bedside. Reports feeling better today. No acute events overnight. CTA showed no signs of PE. US thyroid showed enlarged with increase vascular suggestive of thyroiditis. Ordered GRACIE panel.  Consider to follow-up with endocrinology outpatient. Plan to d/c tomorrow pending CTA. Per PT, patient is not ready to be discharged without assistance. Per sister-in-law, agrees with possible SWB. SS has been consulted.     Review of Systems   Constitutional:  Negative for activity change, appetite change, fatigue and fever.   Respiratory:  Negative for cough, chest tightness and shortness of breath.    Cardiovascular:  Negative for chest pain.   Gastrointestinal:  Negative for abdominal distention, abdominal pain, constipation, diarrhea, nausea and vomiting.   Genitourinary:  Negative for difficulty urinating.   Neurological:  Positive for tremors. Negative for dizziness, light-headedness and headaches.     Objective:     Vital Signs (Most Recent):  Temp: 98.4 °F (36.9 °C) (08/23/23 1100)  Pulse: 91 (08/23/23 1100)  Resp: 18 (08/23/23 1100)  BP: (!) 178/98 (08/23/23 1100)  SpO2: 98 % (08/23/23 1100) Vital Signs (24h Range):  Temp:  [97.8 °F (36.6 °C)-98.7 °F (37.1 °C)] 98.4 °F (36.9 °C)  Pulse:  [81-95] 91  Resp:  [18] 18  SpO2:  [97 %-100 %] 98 %  BP: (118-178)/(76-98) 178/98     Weight: 79.1 kg (174 lb 6.1 oz)  Body mass index is 25.02 kg/m².  No intake or output data in the 24 hours ending 08/23/23 1607      Physical Exam  Vitals and nursing note reviewed.   Constitutional:       General: She is not in acute distress.     Appearance: Normal appearance. She is not ill-appearing.   HENT:      Head: Normocephalic and atraumatic.   Cardiovascular:      Rate and Rhythm: Normal rate.      Heart sounds: No murmur heard.     No friction rub. No gallop.   Pulmonary:      Effort: Pulmonary effort is normal. No respiratory distress.      Breath sounds: Normal breath sounds. No wheezing.   Chest:      Chest wall: No tenderness.    Abdominal:      General: Bowel sounds are normal. There is no distension.      Tenderness: There is no abdominal tenderness.   Musculoskeletal:         General: No swelling.      Right lower leg: No edema.      Left lower leg: No edema.   Skin:     Coloration: Skin is not jaundiced.      Findings: No bruising, erythema or rash.   Neurological:      Mental Status: She is alert and oriented to person, place, and time.             Significant Labs: All pertinent labs within the past 24 hours have been reviewed.    Significant Imaging: I have reviewed all pertinent imaging results/findings within the past 24 hours.      Assessment/Plan:      * Elevated troponin  08/23  - Trending down 283  - Patient asymptomatic; No concerns to consult cardiology at this time      08/21  - Trops 442  -[facility] showed no blockage       08/20  - Repeated trops; pending results         Mental disability. Patient denies active chest pain  hemodynamically stable  EKG sinus tachycardia   Elevated troponin 686, 926, 698, 543  Consult cardiology in the morning  Monitor vital signs an physical exam      Mental disability  Per family patient has been disable since birth. Family unclear on the cause.  Per family at baseline patient can feed herself but needs assistance with feeding most of the time. She walks to the bathroom and back to her bedroom on her own and stays most of the time lying in bed. She has a caretaker who is planning on coming to stay with her in the hospital tomorrow.    Hypotension  Continue home Losartan 25mg QD and Metoprolol 25mg BID  Monitor BP    Leukocytosis  08/20  -Resolved         Tremor of unknown origin  08/20  - Left hand tremor      Generalized weakness  08/23  - Per PT, patient is not ready to be discharge without more PT  -Planning to d/c to [facility]; Sister-in-law request  -Will continue to follow          VTE Risk Mitigation (From admission, onward)         Ordered     enoxaparin injection 40 mg  Every 24 hours          08/19/23 2101     IP VTE LOW RISK PATIENT  Once         08/19/23 2101     Place sequential compression device  Until discontinued         08/19/23 2101                Discharge Planning   PAWEL: 8/24/2023     Code Status: Full Code   Is the patient medically ready for discharge?:     Reason for patient still in hospital (select all that apply): Treatment  Discharge Plan A: Home with family                  Boris Lemons MD  Department of Hospital Medicine   [facility] - [facility] Medical Tele[facility]

## 2023-08-23 NOTE — PT/OT/SLP PROGRESS
Physical Therapy Treatment    Patient Name:  Kenia Carias   MRN:  [id number]    Recommendations:     Discharge Recommendations: nursing facility, skilled (home with 24hr care/supervision)  Discharge Equipment Recommendations: to be determined by next level of care  Barriers to discharge:  ongoing medical care    Assessment:     Kenia Carias is a 57 y.o. female admitted with a medical diagnosis of Elevated troponin.  She presents with the following impairments/functional limitations: weakness, impaired endurance, impaired functional mobility, gait instability, impaired balance, decreased lower extremity function. Pt demo increased gait distance this session but low endurance. Pt attempted to sit before returning to bed d/t fatigue. Pt still demo tremors throughout all extremities, seemed to progress with fatigue. Pt would benefit from continued PT.    Rehab Prognosis: Good and Fair; patient would benefit from acute skilled PT services to address these deficits and reach maximum level of function.    Recent Surgery: Procedure(s) (LRB):  Left heart cath (Left) 2 Days Post-Op    Plan:     During this hospitalization, patient to be seen 5 x/week to address the identified rehab impairments via gait training, therapeutic activities, therapeutic exercises, neuromuscular re-education and progress toward the following goals:    Plan of Care Expires:  09/22/23    Subjective     Chief Complaint: elevated troponin  Patient/Family Comments/goals: agreeable   Pain/Comfort: 0/10         Objective:     Communicated with KRIS Remy RN prior to session.  Patient found HOB elevated with peripheral IV, telemetry upon PT entry to room.     General Precautions: Standard, fall  Orthopedic Precautions: N/A  Braces: N/A  Respiratory Status: Room air     Functional Mobility:  Bed Mobility:     Scooting: contact guard assistance and minimum assistance  Supine to Sit: contact guard assistance  Sit to Supine: minimum assistance  Transfers:     Sit  to Stand:  contact guard assistance and minimum assistance with hand-held assist  Gait: 45ft-50ft with bilat HHA with heavy support on L side, Min to Mod A x 2, short stride, foot flat contact, unsteady  Balance: Fair-      AM-PAC 6 CLICK MOBILITY  Turning over in bed (including adjusting bedclothes, sheets and blankets)?: 3  Sitting down on and standing up from a chair with arms (e.g., wheelchair, bedside commode, etc.): 3  Moving from lying on back to sitting on the side of the bed?: 3  Moving to and from a bed to a chair (including a wheelchair)?: 3  Need to walk in hospital room?: 2  Climbing 3-5 steps with a railing?: 2  Basic Mobility Total Score: 16       Treatment & Education:  Mobility as stated above.     Patient left supine with all lines intact, call button in reach, and CNA present..    GOALS:   Multidisciplinary Problems       Physical Therapy Goals          Problem: Physical Therapy    Goal Priority Disciplines Outcome Goal Variances Interventions   Physical Therapy Goal     PT, PT/OT Ongoing, Progressing     Description: Short Term Goals to be met by: 23    Patient will increase functional independence with mobility by performin. Supine to sit with Stand by assist  2. Sit to stand transfer with Stand by assist using hand held assist as needed  3. Bed to chair transfer with contact guard assist using hand held assist  4. Gait  x 100 feet with contact guard assist using hand held assist  5. Lower extremity exercise program x30 reps per handout, with assistance as needed  6. Pt to negotiate 2 steps with no rail with CGA and HHA    Long Term Goals to be met by: 23    Pt will regain full independent functional mobility with lowest level of assistive device to return to home situation and prior activities of daily living.                        Time Tracking:     PT Received On: 23  PT Start Time: 1330     PT Stop Time: 1346  PT Total Time (min): 16 min     Billable Minutes: Gait  Training 12    Treatment Type: Evaluation  PT/PTA: PT           08/23/2023

## 2023-08-23 NOTE — PT/OT/SLP PROGRESS
Occupational Therapy   Treatment    Name: Kenia Carias  MRN: [id number]  Admitting Diagnosis:  Elevated troponin  2 Days Post-Op    Recommendations:     Discharge Recommendations: nursing facility, skilled  Discharge Equipment Recommendations:  other (see comments) (to be determined)  Barriers to discharge:  Other (Comment) (ongoing medical treatment)    Assessment:     Kenia Carias is a 57 y.o. female with a medical diagnosis of Elevated troponin.  She presents with weakness and decline in ADLs. Performance deficits affecting function are weakness, impaired endurance, impaired self care skills, impaired functional mobility, gait instability, impaired balance, decreased safety awareness. Pt continues to demonstrate LUE tremor. Pt would benefit from swing bed at discharge d/t decreased safety and increased assist needed with performing functional mobility and ADL tasks.     Rehab Prognosis:  Good; patient would benefit from acute skilled OT services to address these deficits and reach maximum level of function.       Plan:     Patient to be seen 5 x/week to address the above listed problems via self-care/home management, therapeutic activities, therapeutic exercises  Plan of Care Expires: 09/19/23  Plan of Care Reviewed with: patient    Subjective     Chief Complaint: weakness  Patient/Family Comments/goals: pt agreeable to OT tx  Pain/Comfort:  Pain Rating 1: 0/10    Objective:     Communicated with: HORTENCIA Childress prior to session.  Patient found supine with bed alarm, peripheral IV, telemetry upon OT entry to room.    General Precautions: Standard, fall    Orthopedic Precautions:N/A  Braces: N/A  Respiratory Status: Room air     Occupational Performance:     Bed Mobility:    Not performed     Functional Mobility/Transfers:  Not performed    Activities of Daily Living:  Not performed      [facility] 6 Click ADL:      Treatment & Education:  Pt performed x 15 reps each bicep curls 2#db, chest press 2#db, IR/ER 2#db, rows green  tband, and tricep press green tband in order to improve BUE strength and endurance to perform ADL and ADL t/f with less assist.     Patient left supine with all lines intact, call button in reach, bed alarm on, and telesitter present    GOALS:   Multidisciplinary Problems       Occupational Therapy Goals          Problem: Occupational Therapy    Goal Priority Disciplines Outcome Interventions   Occupational Therapy Goal     OT, PT/OT Ongoing, Progressing    Description: ST.Pt will perform bathing with ModA with setup at EOB  2.Pt will perform UE dressing with Cristal  3.Pt will perform LE dressing with ModA  4.Pt will transfer bed/chair/bsc with CGA with hand held assist  5.Pt will perform standing task x 2 min with CGA with hand held assist  6.Tolerate 15 min of tx without fatigue.      LTG:   Restore to max I with selfcare and mobility.                           Time Tracking:     OT Date of Treatment: 23  OT Start Time: 1346  OT Stop Time: 1401  OT Total Time (min): 15 min    Billable Minutes:Therapeutic Exercise 15 minutes    OT/CHELITA: OT          2023

## 2023-08-23 NOTE — PLAN OF CARE
Consult acknowledged for qing, marcio f/u with pt's family. Family agreeable with dc to swingbed facility. Choice obtained for [facility] swingbed. Sw called referral in at this time and notified facility that pt will be ready for dc on tomorrow 08/24/23. Ss following.    1542 sw received call back from [facility] Swing[facility] admissions person. Facility unable to accept pt for admission due to staffing and bed availability. Marcio f/u with pt's family. Additional choice obtained for [facility]. Referral being called in at this time.

## 2023-08-23 NOTE — ASSESSMENT & PLAN NOTE
08/23  - Trending down 283  - Patient asymptomatic; No concerns to consult cardiology at this time      08/21  - Trops 442  -[facility] showed no blockage       08/20  - Repeated trops; pending results         Mental disability. Patient denies active chest pain  hemodynamically stable  EKG sinus tachycardia   Elevated troponin 686, 926, 698, 543  Consult cardiology in the morning  Monitor vital signs an physical exam

## 2023-08-23 NOTE — PROGRESS NOTES
[facility] - Cath Lab  Adult Nutrition  First Assessment Note         Reason for Assessment  Reason For Assessment: RD follow-up   Nutrition Risk Screen: no indicators present    Assessment and Plan  RD follow up. Possible dc today pending CTA per MD. She continues on a cardiac diet. RD following.    She was admitted on 8/19 for elevated troponin.   Patient is 174 pounds with a BMI of 25.02 and is overweight. She is ordered a cardiac diet. Recommend continue current diet as able/appropriate and tolerated. Encourage good po intakes.    Last BM 8/19 per flowsheet.    Medications/labs reviewed. RD following.    Skin Integrity  Krishan Risk Assessment  Sensory Perception: 4-->no impairment  Moisture: 4-->rarely moist  Activity: 3-->walks occasionally  Mobility: 2-->very limited  Nutrition: 3-->adequate  Friction and Shear: 2-->potential problem  Krishan Score: 18      Malnutrition  no      Nutrition Diagnosis    No Nutritional Diagnosis at this time      Interventions/Recommendations (treatment strategy):  Recommend continue current diet as able/appropriate and tolerated. Encourage good po intakes.    Nutrition Diagnosis Status:   Progressing to goal    Nutrition Risk  Level of Risk/Frequency of Follow-up: low    Recent Labs   Lab 08/23/23  0457   GLU 96         Nutrition Prescription / Recommendations  Recommendation/Intervention: Recommend continue current diet as able/appropriate and tolerated. Encourage good po intakes.  Goals: Weight maintenance, intake % of meals  Nutrition Goal Status: progressing towards goal  Current Diet Order: cardiac diet  Chewing or Swallowing Difficulty?: Poor Dentition  Recommended Diet: Heart Healthy  Recommended Oral Supplement: No Oral Supplements  Is Nutrition Support Recommended: No   Is Education Recommended: No  Monitor and Evaluation  % current Intake: New Diet order with no P.O. intake documented currently  % intake to meet estimated needs: 75 - 100 %  Food and  "Nutrient Intake: energy intake  Food and Nutrient Adminstration: diet order  Anthropometric Measurements: weight, weight change  Biochemical Data, Medical Tests and Procedures: electrolyte and renal panel, gastrointestinal profile, glucose/endocrine profile, inflammatory profile, lipid profile  Nutrition-Focused Physical Findings: overall appearance     Current Medical Diagnosis and Past Medical History  Diagnosis: cardiac disease  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      Nutrition/Diet History  Spiritual, Cultural Beliefs, Quaker Practices, Values that Affect Care: no  Lab/Procedures/Meds  Recent Labs   Lab 08/22/23  0529 08/23/23  0457    142   K 4.1 3.7   BUN 11 9   CREATININE 0.38* 0.36*   CALCIUM 9.4 9.0   ALBUMIN 2.4*  --    * 112*   ALT 51  --    AST 38*  --      Note: Cr low, Cl- elevated, Alb low. Will monitor.     Last A1c:   Lab Results   Component Value Date    HGBA1C 5.0 08/19/2023     Lab Results   Component Value Date    RBC 4.05 (L) 08/23/2023    HGB 9.9 (L) 08/23/2023    HCT 30.7 (L) 08/23/2023    MCV 75.8 (L) 08/23/2023    MCH 24.4 (L) 08/23/2023    MCHC 32.2 08/23/2023    TIBC 159 (L) 08/22/2023     Pertinent Labs Reviewed: reviewed  Pertinent Labs Comments: Sodium: 143  Potassium: 3.6  Chloride: 113 (H)  CO2: 24  Anion Gap: 10  BUN: 12  Creatinine: 0.41 (L)  BUN/CREAT RATIO: 29 (H)  eGFR: 116  Glucose: 97  Calcium: 9.2  Alkaline Phosphatase: 78  PROTEIN TOTAL: 6.0 (L)  Albumin: 2.5 (L)  Albumin/Globulin Ratio: 0.7  BILIRUBIN TOTAL: 1.7 (H)  AST: 47 (H)  ALT: 55  Globulin, Total: 3.5  Pertinent Medications Reviewed: reviewed  Pertinent Medications Comments: ASA, atorvastatin, clonazepam, enoxaparin, losartan, metoprolol, paroxetine  Anthropometrics  Temp: 97.8 °F (36.6 °C)  Height: 5' 10" (177.8 cm)  Height (inches): 70 " in  Weight Method: Bed Scale  Weight: 79.1 kg (174 lb 6.1 oz)  Weight (lb): 174.39 lb  Ideal Body Weight (IBW), Female: 150 lb  % Ideal Body Weight, Female (lb): 114.67 %  BMI (Calculated): 25     Estimated/Assessed Needs  RMR (Sutton-St. Jeor Equation): 1466.25     Temp: 97.8 °F (36.6 °C)Oral  Weight Used For Calorie Calculations: 78.9 kg (174 lb)     Energy Calorie Requirements (kcal): 1973-2367kcal (25-30kcal/kg)  Weight Used For Protein Calculations: 78.9 kg (174 lb)  Protein Requirements: 63-79g (0.8-1.0g/kg)       RDA Method (mL): 1973     Nutrition by Nursing  Diet/Nutrition Received: NPO, sips of water           Last Bowel Movement: 08/19/23                Nutrition Follow-Up  RD Follow-up?: Yes

## 2023-08-24 PROBLEM — I95.9 HYPOTENSION: Status: RESOLVED | Noted: 2023-08-19 | Resolved: 2023-08-24

## 2023-08-24 PROBLEM — R79.89 ELEVATED TROPONIN: Status: RESOLVED | Noted: 2023-08-19 | Resolved: 2023-08-24

## 2023-08-24 PROBLEM — D72.829 LEUKOCYTOSIS: Status: RESOLVED | Noted: 2023-08-19 | Resolved: 2023-08-24

## 2023-08-24 LAB
ANA SER QL: POSITIVE
ANION GAP SERPL CALCULATED.3IONS-SCNC: 8 MMOL/L (ref 7–16)
BASOPHILS # BLD AUTO: 0 K/UL (ref 0–0.2)
BASOPHILS NFR BLD AUTO: 0 % (ref 0–1)
BUN SERPL-MCNC: 13 MG/DL (ref 7–18)
BUN/CREAT SERPL: 33 (ref 6–20)
CALCIUM SERPL-MCNC: 8.9 MG/DL (ref 8.5–10.1)
CHLORIDE SERPL-SCNC: 112 MMOL/L (ref 98–107)
CO2 SERPL-SCNC: 28 MMOL/L (ref 21–32)
CREAT SERPL-MCNC: 0.4 MG/DL (ref 0.55–1.02)
DIFFERENTIAL METHOD BLD: ABNORMAL
DSDNA IGG SERPL IA-ACNC: 24 IU (ref 0–24)
DSDNA IGG SERPL IA-ACNC: NEGATIVE [IU]/ML
EGFR (NO RACE VARIABLE) (RUSH/TITUS): 117 ML/MIN/1.73M2
ENA AB SER QL IA: 3.4 EUS (ref 0–19.9)
ENA AB SER QL IA: NEGATIVE
EOSINOPHIL # BLD AUTO: 0.24 K/UL (ref 0–0.5)
EOSINOPHIL NFR BLD AUTO: 5.2 % (ref 1–4)
ERYTHROCYTE [DISTWIDTH] IN BLOOD BY AUTOMATED COUNT: 12.9 % (ref 11.5–14.5)
ERYTHROCYTE [SEDIMENTATION RATE] IN BLOOD BY WESTERGREN METHOD: 20 MM/HR (ref 0–30)
GLUCOSE SERPL-MCNC: 99 MG/DL (ref 74–106)
HCT VFR BLD AUTO: 30.1 % (ref 38–47)
HGB BLD-MCNC: 9.7 G/DL (ref 12–16)
IMM GRANULOCYTES # BLD AUTO: 0.02 K/UL (ref 0–0.04)
IMM GRANULOCYTES NFR BLD: 0.4 % (ref 0–0.4)
LYMPHOCYTES # BLD AUTO: 1.18 K/UL (ref 1–4.8)
LYMPHOCYTES NFR BLD AUTO: 25.6 % (ref 27–41)
MCH RBC QN AUTO: 24.8 PG (ref 27–31)
MCHC RBC AUTO-ENTMCNC: 32.2 G/DL (ref 32–36)
MCV RBC AUTO: 77 FL (ref 80–96)
MONOCYTES # BLD AUTO: 0.44 K/UL (ref 0–0.8)
MONOCYTES NFR BLD AUTO: 9.5 % (ref 2–6)
MPC BLD CALC-MCNC: 11.3 FL (ref 9.4–12.4)
NEUTROPHILS # BLD AUTO: 2.73 K/UL (ref 1.8–7.7)
NEUTROPHILS NFR BLD AUTO: 59.3 % (ref 53–65)
NRBC # BLD AUTO: 0 X10E3/UL
NRBC, AUTO (.00): 0 %
PLATELET # BLD AUTO: 307 K/UL (ref 150–400)
POTASSIUM SERPL-SCNC: 4.2 MMOL/L (ref 3.5–5.1)
RBC # BLD AUTO: 3.91 M/UL (ref 4.2–5.4)
SODIUM SERPL-SCNC: 144 MMOL/L (ref 136–145)
THYROPEROXIDASE AB SERPL-ACNC: 3230 U/ML (ref 0–60)
WBC # BLD AUTO: 4.61 K/UL (ref 4.5–11)

## 2023-08-24 PROCEDURE — 99239 PR HOSPITAL DISCHARGE DAY,>30 MIN: ICD-10-PCS | Mod: GC,,, | Performed by: FAMILY MEDICINE

## 2023-08-24 PROCEDURE — 63600175 PHARM REV CODE 636 W HCPCS

## 2023-08-24 PROCEDURE — 11000001 HC ACUTE MED/SURG PRIVATE ROOM

## 2023-08-24 PROCEDURE — 85651 RBC SED RATE NONAUTOMATED: CPT

## 2023-08-24 PROCEDURE — 25000003 PHARM REV CODE 250: Performed by: NURSE PRACTITIONER

## 2023-08-24 PROCEDURE — 25000003 PHARM REV CODE 250

## 2023-08-24 PROCEDURE — 80048 BASIC METABOLIC PNL TOTAL CA: CPT

## 2023-08-24 PROCEDURE — 86225 DNA ANTIBODY NATIVE: CPT

## 2023-08-24 PROCEDURE — 99239 HOSP IP/OBS DSCHRG MGMT >30: CPT | Mod: GC,,, | Performed by: FAMILY MEDICINE

## 2023-08-24 PROCEDURE — 25000003 PHARM REV CODE 250: Performed by: GENERAL PRACTICE

## 2023-08-24 PROCEDURE — 86038 ANTINUCLEAR ANTIBODIES: CPT

## 2023-08-24 PROCEDURE — 97110 THERAPEUTIC EXERCISES: CPT

## 2023-08-24 PROCEDURE — 86376 MICROSOMAL ANTIBODY EACH: CPT

## 2023-08-24 PROCEDURE — 63600175 PHARM REV CODE 636 W HCPCS: Performed by: GENERAL PRACTICE

## 2023-08-24 PROCEDURE — 97116 GAIT TRAINING THERAPY: CPT | Mod: CQ

## 2023-08-24 PROCEDURE — 97110 THERAPEUTIC EXERCISES: CPT | Mod: CQ

## 2023-08-24 PROCEDURE — 85025 COMPLETE CBC W/AUTO DIFF WBC: CPT

## 2023-08-24 PROCEDURE — 86235 NUCLEAR ANTIGEN ANTIBODY: CPT

## 2023-08-24 RX ORDER — SODIUM CHLORIDE, SODIUM LACTATE, POTASSIUM CHLORIDE, CALCIUM CHLORIDE 600; 310; 30; 20 MG/100ML; MG/100ML; MG/100ML; MG/100ML
INJECTION, SOLUTION INTRAVENOUS CONTINUOUS
Status: DISCONTINUED | OUTPATIENT
Start: 2023-08-24 | End: 2023-08-25 | Stop reason: HOSPADM

## 2023-08-24 RX ORDER — LANOLIN ALCOHOL/MO/W.PET/CERES
1 CREAM (GRAM) TOPICAL DAILY
Status: DISCONTINUED | OUTPATIENT
Start: 2023-08-24 | End: 2023-08-25 | Stop reason: HOSPADM

## 2023-08-24 RX ADMIN — CLONAZEPAM 1 MG: 0.5 TABLET ORAL at 10:08

## 2023-08-24 RX ADMIN — FERROUS SULFATE TAB 325 MG (65 MG ELEMENTAL FE) 1 EACH: 325 (65 FE) TAB at 10:08

## 2023-08-24 RX ADMIN — METOPROLOL SUCCINATE 50 MG: 50 TABLET, EXTENDED RELEASE ORAL at 10:08

## 2023-08-24 RX ADMIN — LOSARTAN POTASSIUM 25 MG: 25 TABLET, FILM COATED ORAL at 10:08

## 2023-08-24 RX ADMIN — ENOXAPARIN SODIUM 40 MG: 100 INJECTION SUBCUTANEOUS at 04:08

## 2023-08-24 RX ADMIN — SODIUM CHLORIDE, POTASSIUM CHLORIDE, SODIUM LACTATE AND CALCIUM CHLORIDE: 600; 310; 30; 20 INJECTION, SOLUTION INTRAVENOUS at 10:08

## 2023-08-24 RX ADMIN — PAROXETINE 20 MG: 20 TABLET, FILM COATED ORAL at 10:08

## 2023-08-24 NOTE — PT/OT/SLP PROGRESS
Physical Therapy Treatment    Patient Name:  Kenia Carias   MRN:  [id number]    Recommendations:     Discharge Recommendations: nursing facility, skilled  Discharge Equipment Recommendations: other (see comments) (to be determined)  Barriers to discharge:  ongoing medical treatment    Assessment:     Kenia Carias is a 57 y.o. female admitted with a medical diagnosis of Elevated troponin.  She presents with the following impairments/functional limitations: weakness, impaired self care skills, impaired functional mobility, gait instability, decreased safety awareness, visual deficits.    Pt able to assist with activities much better than anticipated, however, demo decreased gait distance as compared to previous treatment session     PT POC discussed with Zahida Fortune DPT    Rehab Prognosis: Fair; patient would benefit from acute skilled PT services to address these deficits and reach maximum level of function.    Recent Surgery: Procedure(s) (LRB):  Left heart cath (Left) 3 Days Post-Op    Plan:     During this hospitalization, patient to be seen 5 x/week to address the identified rehab impairments via gait training, therapeutic activities, therapeutic exercises, neuromuscular re-education and progress toward the following goals:    Plan of Care Expires:  09/22/23    Subjective     Chief Complaint:   Patient/Family Comments/goals: pt agreeable  Pain/Comfort:         Objective:     Communicated with Fior Remy RN prior to session.  Patient found HOB elevated with SCD, bed alarm upon PT entry to room.     General Precautions: Standard, fall  Orthopedic Precautions: N/A  Braces: N/A  Respiratory Status: Room air     Functional Mobility:  Bed Mobility:     Supine to Sit: minimum assistance and verbal and tactile cueing  Sit to Supine: minimum assistance and with B LE management  Transfers:     Sit to Stand:  minimum assistance and of 1-2 persons with hand-held assist  Gait: 26' with minimum using HHA x 2 persons; slow  nevaeh, short tentative steps      AM-PAC 6 CLICK MOBILITY  Turning over in bed (including adjusting bedclothes, sheets and blankets)?: 3  Sitting down on and standing up from a chair with arms (e.g., wheelchair, bedside commode, etc.): 3  Moving from lying on back to sitting on the side of the bed?: 3  Moving to and from a bed to a chair (including a wheelchair)?: 3  Need to walk in hospital room?: 3  Climbing 3-5 steps with a railing?: 3  Basic Mobility Total Score: 18       Treatment & Education:  Pt performed 2 x 15 reps (B) LE exercises: ap, quad set, glut set, straight leg raise, hip ab/adduction, short arc quad, heel slide with assist and rest as needed    Contact guard assist to standby assist sitting EOB    Patient left HOB elevated with all lines intact, call button in reach, bed alarm on, and RN notified due to pt becoming diaphoretic during exercise.    GOALS:   Multidisciplinary Problems       Physical Therapy Goals          Problem: Physical Therapy    Goal Priority Disciplines Outcome Goal Variances Interventions   Physical Therapy Goal     PT, PT/OT Ongoing, Progressing     Description: Short Term Goals to be met by: 23    Patient will increase functional independence with mobility by performin. Supine to sit with Stand by assist  2. Sit to stand transfer with Stand by assist using hand held assist as needed  3. Bed to chair transfer with contact guard assist using hand held assist  4. Gait  x 100 feet with contact guard assist using hand held assist  5. Lower extremity exercise program x30 reps per handout, with assistance as needed  6. Pt to negotiate 2 steps with no rail with CGA and HHA    Long Term Goals to be met by: 23    Pt will regain full independent functional mobility with lowest level of assistive device to return to home situation and prior activities of daily living.                        Time Tracking:     PT Received On: 23  PT Start Time: 1400     PT Stop  Time: 1426  PT Total Time (min): 26 min     Billable Minutes: Gait Training 8 and Therapeutic Exercise 15    Treatment Type: Treatment  PT/PTA: PTA     Number of PTA visits since last PT visit: 1 08/24/2023

## 2023-08-24 NOTE — ASSESSMENT & PLAN NOTE
08/24  - Patient will be discharged to [facility] today  -Per , relatives agrees to the plan  -Per PT, patient will need more evaluation before returning home      08/23  - Per PT, patient is not ready to be discharge without more PT  -Planning to d/c to [facility]; Sister-in-law request  -Will continue to follow

## 2023-08-24 NOTE — ASSESSMENT & PLAN NOTE
08/25  -Discharged today to [facility]  -Brother will transport       08/24  - Patient will be discharged to [facility] today  -Per , relatives agrees to the plan  -Per PT, patient will need more evaluation before returning home  - P[place] transportation via [facility]      08/23  - Per PT, patient is not ready to be discharge without more PT  -Planning to d/c to [facility]; Sister-in-law request  -Will continue to follow

## 2023-08-24 NOTE — PLAN OF CARE
[facility] Tele[facility]  Discharge Final Note    Primary Care Provider: Elaine Clark MD    Expected Discharge Date: 8/24/2023    Final Discharge Note (most recent)       Final Note - 08/24/23 1154          Final Note    Assessment Type Final Discharge Note     Anticipated Discharge Disposition Swing Bed     What phone number can be called within the next 1-3 days to see how you are doing after discharge? [phone]        Post-Acute Status    Post-Acute Authorization Placement     Post-Acute Placement Status Set-up Complete/Auth obtained     Patient choice form signed by patient/caregiver List with quality metrics by geographic area provided;List from CMS Compare;List from System Post-Acute Care     Discharge Delays None known at this time                     Important Message from Medicare  Important Message from Medicare regarding Discharge Appeal Rights: Given to patient/caregiver, Explained to patient/caregiver, Signed/date by patient/caregiver     Date IMM was signed: 08/22/23  Time IMM was signed: 1204     Follow-up providers       Elaine Clark MD   Specialty: Family Medicine   Relationship: PCP - General    Jenni Freedman [facility]   Phone: [phone]       Next Steps: Schedule an appointment as soon as possible for a visit on 8/31/2023    Instructions: 2:00 pm              After-discharge care                Destination       [facility]   Service[facility]    [facility]  [place] [address]   Phone: [phone]                             Pt to dc on this day to [facility] swingbed. Packet taken to nurse. 0 dc needs noted.

## 2023-08-24 NOTE — PLAN OF CARE
Problem: Adult Inpatient Plan of Care  Goal: Plan of Care Review  8/24/2023 1732 by Fior Remy RN  Outcome: Ongoing, Progressing  8/24/2023 1731 by Fior Remy RN  Outcome: Ongoing, Progressing  Goal: Patient-Specific Goal (Individualized)  8/24/2023 1732 by Fior Remy RN  Outcome: Ongoing, Progressing  8/24/2023 1731 by Fior Remy RN  Outcome: Ongoing, Progressing  Goal: Absence of Hospital-Acquired Illness or Injury  8/24/2023 1732 by Fior Remy RN  Outcome: Ongoing, Progressing  8/24/2023 1731 by Fior Remy RN  Outcome: Ongoing, Progressing  Goal: Optimal Comfort and Wellbeing  8/24/2023 1732 by Fior Remy RN  Outcome: Ongoing, Progressing  8/24/2023 1731 by Fior Remy RN  Outcome: Ongoing, Progressing  Goal: Readiness for Transition of Care  8/24/2023 1732 by Fior Remy RN  Outcome: Ongoing, Progressing  8/24/2023 1731 by Firo Remy RN  Outcome: Ongoing, Progressing     Problem: Skin Injury Risk Increased  Goal: Skin Health and Integrity  8/24/2023 1732 by Fior Remy RN  Outcome: Ongoing, Progressing  8/24/2023 1731 by Fior Remy RN  Outcome: Ongoing, Progressing     Problem: Skin Injury Risk Increased  Goal: Skin Health and Integrity  8/24/2023 1732 by Fior Remy RN  Outcome: Ongoing, Progressing  8/24/2023 1731 by Fior Remy RN  Outcome: Ongoing, Progressing     Problem: Fall Injury Risk  Goal: Absence of Fall and Fall-Related Injury  8/24/2023 1732 by Fior Remy RN  Outcome: Ongoing, Progressing  8/24/2023 1731 by Fior Remy RN  Outcome: Ongoing, Progressing

## 2023-08-24 NOTE — DISCHARGE SUMMARY
[facility] Hospital Medicine  Discharge Summary      Patient Name: Kenia Carias  MRN: [id number]  [facility]: [id number]  Patient Class: IP- Inpatient  Admission Date: 8/19/2023  Hospital Length of Stay: 5 days  Discharge Date and Time:  08/24/2023 11:46 AM  Attending Physician: [facility] Swenson Jr., MD   Discharging Provider: Boris Lemons MD  Primary Care Provider: Elaine Clark MD    Primary Care Team: Networked reference to record PCT     HPI:   Patient is a 58 y/o female with PMHx of HTN, mental disability and blindness who presents to [facility] transferred from [facility] due to elevated troponin. Patient is not able to provide a history. History was taken from her brother present in the room. Patient's brother states that he took the patient to the hospital because she has been extreme weak and had a fall yesterday at home that was not witnessed, she was found on the floor alert. Patient's brother cannot precise how long patient was on the floor. He states that at baseline the patient does not voluntarily hold a conversation since she cannot elaborate phrases. Patient can feed herself but needs assistance most of the times. She walks to the bathroom and back to her bedroom on her own and stays most of the time lying in bed. Patient became disabled due to problems during birth. Patient's brother denies history of MI or stroke. Patient has a caretaker that helps with daily chores and family lives next door.    In the ED vital signs showed /88, , RR 20, SpO2 98% at room air, afebrile. Blood work showed H/H 10.3/31.8, WBC 7.22, , sodium 145 potassium 3.1, BUN/Cr 16/0.75, glucose 168, CRP 8.84, lipid panel no hypercholesterolemia, p-, elevated troponin 686, 926, 698, 543, EKG nsr with HR 93. CT head showing no acute intracranial abnormalities. Chest xray showing no infiltrates or consolidation. Patient will be admitted to the hospital for further  management.      Procedure(s) (LRB):  Left heart cath (Left)      Hospital Course:   On August 20, 2023 patient was admitted to the hospital medicine service for progressive weakness and a recent fall. At that time, Cardiology was consulted for elevated tropoinin levels in the 500s. CT of the head showed no acute evidents. Echo EF 55%. Patient was placed on purewick. The following day patient underwent a Left Heart Catheterization that showed no stenosis and/or blockage. Patient denied angina, dyspnea or headaches.     On Tuesday, August 22, 2023 Patient was cleared by cardiology and was ready for discharge back home pending physical therapy recommendations. At that time, ordered d-dimer to furthered investigate her initial dyspnea on admission. Results showed slightly elevated at 0.85. Followed up with a CTA that showed negative pulmonary embolism. Patient denied any dyspnea or discomfort. In addition, patient TSH was noted to be undetectable( <0.005) and free T4 3.34 as a result of hyperthyroidism. Ultrasound of the thyroid showed thyroiditis. Patient will need to follow-up with PCP or Endocrinologist at a later date.    Today, Patient is feeling much better and ready for discharge to [facility]. Per PT, patient will benefit for more evaluation before returning back home. Per , the relatives agrees with the current plan.    Discharged medications: Continue home medications;  additional or adjustments of medications with PCP if needed    Follow-up: Primary care, Dr. Dangelo in 1-2 weeks         Goals of Care Treatment Preferences:  Code Status: Full Code      Consults:   Consults (From admission, onward)        Status Ordering Provider     Inpatient consult to Social Work  Once        Provider:  (Not yet assigned)    Completed JESSICA BOLANOS     Inpatient consult to Cardiology  Once        Provider:  (Not yet assigned)    Completed ROMELIA PEÑALOZA          Neuro  Mental disability  Per family  patient has been disable since birth. Family unclear on the cause.  Per family at baseline patient can feed herself but needs assistance with feeding most of the time. She walks to the bathroom and back to her bedroom on her own and stays most of the time lying in bed. She has a caretaker who is planning on coming to stay with her in the hospital tomorrow.    Tremor of unknown origin  08/20  - Left hand tremor      Other  Generalized weakness  08/24  - Patient will be discharged to [facility] today  -Per , relatives agrees to the plan  -Per PT, patient will need more evaluation before returning home      08/23  - Per PT, patient is not ready to be discharge without more PT  -Planning to d/c to [facility]; Sister-in-law request  -Will continue to follow          Final Active Diagnoses:    Diagnosis Date Noted POA    Mental disability [F79] 08/19/2023 Yes    Tremor of unknown origin [R25.1] 08/19/2023 Yes    Generalized weakness [R53.1] 08/19/2023 Yes      Problems Resolved During this Admission:    Diagnosis Date Noted Date Resolved POA    PRINCIPAL PROBLEM:  Elevated troponin [R77.8] 08/19/2023 08/24/2023 Yes    Hypotension [I95.9] 08/19/2023 08/24/2023 Yes    Leukocytosis [D72.829] 08/19/2023 08/24/2023 Yes       Discharged Condition: good    Disposition: Home or Self Care    Follow Up:   Contact information for follow-up providers     Elaine Clark MD. Schedule an appointment as soon as possible for a visit in 1 week(s).    Specialty: Family Medicine  Contact information:  [facility] [address]  [phone]                   Contact information for after-discharge care     Destination     [facility] .    Service: Skilled Nursing  Contact information:  [address] [address] [address] [phone]                           Patient Instructions:   No discharge procedures on file.    Significant Diagnostic Studies: Labs: All labs within the past  24 hours have been reviewed    Pending Diagnostic Studies:     Procedure Component Value Units Date/Time    Anti-DNA Ab, Double-Stranded [[id number]] Collected: 08/24/23 0429    Order Status: Sent Lab Status: In process Updated: 08/24/23 1125    Specimen: Blood     Antiextractable Nuclear Ag [[id number]] Collected: 08/24/23 0429    Order Status: Sent Lab Status: In process Updated: 08/24/23 1125    Specimen: Blood     EKG 12-lead [[id number]]     Order Status: Sent Lab Status: No result     EKG 12-lead [[id number]]     Order Status: Sent Lab Status: No result     EXTRA TUBES [[id number]] Collected: 08/22/23 1155    Order Status: Sent Lab Status: In process Updated: 08/22/23 1201    Specimen: Blood, Venous     Narrative:      The following orders were created for panel order EXTRA TUBES.  Procedure                               Abnormality         Status                     ---------                               -----------         ------                     Lavender Top Hold[[id number]]                                In process                   Please view results for these tests on the individual orders.    EXTRA TUBES [[id number]] Collected: 08/20/23 0721    Order Status: Sent Lab Status: In process Updated: 08/20/23 0721    Specimen: Blood, Venous     Narrative:      The following orders were created for panel order EXTRA TUBES.  Procedure                               Abnormality         Status                     ---------                               -----------         ------                     Lavender Top Hold[[id number]]                                In process                   Please view results for these tests on the individual orders.         Medications:  Reconciled Home Medications:      Medication List      CONTINUE taking these medications    clonazePAM 1 MG tablet  Commonly known as: KlonoPIN  Take 1 mg by mouth nightly as needed.     LASIX 20 MG tablet  Generic drug: furosemide  Take 1 tablet by  mouth twice a week. Takes Monday and Thursday as needed for swelling in legs     losartan 25 MG tablet  Commonly known as: COZAAR  Take 25 mg by mouth once daily.     metoprolol tartrate 25 MG tablet  Commonly known as: LOPRESSOR  Take 25 mg by mouth 2 (two) times daily.     PaxiL 20 MG tablet  Generic drug: paroxetine  Take 1 tablet by mouth once daily.     potassium citrate 99 mg Cap  Take 1 tablet by mouth twice a week.     rosuvastatin 5 MG tablet  Commonly known as: CRESTOR  Take 5 mg by mouth once daily.            Indwelling Lines/Drains at time of discharge:   Lines/Drains/Airways     None                 Time spent on the discharge of patient: 40 minutes         Boris Lemons MD  Department of Hospital Medicine  [facility] - [address] [place] [address]

## 2023-08-24 NOTE — PLAN OF CARE
Pt has been accepted for swingbed admission at [facility]. If medically stable, pt can admit on today. Sw to notify physician at this time.

## 2023-08-24 NOTE — SUBJECTIVE & OBJECTIVE
Interval History: Patient was seen and examined at bedside. No acute overnight. Patient is ready for discharge at this time. [facility]     Review of Systems   Constitutional:  Negative for activity change, appetite change and fatigue.   Respiratory:  Negative for chest tightness, shortness of breath and wheezing.    Cardiovascular:  Negative for chest pain.   Gastrointestinal:  Negative for abdominal distention, abdominal pain, diarrhea, nausea and vomiting.   Genitourinary:  Negative for difficulty urinating.   Neurological:  Negative for dizziness, light-headedness and headaches.     Objective:     Vital Signs (Most Recent):  Temp: 98.6 °F (37 °C) (08/24/23 1100)  Pulse: 96 (08/24/23 1100)  Resp: 20 (08/24/23 1100)  BP: 135/78 (08/24/23 1100)  SpO2: 96 % (08/24/23 1100) Vital Signs (24h Range):  Temp:  [97.7 °F (36.5 °C)-99.3 °F (37.4 °C)] 98.6 °F (37 °C)  Pulse:  [80-96] 96  Resp:  [20-32] 20  SpO2:  [96 %-98 %] 96 %  BP: (105-157)/(64-78) 135/78     Weight: 79.1 kg (174 lb 6.1 oz)  Body mass index is 25.02 kg/m².  No intake or output data in the 24 hours ending 08/24/23 1705      Physical Exam  Constitutional:       General: She is not in acute distress.     Appearance: Normal appearance. She is not ill-appearing.   HENT:      Head: Normocephalic and atraumatic.   Cardiovascular:      Rate and Rhythm: Normal rate.      Heart sounds: No murmur heard.     No friction rub. No gallop.   Pulmonary:      Effort: Pulmonary effort is normal. No respiratory distress.      Breath sounds: Normal breath sounds. No wheezing.   Abdominal:      General: Abdomen is flat. There is no distension.      Tenderness: There is no abdominal tenderness.      Hernia: No hernia is present.   Musculoskeletal:      Right lower leg: No edema.      Left lower leg: No edema.   Skin:     Findings: No lesion or rash.   Neurological:      Mental Status: She is alert and oriented  to person, place, and time.             Significant Labs: All pertinent labs within the past 24 hours have been reviewed.    Significant Imaging: I have reviewed all pertinent imaging results/findings within the past 24 hours.

## 2023-08-24 NOTE — HOSPITAL COURSE
On August 20, 2023 patient was admitted to the hospital medicine service for progressive weakness and a recent fall. At that time, Cardiology was consulted for elevated tropoinin levels in the 500s. CT of the head showed no acute evidents. Echo EF 55%. Patient was placed on purewick. The following day patient underwent a Left Heart Catheterization that showed no stenosis and/or blockage. Patient denied angina, dyspnea or headaches.     On Tuesday, August 22, 2023 Patient was cleared by cardiology and was ready for discharge back home pending physical therapy recommendations. At that time, ordered d-dimer to furthered investigate her initial dyspnea on admission. Results showed slightly elevated at 0.85. Followed up with a CTA that showed negative pulmonary embolism. Patient denied any dyspnea or discomfort. In addition, patient TSH was noted to be undetectable( <0.005) and free T4 3.34 as a result of hyperthyroidism. Ultrasound of the thyroid showed thyroiditis. Patient will need to follow-up with PCP or Endocrinologist at a later date.    Today, Patient is feeling much better and ready for discharge to [facility]. Per PT, patient will benefit for more evaluation before returning back home. Per , the relatives agrees with the current plan. Patient brother will transport her SWB. Recommend follow-up with endocrinologist for radioactive uptake scan for thyroiditis.     Discharged medications: Continue home medications;  additional or adjustments of medications with PCP if needed    Follow-up: Primary care, Dr. Dangelo in 1-2 weeks; [facility] Endocr[facility]ology in one week

## 2023-08-24 NOTE — PT/OT/SLP PROGRESS
Occupational Therapy   Treatment    Name: Kenia Carias  MRN: [id number]  Admitting Diagnosis:  Elevated troponin  3 Days Post-Op    Recommendations:     Discharge Recommendations: nursing facility, skilled  Discharge Equipment Recommendations:  other (see comments) (to be determined)  Barriers to discharge:  Other (Comment) (ongoing medical treatment)    Assessment:     Kenia Carias is a 57 y.o. female with a medical diagnosis of Elevated troponin.  She presents with weakness and decline in ADLs. Performance deficits affecting function are weakness, impaired endurance, impaired self care skills, impaired functional mobility, gait instability, impaired balance, decreased safety awareness.     Rehab Prognosis:  Good; patient would benefit from acute skilled OT services to address these deficits and reach maximum level of function.       Plan:     Patient to be seen 5 x/week to address the above listed problems via self-care/home management, therapeutic activities, therapeutic exercises  Plan of Care Expires: 09/19/23  Plan of Care Reviewed with: patient    Subjective     Chief Complaint: weakness  Patient/Family Comments/goals: pt agreeable to OT tx  Pain/Comfort:  Pain Rating 1: 0/10    Objective:     Communicated with: HORTENCIA Childress prior to session.  Patient found HOB elevated with bed alarm, peripheral IV, telemetry upon OT entry to room.    General Precautions: Standard, fall    Orthopedic Precautions:N/A  Braces: N/A  Respiratory Status: Room air     Occupational Performance:     Bed Mobility:    Not performed     Functional Mobility/Transfers:  Not performed    Activities of Daily Living:  Not performed      [facility] 6 Click ADL:      Treatment & Education:  Pt performed 2 sets x 15 reps each bicep curls 2#db, chest press 2#db, IR/ER 2#db, rows green tband, and tricep press green tband in order to improve BUE strength and endurance to perform ADL and ADL t/f with less assist.     Patient left HOB elevated with all  lines intact, call button in reach, bed alarm on, and telesitter present    GOALS:   Multidisciplinary Problems       Occupational Therapy Goals          Problem: Occupational Therapy    Goal Priority Disciplines Outcome Interventions   Occupational Therapy Goal     OT, PT/OT Ongoing, Progressing    Description: ST.Pt will perform bathing with ModA with setup at EOB  2.Pt will perform UE dressing with Cristal  3.Pt will perform LE dressing with ModA  4.Pt will transfer bed/chair/bsc with CGA with hand held assist  5.Pt will perform standing task x 2 min with CGA with hand held assist  6.Tolerate 15 min of tx without fatigue.      LTG:   Restore to max I with selfcare and mobility.                           Time Tracking:     OT Date of Treatment: 23  OT Start Time: 1009  OT Stop Time: 1032  OT Total Time (min): 23 min    Billable Minutes:Therapeutic Exercise 23 minutes    OT/CHELITA: OT          2023

## 2023-08-24 NOTE — DISCHARGE INSTRUCTIONS
Patient will follow-up with PCP for recent hospitalization in 1-2 weeks    Patient will follow-up with PCP for thyroiditis and for furthered evaluation    Patient will continue current home medications

## 2023-08-25 ENCOUNTER — APPOINTMENT (OUTPATIENT)
Dept: RADIOLOGY | Facility: HOSPITAL | Age: 58
End: 2023-08-25
Payer: MEDICARE

## 2023-08-25 ENCOUNTER — HOSPITAL ENCOUNTER (INPATIENT)
Facility: HOSPITAL | Age: 58
LOS: 7 days | Discharge: NURSING, REHAB OR LONG-TERM CARE | DRG: 948 | End: 2023-09-01
Attending: EMERGENCY MEDICINE | Admitting: FAMILY MEDICINE
Payer: MEDICARE

## 2023-08-25 VITALS
OXYGEN SATURATION: 99 % | RESPIRATION RATE: 18 BRPM | DIASTOLIC BLOOD PRESSURE: 88 MMHG | HEART RATE: 82 BPM | SYSTOLIC BLOOD PRESSURE: 133 MMHG | TEMPERATURE: 98 F | WEIGHT: 174.38 LBS | HEIGHT: 70 IN | BODY MASS INDEX: 24.97 KG/M2

## 2023-08-25 DIAGNOSIS — I10 PRIMARY HYPERTENSION: ICD-10-CM

## 2023-08-25 DIAGNOSIS — N30.00 ACUTE CYSTITIS WITHOUT HEMATURIA: ICD-10-CM

## 2023-08-25 DIAGNOSIS — R53.81 PHYSICAL DECONDITIONING: ICD-10-CM

## 2023-08-25 DIAGNOSIS — F79 MENTAL DISABILITY: ICD-10-CM

## 2023-08-25 DIAGNOSIS — M62.81 MUSCLE WEAKNESS: Primary | ICD-10-CM

## 2023-08-25 LAB
ALBUMIN SERPL BCP-MCNC: 2.4 G/DL (ref 3.5–5)
ALBUMIN/GLOB SERPL: 0.7 {RATIO}
ALP SERPL-CCNC: 81 U/L (ref 46–118)
ALT SERPL W P-5'-P-CCNC: 70 U/L (ref 13–56)
ANION GAP SERPL CALCULATED.3IONS-SCNC: 10 MMOL/L (ref 7–16)
ANION GAP SERPL CALCULATED.3IONS-SCNC: 8 MMOL/L (ref 7–16)
AST SERPL W P-5'-P-CCNC: 45 U/L (ref 15–37)
BASOPHILS # BLD AUTO: 0.01 K/UL (ref 0–0.2)
BASOPHILS # BLD AUTO: 0.01 K/UL (ref 0–0.2)
BASOPHILS NFR BLD AUTO: 0.2 % (ref 0–1)
BASOPHILS NFR BLD AUTO: 0.2 % (ref 0–1)
BILIRUB SERPL-MCNC: 0.5 MG/DL (ref ?–1.2)
BUN SERPL-MCNC: 10 MG/DL (ref 7–18)
BUN SERPL-MCNC: 9 MG/DL (ref 7–18)
BUN/CREAT SERPL: 18 (ref 6–20)
BUN/CREAT SERPL: 19 (ref 6–20)
CALCIUM SERPL-MCNC: 8.6 MG/DL (ref 8.5–10.1)
CALCIUM SERPL-MCNC: 9.3 MG/DL (ref 8.5–10.1)
CHLORIDE SERPL-SCNC: 107 MMOL/L (ref 98–107)
CHLORIDE SERPL-SCNC: 107 MMOL/L (ref 98–107)
CO2 SERPL-SCNC: 29 MMOL/L (ref 21–32)
CO2 SERPL-SCNC: 31 MMOL/L (ref 21–32)
CREAT SERPL-MCNC: 0.48 MG/DL (ref 0.55–1.02)
CREAT SERPL-MCNC: 0.57 MG/DL (ref 0.55–1.02)
DIFFERENTIAL METHOD BLD: ABNORMAL
DIFFERENTIAL METHOD BLD: ABNORMAL
EGFR (NO RACE VARIABLE) (RUSH/TITUS): 107 ML/MIN/1.73M2
EGFR (NO RACE VARIABLE) (RUSH/TITUS): 112 ML/MIN/1.73M2
EOSINOPHIL # BLD AUTO: 0.27 K/UL (ref 0–0.5)
EOSINOPHIL # BLD AUTO: 0.28 K/UL (ref 0–0.5)
EOSINOPHIL NFR BLD AUTO: 4.9 % (ref 1–4)
EOSINOPHIL NFR BLD AUTO: 5.6 % (ref 1–4)
ERYTHROCYTE [DISTWIDTH] IN BLOOD BY AUTOMATED COUNT: 13.1 % (ref 11.5–14.5)
ERYTHROCYTE [DISTWIDTH] IN BLOOD BY AUTOMATED COUNT: 13.2 % (ref 11.5–14.5)
GLOBULIN SER-MCNC: 3.4 G/DL (ref 2–4)
GLUCOSE SERPL-MCNC: 108 MG/DL (ref 74–106)
GLUCOSE SERPL-MCNC: 96 MG/DL (ref 74–106)
HCT VFR BLD AUTO: 29.9 % (ref 38–47)
HCT VFR BLD AUTO: 32.8 % (ref 38–47)
HGB BLD-MCNC: 10.2 G/DL (ref 12–16)
HGB BLD-MCNC: 9.9 G/DL (ref 12–16)
IMM GRANULOCYTES # BLD AUTO: 0.01 K/UL (ref 0–0.04)
IMM GRANULOCYTES NFR BLD: 0.2 % (ref 0–0.4)
LYMPHOCYTES # BLD AUTO: 0.92 K/UL (ref 1–4.8)
LYMPHOCYTES # BLD AUTO: 1.35 K/UL (ref 1–4.8)
LYMPHOCYTES NFR BLD AUTO: 16.5 % (ref 27–41)
LYMPHOCYTES NFR BLD AUTO: 27.1 % (ref 27–41)
MAGNESIUM SERPL-MCNC: 1.9 MG/DL (ref 1.7–2.3)
MCH RBC QN AUTO: 24.5 PG (ref 27–31)
MCH RBC QN AUTO: 26.2 PG (ref 27–31)
MCHC RBC AUTO-ENTMCNC: 31.1 G/DL (ref 32–36)
MCHC RBC AUTO-ENTMCNC: 33.1 G/DL (ref 32–36)
MCV RBC AUTO: 78.8 FL (ref 80–96)
MCV RBC AUTO: 79.1 FL (ref 80–96)
MONOCYTES # BLD AUTO: 0.45 K/UL (ref 0–0.8)
MONOCYTES # BLD AUTO: 0.53 K/UL (ref 0–0.8)
MONOCYTES NFR BLD AUTO: 9 % (ref 2–6)
MONOCYTES NFR BLD AUTO: 9.5 % (ref 2–6)
MPC BLD CALC-MCNC: 11 FL (ref 9.4–12.4)
MPC BLD CALC-MCNC: 11.4 FL (ref 9.4–12.4)
NEUTROPHILS # BLD AUTO: 2.89 K/UL (ref 1.8–7.7)
NEUTROPHILS # BLD AUTO: 3.83 K/UL (ref 1.8–7.7)
NEUTROPHILS NFR BLD AUTO: 57.9 % (ref 53–65)
NEUTROPHILS NFR BLD AUTO: 68.9 % (ref 53–65)
NRBC # BLD AUTO: 0 X10E3/UL
NRBC, AUTO (.00): 0 %
PLATELET # BLD AUTO: 314 K/UL (ref 150–400)
PLATELET # BLD AUTO: 353 K/UL (ref 150–400)
POTASSIUM SERPL-SCNC: 4.2 MMOL/L (ref 3.5–5.1)
POTASSIUM SERPL-SCNC: 4.3 MMOL/L (ref 3.5–5.1)
PROT SERPL-MCNC: 5.8 G/DL (ref 6.4–8.2)
RBC # BLD AUTO: 3.78 M/UL (ref 4.2–5.4)
RBC # BLD AUTO: 4.16 M/UL (ref 4.2–5.4)
SODIUM SERPL-SCNC: 142 MMOL/L (ref 136–145)
SODIUM SERPL-SCNC: 142 MMOL/L (ref 136–145)
WBC # BLD AUTO: 4.99 K/UL (ref 4.5–11)
WBC # BLD AUTO: 5.56 K/UL (ref 4.5–11)

## 2023-08-25 PROCEDURE — 99900035 HC TECH TIME PER 15 MIN (STAT)

## 2023-08-25 PROCEDURE — 85025 COMPLETE CBC W/AUTO DIFF WBC: CPT

## 2023-08-25 PROCEDURE — 27000981 HC MATTRESS, ACCUCAIR DAILY RENTAL

## 2023-08-25 PROCEDURE — 71045 X-RAY EXAM CHEST 1 VIEW: CPT | Mod: TC

## 2023-08-25 PROCEDURE — 94761 N-INVAS EAR/PLS OXIMETRY MLT: CPT

## 2023-08-25 PROCEDURE — 63600175 PHARM REV CODE 636 W HCPCS: Performed by: NURSE PRACTITIONER

## 2023-08-25 PROCEDURE — 82306 VITAMIN D 25 HYDROXY: CPT | Performed by: NURSE PRACTITIONER

## 2023-08-25 PROCEDURE — 27000958

## 2023-08-25 PROCEDURE — 80048 BASIC METABOLIC PNL TOTAL CA: CPT | Mod: XB

## 2023-08-25 PROCEDURE — 85025 COMPLETE CBC W/AUTO DIFF WBC: CPT | Performed by: NURSE PRACTITIONER

## 2023-08-25 PROCEDURE — 80053 COMPREHEN METABOLIC PANEL: CPT | Performed by: NURSE PRACTITIONER

## 2023-08-25 PROCEDURE — 11000004 HC SNF PRIVATE

## 2023-08-25 PROCEDURE — 25000003 PHARM REV CODE 250

## 2023-08-25 PROCEDURE — 83735 ASSAY OF MAGNESIUM: CPT | Performed by: NURSE PRACTITIONER

## 2023-08-25 PROCEDURE — 25000003 PHARM REV CODE 250: Performed by: NURSE PRACTITIONER

## 2023-08-25 PROCEDURE — 25000003 PHARM REV CODE 250: Performed by: GENERAL PRACTICE

## 2023-08-25 RX ORDER — ACETAMINOPHEN 325 MG/1
650 TABLET ORAL EVERY 4 HOURS PRN
Status: DISCONTINUED | OUTPATIENT
Start: 2023-08-25 | End: 2023-09-01 | Stop reason: HOSPADM

## 2023-08-25 RX ORDER — ONDANSETRON 4 MG/1
4 TABLET, ORALLY DISINTEGRATING ORAL EVERY 8 HOURS PRN
Status: DISCONTINUED | OUTPATIENT
Start: 2023-08-25 | End: 2023-09-01 | Stop reason: HOSPADM

## 2023-08-25 RX ORDER — METOPROLOL TARTRATE 25 MG/1
25 TABLET, FILM COATED ORAL 2 TIMES DAILY
Status: DISCONTINUED | OUTPATIENT
Start: 2023-08-25 | End: 2023-09-01 | Stop reason: HOSPADM

## 2023-08-25 RX ORDER — ADHESIVE BANDAGE
30 BANDAGE TOPICAL DAILY PRN
Status: DISCONTINUED | OUTPATIENT
Start: 2023-08-25 | End: 2023-09-01 | Stop reason: HOSPADM

## 2023-08-25 RX ORDER — PAROXETINE 10 MG/1
20 TABLET, FILM COATED ORAL DAILY
Status: DISCONTINUED | OUTPATIENT
Start: 2023-08-26 | End: 2023-09-01 | Stop reason: HOSPADM

## 2023-08-25 RX ORDER — TALC
6 POWDER (GRAM) TOPICAL NIGHTLY PRN
Status: DISCONTINUED | OUTPATIENT
Start: 2023-08-25 | End: 2023-09-01 | Stop reason: HOSPADM

## 2023-08-25 RX ORDER — SENNOSIDES 8.6 MG/1
8.6 TABLET ORAL DAILY PRN
Status: DISCONTINUED | OUTPATIENT
Start: 2023-08-25 | End: 2023-09-01 | Stop reason: HOSPADM

## 2023-08-25 RX ORDER — ATORVASTATIN CALCIUM 10 MG/1
20 TABLET, FILM COATED ORAL NIGHTLY
Status: DISCONTINUED | OUTPATIENT
Start: 2023-08-25 | End: 2023-09-01 | Stop reason: HOSPADM

## 2023-08-25 RX ORDER — MAG HYDROX/ALUMINUM HYD/SIMETH 200-200-20
30 SUSPENSION, ORAL (FINAL DOSE FORM) ORAL EVERY 6 HOURS PRN
Status: DISCONTINUED | OUTPATIENT
Start: 2023-08-25 | End: 2023-09-01 | Stop reason: HOSPADM

## 2023-08-25 RX ORDER — ENOXAPARIN SODIUM 100 MG/ML
40 INJECTION SUBCUTANEOUS EVERY 24 HOURS
Status: DISCONTINUED | OUTPATIENT
Start: 2023-08-25 | End: 2023-09-01 | Stop reason: HOSPADM

## 2023-08-25 RX ORDER — CLONAZEPAM 0.5 MG/1
1 TABLET ORAL NIGHTLY PRN
Status: DISCONTINUED | OUTPATIENT
Start: 2023-08-25 | End: 2023-09-01 | Stop reason: HOSPADM

## 2023-08-25 RX ORDER — FUROSEMIDE 20 MG/1
20 TABLET ORAL DAILY PRN
Status: DISCONTINUED | OUTPATIENT
Start: 2023-08-25 | End: 2023-09-01 | Stop reason: HOSPADM

## 2023-08-25 RX ORDER — LOSARTAN POTASSIUM 25 MG/1
25 TABLET ORAL DAILY
Status: DISCONTINUED | OUTPATIENT
Start: 2023-08-26 | End: 2023-09-01 | Stop reason: HOSPADM

## 2023-08-25 RX ADMIN — CLONAZEPAM 1 MG: 0.5 TABLET ORAL at 10:08

## 2023-08-25 RX ADMIN — PAROXETINE 20 MG: 20 TABLET, FILM COATED ORAL at 10:08

## 2023-08-25 RX ADMIN — ATORVASTATIN CALCIUM 20 MG: 10 TABLET, FILM COATED ORAL at 09:08

## 2023-08-25 RX ADMIN — LOSARTAN POTASSIUM 25 MG: 25 TABLET, FILM COATED ORAL at 10:08

## 2023-08-25 RX ADMIN — ENOXAPARIN SODIUM 40 MG: 40 INJECTION SUBCUTANEOUS at 05:08

## 2023-08-25 RX ADMIN — FERROUS SULFATE TAB 325 MG (65 MG ELEMENTAL FE) 1 EACH: 325 (65 FE) TAB at 10:08

## 2023-08-25 RX ADMIN — METOPROLOL TARTRATE 25 MG: 25 TABLET, FILM COATED ORAL at 09:08

## 2023-08-25 RX ADMIN — METOPROLOL SUCCINATE 50 MG: 50 TABLET, EXTENDED RELEASE ORAL at 10:08

## 2023-08-25 NOTE — HPI
Ms Carias is a 56 y/o AA/F with PMH HTN, intellectual disability and blindness being admitted to [facility] for rehabilitation services for physical deconditioning following a stay at [facility]. She initially present to [facility] with increased weakness, dyspnea and fall. She was found to have elevated Troponin and subsequently transferred to [facility] on 8/20 for further cardiac work-up. Cardiology was consulted for elevated tropoinin levels in the 500s. CT of the head showed no acute evidents. Echo EF 55%. Patient was placed on purewick. She underwent a Left Heart Catheterization on 8/21 that showed no stenosis and/or blockage and was cleared by cardiology on 8/22. D-dimer was done to furthered investigate her initial dyspnea on admission. Results showed slightly elevated at 0.85. Follow up CTA negative for pulmonary embolism. In addition, TSH was noted to be undetectable( <0.005) and free T4 3.34 as a result of hyperthyroidism. Ultrasound of the thyroid showed thyroiditis. Patient will need to follow-up with PCP or Endocrinologist at a later date for radioactive uptake scan for thyroiditis. She will be admitted to [facility] for PT/OT evaluation and treatement, lab monitoring and medications management. Brother spoke with  and notes that they are trying to find long term NH placement at /. He notes that baseline ambulatory status prior to hospitalization was ambulatory to RR but typically stayed in bed. She can feed herself but requires assistance.    Code Status: Full Code

## 2023-08-25 NOTE — NURSING
9354 Pt brought by personal car via brotherDavid. Pt brought personal belongings including a ring, house shoes, shirt, and pants. Skin assessment completed by rn and HORTENCIA Parmar. Pt and family updated on call bell usage and hospital rounding. Verbalized understanding. No needs or concerns voiced at this time. Safety measures in place. Call bell in reach. Bed low. Side rails up x3. Will continue plan of care.

## 2023-08-25 NOTE — PLAN OF CARE
Problem: Adult Inpatient Plan of Care  Goal: Plan of Care Review  Outcome: Ongoing, Progressing  Goal: Patient-Specific Goal (Individualized)  Outcome: Ongoing, Progressing  Goal: Absence of Hospital-Acquired Illness or Injury  Outcome: Ongoing, Progressing  Goal: Optimal Comfort and Wellbeing  Outcome: Ongoing, Progressing  Goal: Readiness for Transition of Care  Outcome: Ongoing, Progressing     Problem: Fall Injury Risk  Goal: Absence of Fall and Fall-Related Injury  Outcome: Ongoing, Progressing     Problem: Hypertension Comorbidity  Goal: Blood Pressure in Desired Range  Outcome: Ongoing, Progressing

## 2023-08-25 NOTE — PROGRESS NOTES
Ms Carias is a 58 y/o AA/F with PMH HTN, intellectual disability and blindness being admitted to [facility] for rehabilitation services for physical deconditioning following a stay at [facility]. She initially present to [facility] with increased weakness, dyspnea and fall. She was found to have elevated Troponin and subsequently transferred to [facility] on 8/20 for further cardiac work-up. Cardiology was consulted for elevated tropoinin levels in the 500s. CT of the head showed no acute evidents. Echo EF 55%. Patient was placed on purewick. She underwent a Left Heart Catheterization on 8/21 that showed no stenosis and/or blockage and was cleared by cardiology on 8/22. D-dimer was done to furthered investigate her initial dyspnea on admission. Results showed slightly elevated at 0.85. Follow up CTA negative for pulmonary embolism. In addition, TSH was noted to be undetectable( <0.005) and free T4 3.34 as a result of hyperthyroidism. Ultrasound of the thyroid showed thyroiditis. Patient will need to follow-up with PCP or Endocrinologist at a later date for radioactive uptake scan for thyroiditis. She will be admitted to [facility] for PT/OT evaluation and treatement, lab monitoring and medications management. Brother spoke with  and notes that they are trying to find long term NH placement at [facility]. He notes that baseline ambulatory status prior to hospitalization was ambulatory to RR but typically stayed in bed. She can feed herself but requires assistance.    On admission, patient found to be lying in bed awake and alert. She is not conversant but will answer questions with short one to two word answers which brother notes is her baseline. Resp even and unlabored. Cardiac RRR. Abd soft, non-tender, non-distended with normoactive bowel sounds to all 4 quads. Non-pitting edema to BLE. H&P to be completed by oncoming physician. PT/OT to eval and treat.

## 2023-08-25 NOTE — PLAN OF CARE
[facility] - Medical Surgical Unit  Initial Discharge Assessment       Primary Care Provider: Elaine Clark MD    Admission Diagnosis: Generalized weakness [R53.1]  Elevated troponin level [R77.8]  Status post left heart catheterization (LHC) [Z98.890]  Leukocytosis [D72.829]  Intellectual disability [F79]  Tremor of left hand [R25.1]  Hyperthyroidism [E05.90]  Hypotension [I95.9]  Fall at home [W19.XXXA, Y92.009]  H/O blindness [Z86.69]  Detached retina [H33.20]  HTN (hypertension) [I10]  HLD (hyperlipidemia) [E78.5]    Admission Date: 8/25/2023  Expected Discharge Date:     Transition of Care Barriers: None    Payor: MEDICARE / Plan: MEDICARE PART A & B / Product Type: Government /     Extended Emergency Contact Information  Primary Emergency Contact: EDELMIRA SZYMANSKI  Mobile Phone: [phone]  Relation: Brother  Preferred language: English   needed? No  Secondary Emergency Contact: Apolonia Szymanski  Mobile Phone: [phone]  Relation: Other  Preferred language: English   needed? No    Discharge Plan A: Home, Home Health  Discharge Plan B: New Nursing Home placement - jail care facility      [facility] [place] [facility] [place] - [address] [facility]  [address] [facility] [address] [address]  Phone: [phone] Fax: [phone]    [facility] [place] - [address] [facility] [place]  [address] [facility] [place]  [place] [address]  Phone: [phone] Fax: [phone]    [facility] [place] [place] - [address]  [address] [facility] [address]  Phone: [phone] Fax: [phone]      Initial Assessment (most recent)       Adult Discharge Assessment - 08/25/23 1445          Discharge Assessment    Assessment Type Discharge Planning Assessment     Confirmed/corrected address, phone number and insurance Yes     Confirmed Demographics Correct on Facesheet     Source of Information patient;family;health record     If unable to respond/provide  information was family/caregiver contacted? Yes     Contact Name/Number David Carias brother [phone]     Communicated PAWEL with patient/caregiver Date not available/Unable to determine     Reason For Admission Falls, generalized weakness, elevated troponin s/p LHC, leukocytosis, tremors, hyperthyroidblindness, intellectual disability     People in Home alone   has a caregiver that lives with her fulltime    Facility Arrived From: [facility]     Do you expect to return to your current living situation? Yes     Do you have help at home or someone to help you manage your care at home? Yes     Who are your caregiver(s) and their phone number(s)? David Carias, brother     Prior to hospitilization cognitive status: Not Oriented to Place;Not Oriented to Time     Current cognitive status: Not Oriented to Place;Not Oriented to Time     Walking or Climbing Stairs ambulation difficulty, requires equipment;stair climbing difficulty, requires equipment;transferring difficulty, requires equipment;transferring difficulty, assistance 1 person     Mobility Management RW     Dressing/Bathing bathing difficulty, assistance 1 person;dressing difficulty, assistance 1 person     Home Accessibility wheelchair accessible     Home Layout Able to live on 1st floor     Equipment Currently Used at Home none     Readmission within 30 days? No     Patient currently being followed by outpatient case management? No     Do you currently have service(s) that help you manage your care at home? No     Is the pt/caregiver preference to resume services with current agency No     Do you take prescription medications? Yes     Do you have prescription coverage? Yes     Coverage Medicare     Do you have any problems affording any of your prescribed medications? No     Is the patient taking medications as prescribed? yes     Who is going to help you get home at discharge? brother Hernandez     How do you get to doctors appointments? family or friend  will provide     Are you on dialysis? No     Do you take coumadin? No     DME Needed Upon Discharge  wheelchair;walker, rolling     Discharge Plan discussed with: POA     Name(s) and Number(s) David Carias, brother and POA     Transition of Care Barriers None     Discharge Plan A Home;Home Health     Discharge Plan B New Nursing Home placement - [facility] care facility        Physical Activity    On average, how many days per week do you engage in moderate to strenuous exercise (like a brisk walk)? 0 days     On average, how many minutes do you engage in exercise at this level? 0 min        Financial Resource Strain    How hard is it for you to pay for the very basics like food, housing, medical care, and heating? Not hard at all   David Carias answered for pt.       Housing Stability    In the last 12 months, was there a time when you were not able to pay the mortgage or rent on time? No     In the last 12 months, how many places have you lived? 1     In the last 12 months, was there a time when you did not have a steady place to sleep or slept in a shelter (including now)? No        Transportation Needs    In the past 12 months, has lack of transportation kept you from medical appointments or from getting medications? No     In the past 12 months, has lack of transportation kept you from meetings, work, or from getting things needed for daily living? No        Food Insecurity    Within the past 12 months, you worried that your food would run out before you got the money to buy more. Never true     Within the past 12 months, the food you bought just didn't last and you didn't have money to get more. Never true        Stress    Do you feel stress - tense, restless, nervous, or anxious, or unable to sleep at night because your mind is troubled all the time - these days? --   unable to determine from pt.       Social Connections    In a typical week, how many times do you talk on the phone with family, friends, or  neighbors? More than three times a week     How often do you get together with friends or relatives? More than three times a week     How often do you attend Yarsani or Rastafari services? Never     Do you belong to any clubs or organizations such as Yarsani groups, unions, fraternal or athletic groups, or school groups? Yes     How often do you attend meetings of the clubs or organizations you belong to? Never     Are you , , , , never , or living with a partner? Never         Alcohol Use    Q1: How often do you have a drink containing alcohol? Never     Q2: How many drinks containing alcohol do you have on a typical day when you are drinking? Patient does not drink     Q3: How often do you have six or more drinks on one occasion? Never        OTHER    Name(s) of People in Home caregiver                 Pt. Admitted to swing bed services for continued rehabilitation, medication management, and supportive care following hospital admission. Pt. Hospitalized after a fall at home and found to have elevated troponins with a LHC done. Heart cath showed no blockages. Pt. Also found to have hyperthyroidism and will f/u with an Endocrinologist appt. Pt. Lives alone but has a caregiver that has moved in and stays with her fulltime. Pt. Brother is POA and also assists in her care. Pt. Has intellectual disabilities and is blind but prior level of functioning was to ambulate around the home independently and was able to go to the bathroom on her own. Pt. Brother states that they have already started working on getting pt. Placed into a NH in [place]. Will assist with placement in NH at d/c. Will cont. To follow pt. And assist as needed throughout stay.

## 2023-08-25 NOTE — PLAN OF CARE
[facility] - [facility] Telemetry  Discharge Final Note    Primary Care Provider: Elaine Clark MD    Expected Discharge Date: 8/25/2023    Final Discharge Note (most recent)       Final Note - 08/24/23 1154          Final Note    Assessment Type Final Discharge Note     Anticipated Discharge Disposition Swing Bed     What phone number can be called within the next 1-3 days to see how you are doing after discharge? [phone]        Post-Acute Status    Post-Acute Authorization Placement     Post-Acute Placement Status Set-up Complete/Auth obtained     Patient choice form signed by patient/caregiver List with quality metrics by geographic area provided;List from CMS Compare;List from System Post-Acute Care     Discharge Delays None known at this time                     Important Message from Medicare  Important Message from Medicare regarding Discharge Appeal Rights: Given to patient/caregiver, Explained to patient/caregiver, Signed/date by patient/caregiver     Date IMM was signed: 08/22/23  Time IMM was signed: 1204     Follow-up providers       Elaine Clark MD   Specialty: Family Medicine   Relationship: PCP - General    Jenni Nicholson  [facility] [address]   Phone: [phone]       Next Steps: Schedule an appointment as soon as possible for a visit on 8/31/2023    Instructions: 2:00 pm    [facility]    [address] [address]   Phone: [phone]       Next Steps: Schedule an appointment as soon as possible for a visit in 1 week(s)              After-discharge care                Destination       [facility]   Service: Sk[facility]    [address] [facility] [address]  [place] [address]   Phone: [phone]                             Pt to dc [facility] on this day. Son to transport. No further dc needs noted.    1329 sw received call back from pt's legal guardian(s) requesting that  additional referral information is sent to [facility] and rehab for Nursing Home admission post swingbed. Clinicals faxing at this time.

## 2023-08-25 NOTE — DISCHARGE SUMMARY
[facility] - [facility] Telemetry Hospital Medicine  Discharge Summary      Patient Name: Kenia Carias  MRN: [id number]  Banner: [id number]  Patient Class: IP- Inpatient  Admission Date: 8/19/2023  Hospital Length of Stay: 6 days  Discharge Date and Time:  08/25/2023 12:22 PM  Attending Physician: Adrian Swenson Jr., MD   Discharging Provider: Boris Lemons MD  Primary Care Provider: Elaine Clark MD    Primary Care Team: Networked reference to record PCT     HPI:   Patient is a 58 y/o female with PMHx of HTN, mental disability and blindness who presents to [facility] transferred from [facility] due to elevated troponin. Patient is not able to provide a history. History was taken from her brother present in the room. Patient's brother states that he took the patient to the hospital because she has been extreme weak and had a fall yesterday at home that was not witnessed, she was found on the floor alert. Patient's brother cannot precise how long patient was on the floor. He states that at baseline the patient does not voluntarily hold a conversation since she cannot elaborate phrases. Patient can feed herself but needs assistance most of the times. She walks to the bathroom and back to her bedroom on her own and stays most of the time lying in bed. Patient became disabled due to problems during birth. Patient's brother denies history of MI or stroke. Patient has a caretaker that helps with daily chores and family lives next door.    In the ED vital signs showed /88, , RR 20, SpO2 98% at room air, afebrile. Blood work showed H/H 10.3/31.8, WBC 7.22, , sodium 145 potassium 3.1, BUN/Cr 16/0.75, glucose 168, CRP 8.84, lipid panel no hypercholesterolemia, p-, elevated troponin 686, 926, 698, 543, EKG nsr with HR 93. CT head showing no acute intracranial abnormalities. Chest xray showing no infiltrates or consolidation. Patient will be admitted to the hospital for further  management.      Procedure(s) (LRB):  Left heart cath (Left)      Hospital Course:   On August 20, 2023 patient was admitted to the hospital medicine service for progressive weakness and a recent fall. At that time, Cardiology was consulted for elevated tropoinin levels in the 500s. CT of the head showed no acute evidents. Echo EF 55%. Patient was placed on purewick. The following day patient underwent a Left Heart Catheterization that showed no stenosis and/or blockage. Patient denied angina, dyspnea or headaches.     On Tuesday, August 22, 2023 Patient was cleared by cardiology and was ready for discharge back home pending physical therapy recommendations. At that time, ordered d-dimer to furthered investigate her initial dyspnea on admission. Results showed slightly elevated at 0.85. Followed up with a CTA that showed negative pulmonary embolism. Patient denied any dyspnea or discomfort. In addition, patient TSH was noted to be undetectable( <0.005) and free T4 3.34 as a result of hyperthyroidism. Ultrasound of the thyroid showed thyroiditis. Patient will need to follow-up with PCP or Endocrinologist at a later date.    Today, Patient is feeling much better and ready for discharge [facility]. Per PT, patient will benefit for more evaluation before returning back home. Per , the relatives agrees with the current plan. Patient brother will transport her [facility]. Recommend follow-up with endocrinologist for radioactive uptake scan for thyroiditis.     Discharged medications: Continue home medications;  additional or adjustments of medications with PCP if needed    Follow-up: Primary care, Dr. Dangelo in 1-2 weeks; [facility] End[facility]ology in one week          Goals of Care Treatment Preferences:  Code Status: Full Code      Consults:   Consults (From admission, onward)        Status Ordering Provider     Inpatient consult to Social Work  Once        Provider:  (Not yet assigned)     Completed JESSICA BOLANOS     Inpatient consult to Cardiology  Once        Provider:  (Not yet assigned)    Completed STEFFISHANELROMELIA          Neuro  Mental disability  Per family patient has been disable since birth. Family unclear on the cause.  Per family at baseline patient can feed herself but needs assistance with feeding most of the time. She walks to the bathroom and back to her bedroom on her own and stays most of the time lying in bed. She has a caretaker who is planning on coming to stay with her in the hospital tomorrow.    Tremor of unknown origin  08/20  - Left hand tremor      Other  Generalized weakness  08/25  -Discharged today to [facility]  -Brother will transport       08/24  - Patient will be discharged to [facility] today  -Per , relatives agrees to the plan  -Per PT, patient will need more evaluation before returning home  - Pending transportation via metro      08/23  - Per PT, patient is not ready to be discharge without more PT  -Planning to d/c to [facility]; Sister-in-law request  -Will continue to follow          Final Active Diagnoses:    Diagnosis Date Noted POA    Mental disability [F79] 08/19/2023 Yes    Tremor of unknown origin [R25.1] 08/19/2023 Yes    Generalized weakness [R53.1] 08/19/2023 Yes      Problems Resolved During this Admission:    Diagnosis Date Noted Date Resolved POA    PRINCIPAL PROBLEM:  Elevated troponin [R77.8] 08/19/2023 08/24/2023 Yes    Hypotension [I95.9] 08/19/2023 08/24/2023 Yes    Leukocytosis [D72.829] 08/19/2023 08/24/2023 Yes       Discharged Condition: good    Disposition: Home or Self Care    Follow Up:   Contact information for follow-up providers     Elaine Clark MD. Schedule an appointment as soon as possible for a visit on 8/31/2023.    Specialty: Family Medicine  Why: 2:00 pm  Contact information:  [place] [address] [phone]             [facility]. Schedule an  appointment as soon as possible for a visit in 1 week(s).    Contact information:  [address] medical towers [place] [address]  [phone]                   Contact information for after-discharge care     Destination     [facility] .    Service: Skilled Nursing  Contact information:  [address] [address] [address]  [phone]                           Patient Instructions:   No discharge procedures on file.    Significant Diagnostic Studies: Labs: All labs within the past 24 hours have been reviewed    Pending Diagnostic Studies:     Procedure Component Value Units Date/Time    EKG 12-lead [[id number]]     Order Status: Sent Lab Status: No result     EKG 12-lead [[id number]]     Order Status: Sent Lab Status: No result     EXTRA TUBES [[id number]] Collected: 08/22/23 1155    Order Status: Sent Lab Status: In process Updated: 08/22/23 1201    Specimen: Blood, Venous     Narrative:      The following orders were created for panel order EXTRA TUBES.  Procedure                               Abnormality         Status                     ---------                               -----------         ------                     Lavender Top Hold[[id number]]                                In process                   Please view results for these tests on the individual orders.    EXTRA TUBES [[id number]] Collected: 08/20/23 0721    Order Status: Sent Lab Status: In process Updated: 08/20/23 0721    Specimen: Blood, Venous     Narrative:      The following orders were created for panel order EXTRA TUBES.  Procedure                               Abnormality         Status                     ---------                               -----------         ------                     Lavender Top Hold[[id number]]                                In process                   Please view results for these tests on the individual orders.         Medications:  Reconciled Home Medications:       Medication List      CONTINUE taking these medications    clonazePAM 1 MG tablet  Commonly known as: KlonoPIN  Take 1 mg by mouth nightly as needed.     LASIX 20 MG tablet  Generic drug: furosemide  Take 1 tablet by mouth twice a week. Takes Monday and Thursday as needed for swelling in legs     losartan 25 MG tablet  Commonly known as: COZAAR  Take 25 mg by mouth once daily.     metoprolol tartrate 25 MG tablet  Commonly known as: LOPRESSOR  Take 25 mg by mouth 2 (two) times daily.     PaxiL 20 MG tablet  Generic drug: paroxetine  Take 1 tablet by mouth once daily.     potassium citrate 99 mg Cap  Take 1 tablet by mouth twice a week.     rosuvastatin 5 MG tablet  Commonly known as: CRESTOR  Take 5 mg by mouth once daily.            Indwelling Lines/Drains at time of discharge:   Lines/Drains/Airways     None                 Time spent on the discharge of patient: 40 minutes         Boris Lemons MD  Department of Hospital Medicine  [facility] - [facility] Telemetry

## 2023-08-26 PROCEDURE — 11000004 HC SNF PRIVATE

## 2023-08-26 PROCEDURE — 27000958

## 2023-08-26 PROCEDURE — 63600175 PHARM REV CODE 636 W HCPCS: Performed by: NURSE PRACTITIONER

## 2023-08-26 PROCEDURE — 99900035 HC TECH TIME PER 15 MIN (STAT)

## 2023-08-26 PROCEDURE — 25000003 PHARM REV CODE 250: Performed by: NURSE PRACTITIONER

## 2023-08-26 PROCEDURE — 94761 N-INVAS EAR/PLS OXIMETRY MLT: CPT

## 2023-08-26 RX ADMIN — LOSARTAN POTASSIUM 25 MG: 25 TABLET, FILM COATED ORAL at 08:08

## 2023-08-26 RX ADMIN — METOPROLOL TARTRATE 25 MG: 25 TABLET, FILM COATED ORAL at 08:08

## 2023-08-26 RX ADMIN — ENOXAPARIN SODIUM 40 MG: 40 INJECTION SUBCUTANEOUS at 05:08

## 2023-08-26 RX ADMIN — PAROXETINE 20 MG: 10 TABLET, FILM COATED ORAL at 08:08

## 2023-08-26 RX ADMIN — ATORVASTATIN CALCIUM 20 MG: 10 TABLET, FILM COATED ORAL at 08:08

## 2023-08-26 NOTE — PLAN OF CARE
Problem: Adult Inpatient Plan of Care  Goal: Plan of Care Review  Outcome: Ongoing, Progressing  Goal: Patient-Specific Goal (Individualized)  Outcome: Ongoing, Progressing  Goal: Absence of Hospital-Acquired Illness or Injury  Outcome: Ongoing, Progressing  Goal: Optimal Comfort and Wellbeing  Outcome: Ongoing, Progressing  Goal: Readiness for Transition of Care  Outcome: Ongoing, Progressing     Problem: Fall Injury Risk  Goal: Absence of Fall and Fall-Related Injury  Outcome: Ongoing, Progressing     Problem: Hypertension Comorbidity  Goal: Blood Pressure in Desired Range  Outcome: Ongoing, Progressing     Problem: Skin Injury Risk Increased  Goal: Skin Health and Integrity  Outcome: Ongoing, Progressing     Problem: Impaired Wound Healing  Goal: Optimal Wound Healing  Outcome: Ongoing, Progressing

## 2023-08-27 LAB
25(OH)D3 SERPL-MCNC: 10.9 NG/ML
BACTERIA #/AREA URNS HPF: ABNORMAL /HPF
BILIRUB UR QL STRIP: NEGATIVE
CLARITY UR: ABNORMAL
COLOR UR: ABNORMAL
GLUCOSE UR STRIP-MCNC: NEGATIVE MG/DL
KETONES UR STRIP-SCNC: NEGATIVE MG/DL
LEUKOCYTE ESTERASE UR QL STRIP: ABNORMAL
MUCOUS THREADS #/AREA URNS HPF: ABNORMAL /HPF
NITRITE UR QL STRIP: NEGATIVE
PH UR STRIP: 8 PH UNITS
PROT UR QL STRIP: NEGATIVE
RBC # UR STRIP: ABNORMAL /UL
RBC #/AREA URNS HPF: ABNORMAL /HPF
SP GR UR STRIP: 1.02
SQUAMOUS #/AREA URNS LPF: ABNORMAL /LPF
UROBILINOGEN UR STRIP-ACNC: 0.2 MG/DL
WBC #/AREA URNS HPF: ABNORMAL /HPF
YEAST #/AREA URNS HPF: ABNORMAL /HPF

## 2023-08-27 PROCEDURE — 27000981 HC MATTRESS, ACCUCAIR DAILY RENTAL

## 2023-08-27 PROCEDURE — 94761 N-INVAS EAR/PLS OXIMETRY MLT: CPT

## 2023-08-27 PROCEDURE — 87077 CULTURE AEROBIC IDENTIFY: CPT | Performed by: NURSE PRACTITIONER

## 2023-08-27 PROCEDURE — 99900035 HC TECH TIME PER 15 MIN (STAT)

## 2023-08-27 PROCEDURE — 81001 URINALYSIS AUTO W/SCOPE: CPT | Performed by: NURSE PRACTITIONER

## 2023-08-27 PROCEDURE — 11000004 HC SNF PRIVATE

## 2023-08-27 PROCEDURE — 25000003 PHARM REV CODE 250: Performed by: NURSE PRACTITIONER

## 2023-08-27 PROCEDURE — 63600175 PHARM REV CODE 636 W HCPCS: Performed by: NURSE PRACTITIONER

## 2023-08-27 PROCEDURE — 27000958

## 2023-08-27 PROCEDURE — 87186 SC STD MICRODIL/AGAR DIL: CPT | Performed by: NURSE PRACTITIONER

## 2023-08-27 RX ADMIN — METOPROLOL TARTRATE 25 MG: 25 TABLET, FILM COATED ORAL at 08:08

## 2023-08-27 RX ADMIN — LOSARTAN POTASSIUM 25 MG: 25 TABLET, FILM COATED ORAL at 08:08

## 2023-08-27 RX ADMIN — ATORVASTATIN CALCIUM 20 MG: 10 TABLET, FILM COATED ORAL at 08:08

## 2023-08-27 RX ADMIN — ENOXAPARIN SODIUM 40 MG: 40 INJECTION SUBCUTANEOUS at 05:08

## 2023-08-27 RX ADMIN — PAROXETINE 20 MG: 10 TABLET, FILM COATED ORAL at 08:08

## 2023-08-27 NOTE — RESPIRATORY THERAPY
Patient unable to perform Incentive Spirometer.  Room AIR SAT = 99%. HR 78  RR 16  Breath sounds = diminished clear. NO cough noted.

## 2023-08-27 NOTE — PROGRESS NOTES
Nurses Note -- 4 Eyes      8/26/2023   7:45 PM      Skin assessed during: Q Shift Change      [] No Altered Skin Integrity Present    [x]Prevention Measures Documented      [x] Yes- Altered Skin Integrity Present or Discovered   [] LDA Added if Not in Epic (Describe Wound)   [] New Altered Skin Integrity was Present on Admit and Documented in LDA   [] Wound Image Taken    Wound Care Consulted? No    Attending Nurse:  RODGER Anderson RN    Second RN/Staff Member: YONG Vora RN

## 2023-08-28 PROCEDURE — 25000003 PHARM REV CODE 250: Performed by: NURSE PRACTITIONER

## 2023-08-28 PROCEDURE — 25000003 PHARM REV CODE 250: Performed by: EMERGENCY MEDICINE

## 2023-08-28 PROCEDURE — 97166 OT EVAL MOD COMPLEX 45 MIN: CPT

## 2023-08-28 PROCEDURE — 99305 1ST NF CARE MODERATE MDM 35: CPT | Mod: AI,,, | Performed by: FAMILY MEDICINE

## 2023-08-28 PROCEDURE — 27000941

## 2023-08-28 PROCEDURE — 99900035 HC TECH TIME PER 15 MIN (STAT)

## 2023-08-28 PROCEDURE — 63600175 PHARM REV CODE 636 W HCPCS: Performed by: NURSE PRACTITIONER

## 2023-08-28 PROCEDURE — 97161 PT EVAL LOW COMPLEX 20 MIN: CPT

## 2023-08-28 PROCEDURE — 11000004 HC SNF PRIVATE

## 2023-08-28 PROCEDURE — 27000981 HC MATTRESS, ACCUCAIR DAILY RENTAL

## 2023-08-28 PROCEDURE — 94761 N-INVAS EAR/PLS OXIMETRY MLT: CPT

## 2023-08-28 PROCEDURE — 99305 PR NURSING FACILITY CARE, INIT, MOD SEVERITY: ICD-10-PCS | Mod: AI,,, | Performed by: FAMILY MEDICINE

## 2023-08-28 RX ORDER — FERROUS SULFATE, DRIED 160(50) MG
1 TABLET, EXTENDED RELEASE ORAL 2 TIMES DAILY
Status: DISCONTINUED | OUTPATIENT
Start: 2023-08-28 | End: 2023-09-01 | Stop reason: HOSPADM

## 2023-08-28 RX ORDER — FAMOTIDINE 20 MG/1
20 TABLET, FILM COATED ORAL 2 TIMES DAILY
Status: DISCONTINUED | OUTPATIENT
Start: 2023-08-28 | End: 2023-09-01 | Stop reason: HOSPADM

## 2023-08-28 RX ORDER — NITROFURANTOIN 25; 75 MG/1; MG/1
100 CAPSULE ORAL 2 TIMES DAILY
Status: DISCONTINUED | OUTPATIENT
Start: 2023-08-28 | End: 2023-08-29

## 2023-08-28 RX ORDER — CHOLECALCIFEROL (VITAMIN D3) 125 MCG
5000 CAPSULE ORAL DAILY
Status: DISCONTINUED | OUTPATIENT
Start: 2023-08-28 | End: 2023-09-01 | Stop reason: HOSPADM

## 2023-08-28 RX ADMIN — FAMOTIDINE 20 MG: 20 TABLET, FILM COATED ORAL at 08:08

## 2023-08-28 RX ADMIN — Medication 1 TABLET: at 11:08

## 2023-08-28 RX ADMIN — ENOXAPARIN SODIUM 40 MG: 40 INJECTION SUBCUTANEOUS at 04:08

## 2023-08-28 RX ADMIN — FAMOTIDINE 20 MG: 20 TABLET, FILM COATED ORAL at 11:08

## 2023-08-28 RX ADMIN — Medication 5000 UNITS: at 11:08

## 2023-08-28 RX ADMIN — METOPROLOL TARTRATE 25 MG: 25 TABLET, FILM COATED ORAL at 08:08

## 2023-08-28 RX ADMIN — Medication 1 TABLET: at 08:08

## 2023-08-28 RX ADMIN — PAROXETINE 20 MG: 10 TABLET, FILM COATED ORAL at 09:08

## 2023-08-28 RX ADMIN — LOSARTAN POTASSIUM 25 MG: 25 TABLET, FILM COATED ORAL at 09:08

## 2023-08-28 RX ADMIN — ATORVASTATIN CALCIUM 20 MG: 10 TABLET, FILM COATED ORAL at 08:08

## 2023-08-28 RX ADMIN — METOPROLOL TARTRATE 25 MG: 25 TABLET, FILM COATED ORAL at 09:08

## 2023-08-28 RX ADMIN — Medication 1 CAPSULE: at 11:08

## 2023-08-28 RX ADMIN — NITROFURANTOIN MONOHYDRATE/MACROCRYSTALS 100 MG: 25; 75 CAPSULE ORAL at 08:08

## 2023-08-28 RX ADMIN — NITROFURANTOIN MONOHYDRATE/MACROCRYSTALS 100 MG: 25; 75 CAPSULE ORAL at 11:08

## 2023-08-28 NOTE — PT/OT/SLP EVAL
Occupational Therapy   Evaluation    Name: Kenia Carias  MRN: [id number]  Admitting Diagnosis: Physical deconditioning; Fall; blindness; impulsive; Tremors; inability to care of self  Recent Surgery: * No surgery found *      Recommendations:     Discharge Recommendations: home with home health, nursing facility, skilled  Discharge Equipment Recommendations:  walker, rolling, wheelchair  Barriers to discharge:       Assessment:     Kenia Carias is a 57 y.o. female with a medical diagnosis of Physical deconditioning  Fall; blindness; impulsive; Tremors; inability to care of self.  She presents with decreased balance, safety and impulsivity. Performance deficits affecting function: weakness, impaired self care skills, impaired functional mobility, impaired balance, decreased safety awareness, decreased ROM (Blindness). Pt participated fairly with OT but noted to have excessive tremors and decreased balance and safety.      Rehab Prognosis: Fair; patient would benefit from acute skilled OT services to address these deficits and reach maximum level of function.       Plan:     Patient to be seen 5 x/week to address the above listed problems via self-care/home management, therapeutic activities, therapeutic exercises, neuromuscular re-education  Plan of Care Expires:    Plan of Care Reviewed with: patient    Subjective     Chief Complaint: decreased balance, safety;   Patient/Family Comments/goals: decreased mobility; and ADL performance.     Occupational Profile:  Living Environment: lives with family  Previous level of function: TBD Mod I  Equipment Used at Home: none  Assistance upon Discharge: TBD    Pain/Comfort:  Pain Rating 1: 0/10    Patients cultural, spiritual, Oriental orthodox conflicts given the current situation: no    Objective:     Communicated with: Pt prior to session.  Patient found supine   upon OT entry to room.    General Precautions: Standard, fall  Orthopedic Precautions:    Braces:    Respiratory Status:  Room air    Occupational Performance:    Bed Mobility:    Patient completed Rolling/Turning to Left with  minimum assistance  Patient completed Rolling/Turning to Right with minimum assistance  Patient completed Supine to Sit with minimum assistance    Functional Mobility/Transfers:  Patient completed Sit <> Stand Transfer with moderate assistance  with  rolling walker   Patient completed Toilet Transfer Step Transfer technique with moderate assistance with  rolling walker  Functional Mobility: Pt completed functional ambulation to the restroom using the RW mod for device management and orientation     Activities of Daily Living:  Lower Body Dressing: maximal assistance    Toileting: moderate assistance due to decreased safety    Cognitive/Visual Perceptual:  Cognitive/Psychosocial Skills:     -       Oriented to: Person   -       Follows Commands/attention:Follows two-step commands  -       Safety awareness/insight to disability: impaired     Physical Exam:  Balance:    -       Fair minuse  Upper Extremity Range of Motion:     -       Right Upper Extremity: Deficits: WFL  -       Left Upper Extremity: Deficits: WFL  Upper Extremity Strength:    -       Right Upper Extremity: 2+/5  -       Left Upper Extremity: 3-/5    AMPAC 6 Click ADL:  AMPAC Total Score: 14    Treatment & Education:  OT eval     Patient left supine with all lines intact and call button in reach    GOALS:   Multidisciplinary Problems       Occupational Therapy Goals          Problem: Occupational Therapy    Goal Priority Disciplines Outcome Interventions   Occupational Therapy Goal     OT, PT/OT Ongoing, Progressing    Description: Goals to be met by: 9/2923     Patient will increase functional independence with ADLs by performing:    Feeding with Modified [place] and Supervision.  UE Dressing with Modified [place] and Set-up Assistance.  LE Dressing with Modified [place] and Set-up Assistance.  Grooming while seated with Modified  [place] and Set-up Assistance.  Toileting from toilet with Contact Guard Assistance and Minimal Assistance for hygiene and clothing management.   Standing standing x3-5 minutes with Contact Guard Assistance.  Stand pivot transfers with Contact Guard Assistance.  Step transfer with Contact Guard Assistance  Toilet transfer to toilet with Contact Guard Assistance.                         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

## 2023-08-28 NOTE — SUBJECTIVE & OBJECTIVE
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     Current Outpatient Medications on File Prior to Encounter   Medication Sig    clonazePAM (KLONOPIN) 1 MG tablet Take 1 mg by mouth nightly as needed.    furosemide (LASIX) 20 MG tablet Take 1 tablet by mouth twice a week. Takes Monday and Thursday as needed for swelling in legs    losartan (COZAAR) 25 MG tablet Take 25 mg by mouth once daily.    metoprolol tartrate (LOPRESSOR) 25 MG tablet Take 25 mg by mouth 2 (two) times daily.    paroxetine (PAXIL) 20 MG tablet Take 1 tablet by mouth once daily.    potassium citrate 99 mg Cap Take 1 tablet by mouth twice a week.    rosuvastatin (CRESTOR) 5 MG tablet Take 5 mg by mouth once daily.     Family History    None       Tobacco Use    Smoking status: Never    Smokeless tobacco: Never   Substance and Sexual Activity    Alcohol use: Never    Drug use: Not on file    Sexual activity: Never     Review of Systems   Constitutional: Negative.    HENT: Negative.     Eyes: Negative.    Respiratory: Negative.     Cardiovascular: Negative.    Gastrointestinal: Negative.    Endocrine: Negative.    Genitourinary: Negative.    Musculoskeletal: Negative.    Skin: Negative.    Allergic/Immunologic: Negative.    Neurological: Negative.    Hematological: Negative.    Psychiatric/Behavioral: Negative.     All other systems reviewed and are negative.    Objective:     Vital Signs (Most  Recent):  Temp: 98.5 °F (36.9 °C) (08/27/23 2000)  Pulse: 78 (08/27/23 2000)  Resp: 18 (08/27/23 2000)  BP: (!) 149/81 (08/27/23 2000)  SpO2: 97 % (08/27/23 2000) Vital Signs (24h Range):  Temp:  [98.4 °F (36.9 °C)-98.5 °F (36.9 °C)] 98.5 °F (36.9 °C)  Pulse:  [70-83] 78  Resp:  [18-20] 18  SpO2:  [97 %-99 %] 97 %  BP: (137-149)/(81-82) 149/81     Weight: 78.5 kg (173 lb)  Body mass index is 24.82 kg/m².     Physical Exam           Significant Labs: All pertinent labs within the past 24 hours have been reviewed.    Significant Imaging: I have reviewed all pertinent imaging results/findings within the past 24 hours.

## 2023-08-28 NOTE — PLAN OF CARE
"Visited with pt. This morning and she stated that she was "ok" and that she was still eating her breakfast. Pt. Is legally blind and may need someone to assist her with feeding. Pt. Has flat affect overall. Plan for pt. Is to work on strengthening and pt. May go to a NH in [place]. Pt. BrotherDavid is looking into placement. Will follow.  "

## 2023-08-28 NOTE — H&P
[facility] - Medical Surgical Unit  Hospital Medicine  History & Physical    Patient Name: Kenia Carias  MRN: [id number]  Patient Class: IP- Swing  Admission Date: 8/25/2023  Attending Physician: Rosemary Woodson MD   Primary Care Provider: Elaine Clark MD         Patient information was obtained from past medical records and ER records.     Subjective:     Principal Problem:Physical deconditioning    Chief Complaint: No chief complaint on file.       HPI: Ms Carias is a 58 y/o AA/F with PMH HTN, intellectual disability and blindness being admitted to [facility] for rehabilitation services for physical deconditioning following a stay at [facility]. She initially present to [facility] [place] [facility] with increased weakness, dyspnea and fall. She was found to have elevated Troponin and subsequently transferred to [facility] on 8/20 for further cardiac work-up. Cardiology was consulted for elevated tropoinin levels in the 500s. CT of the head showed no acute evidents. Echo EF 55%. Patient was placed on purewick. She underwent a Left Heart Catheterization on 8/21 that showed no stenosis and/or blockage and was cleared by cardiology on 8/22. D-dimer was done to furthered investigate her initial dyspnea on admission. Results showed slightly elevated at 0.85. Follow up CTA negative for pulmonary embolism. In addition, TSH was noted to be undetectable( <0.005) and free T4 3.34 as a result of hyperthyroidism. Ultrasound of the thyroid showed thyroiditis. Patient will need to follow-up with PCP or Endocrinologist at a later date for radioactive uptake scan for thyroiditis. She will be admitted to [facility] for PT/OT evaluation and treatement, lab monitoring and medications management. Brother spoke with  and notes that they are trying to find long term NH placement at /. He notes that baseline ambulatory status prior to hospitalization was ambulatory to RR  but typically stayed in bed. She can feed herself but requires assistance.    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     Current Outpatient Medications on File Prior to Encounter   Medication Sig    clonazePAM (KLONOPIN) 1 MG tablet Take 1 mg by mouth nightly as needed.    furosemide (LASIX) 20 MG tablet Take 1 tablet by mouth twice a week. Takes Monday and Thursday as needed for swelling in legs    losartan (COZAAR) 25 MG tablet Take 25 mg by mouth once daily.    metoprolol tartrate (LOPRESSOR) 25 MG tablet Take 25 mg by mouth 2 (two) times daily.    paroxetine (PAXIL) 20 MG tablet Take 1 tablet by mouth once daily.    potassium citrate 99 mg Cap Take 1 tablet by mouth twice a week.    rosuvastatin (CRESTOR) 5 MG tablet Take 5 mg by mouth once daily.     Family History    None       Tobacco Use    Smoking status: Never    Smokeless tobacco: Never   Substance and Sexual Activity    Alcohol use: Never    Drug use: Not on file    Sexual activity: Never     Review of Systems   Constitutional: Negative.    HENT: Negative.     Eyes: Negative.    Respiratory: Negative.     Cardiovascular: Negative.    Gastrointestinal: Negative.    Endocrine: Negative.    Genitourinary: Negative.    Musculoskeletal: Negative.    Skin: Negative.    Allergic/Immunologic: Negative.    Neurological: Negative.    Hematological:  Negative.    Psychiatric/Behavioral: Negative.     All other systems reviewed and are negative.    Objective:     Vital Signs (Most Recent):  Temp: 98.5 °F (36.9 °C) (08/27/23 2000)  Pulse: 78 (08/27/23 2000)  Resp: 18 (08/27/23 2000)  BP: (!) 149/81 (08/27/23 2000)  SpO2: 97 % (08/27/23 2000) Vital Signs (24h Range):  Temp:  [98.4 °F (36.9 °C)-98.5 °F (36.9 °C)] 98.5 °F (36.9 °C)  Pulse:  [70-83] 78  Resp:  [18-20] 18  SpO2:  [97 %-99 %] 97 %  BP: (137-149)/(81-82) 149/81     Weight: 78.5 kg (173 lb)  Body mass index is 24.82 kg/m².     Physical Exam           Significant Labs: All pertinent labs within the past 24 hours have been reviewed.    Significant Imaging: I have reviewed all pertinent imaging results/findings within the past 24 hours.    Assessment/Plan:  1. Elevated troponin patient with generalized muscle weakness.  Leukocytosis.  Hypertension.  Tremor of the left hand.  Hyperthyroidism.  And weakness.     * Physical deconditioning  PT eval and treat  OT eval and treat  Weekly CBC, BMP  Admit labs CBC, CMP, Vit D, Mag, UA, CXR      Mental disability  Assistance with meals  Fall precautions      Cortical age-related cataract of both eyes  Fall precautions  Assistance with ADLS        VTE Risk Mitigation (From admission, onward)         Ordered     enoxaparin injection 40 mg  Daily         08/25/23 1514     IP VTE HIGH RISK PATIENT  Once         08/25/23 1514     Place KEITH hose  Until discontinued         08/25/23 1514     Place sequential compression device  Until discontinued         08/25/23 1514                           Hamlet Manzanares DO  Department of Hospital Medicine  [facility] - Medical Surgical Unit

## 2023-08-28 NOTE — PROGRESS NOTES
"  [facility] - Medical Surgical Unit  Adult Nutrition  Consult Note    SUMMARY     Recommendations    Recommendation/Intervention: 1. Mechanical soft diet with chopped meats and gravy 2. Staff feed pt meals  Goals: Meet EEN >75%,wt maintenance  Nutrition Goal Status: new    Assessment and Plan    No new Assessment & Plan notes have been filed under this hospital service since the last note was generated.  Service: Nutrition       Malnutrition Assessment  Malnutrition Context:  (This pt is high risk for malnutrition R/T multiple medical problems including blindness and intellectual disability.)                                    Reason for Assessment    Reason For Assessment: consult (swing bed pt)  Diagnosis:  (Fall at home, thyroiditis, weakness)  Relevant Medical History: HTN, HLD, Intellectual disability, Blindness, tremor L hand (new)  Interdisciplinary Rounds: attended  General Information Comments: RDN visited pt this a.m.  Staff has been feeding pt meals. Pt is blind. She has a new tremor to her L hand and has poor dentition.  Meal intake % since admit.    Nutrition Risk Screen    Nutrition Risk Screen:  (blindness)    Nutrition/Diet History    Spiritual, Cultural Beliefs, Roman Catholic Practices, Values that Affect Care: no  Food Allergies: NKFA  Factors Affecting Nutritional Intake: chewing difficulties/inability to chew food, impaired cognitive status/motor control, inability to feed self    Anthropometrics    Temp: 98.6 °F (37 °C)  Height Method: Stated  Height: 5' 10" (177.8 cm)  Height (inches): 70 in  Weight Method: Bed Scale  Weight: 78.5 kg (173 lb)  Weight (lb): 173 lb  Ideal Body Weight (IBW), Female: 150 lb  % Ideal Body Weight, Female (lb): 115.33 %  BMI (Calculated): 24.8  BMI Grade: 18.5-24.9 - normal       Lab/Procedures/Meds    Pertinent Labs Comments: Alb 2.4, Hgb 9.9, Hct 29.9  Pertinent Medications Reviewed: reviewed  Pertinent Medications Comments: Lipitor, Vit D, " Pepcid,Fe, B12, Folic acid, Vit C, Macrodantin, Paxil    Physical Findings/Assessment         Estimated/Assessed Needs    Weight Used For Calorie Calculations: 78.5 kg (173 lb)  Energy Calorie Requirements (kcal): 6512-8773  Energy Need Method: Kcal/kg  Protein Requirements: 79-87  Weight Used For Protein Calculations: 78.5 kg (173 lb)     Estimated Fluid Requirement Method: RDA Method  RDA Method (mL): 1961         Nutrition Prescription Ordered    Current Diet Order: Cardiac  Nutrition Order Comments: Needs softer    Evaluation of Received Nutrient/Fluid Intake    Energy Calories Required: meeting needs  Protein Required: meeting needs  Fluid Required: meeting needs  Tolerance:  (Needs softer diet)  % Intake of Estimated Energy Needs: 75 - 100 %  % Meal Intake: 75 - 100 %    Nutrition Risk    Level of Risk/Frequency of Follow-up: moderate - high       Monitor and Evaluation    Food and Nutrient Intake: food and beverage intake  Anthropometric Measurements: weight  Biochemical Data, Medical Tests and Procedures: electrolyte and renal panel  Nutrition-Focused Physical Findings: overall appearance       Nutrition Follow-Up    RD Follow-up?: Yes

## 2023-08-28 NOTE — PLAN OF CARE
Problem: Physical Therapy  Goal: Physical Therapy Goal  Description: Goals to be met by: 2 weeks     Patient will increase functional independence with mobility by performin. Supine to sit with Modified [place]  2. Sit to supine with Modified [place]  3. Sit to stand transfer with Modified [place]  4. Bed to chair transfer with Modified [place] using Rolling Walker  5. Gait  x 500 feet with Modified [place] using Rolling Walker.     Outcome: Ongoing, Progressing

## 2023-08-28 NOTE — PROGRESS NOTES
Nurses Note -- 4 Eyes      8/27/2023   7:40 PM      Skin assessed during: Q Shift Change      [] No Altered Skin Integrity Present    [x]Prevention Measures Documented      [x] Yes- Altered Skin Integrity Present or Discovered   [] LDA Added if Not in Epic (Describe Wound)   [] New Altered Skin Integrity was Present on Admit and Documented in LDA   [] Wound Image Taken    Wound Care Consulted? No    Attending Nurse:  RODGER Anderson RN  Second RN/Staff Member:  YONG Vora RN

## 2023-08-28 NOTE — PLAN OF CARE
Problem: Physical Therapy  Goal: Physical Therapy Goal  Description: Goals to be met by: 2 weeks     Patient will increase functional independence with mobility by performin. Supine to sit with MInimal Assistance  2. Sit to supine with MInimal Assistance  3. Rolling to Left and Right with Stand-by Assistance.  4. Sit to stand transfer with Minimal Assistance  5. Bed to chair transfer with Minimal Assistance using Rolling Walker  6. Gait  x 150 feet with Minimal Assistance using Rolling Walker.    Goals to be met by: 4 weeks     Patient will increase functional independence with mobility by performin. Supine to sit with Stand-by Assistance  2. Sit to supine with Stand-by Assistance  3. Rolling to Left and Right with Stand-by Assistance.  4. Sit to stand transfer with Stand-by Assistance  5. Bed to chair transfer with Stand-by Assistance using Rolling Walker  6. Gait  x 300 feet with Contact Guard Assistance using Rolling Walker.          2023 1252 by Peterson Junior, PT  Outcome: Ongoing, Progressing  2023 1121 by Peterson Junior, PT  Outcome: Ongoing, Progressing

## 2023-08-28 NOTE — PT/OT/SLP EVAL
Physical Therapy Evaluation    Patient Name:  Kenia Carias   MRN:  [id number]    Recommendations:     Discharge Recommendations: home with home health, nursing facility, skilled   Discharge Equipment Recommendations: walker, rolling, wheelchair   Barriers to discharge: Decreased caregiver support    Assessment:     Kenia Carias is a 57 y.o. female admitted with a medical diagnosis of physical deconditioning following elevated Troponin; Left Heart Catheterization on 8/2/23.  She presents with the following impairments/functional limitations: weakness, impaired endurance, impaired self care skills, impaired functional mobility, gait instability, impaired balance, decreased upper extremity function, decreased lower extremity function .    Rehab Prognosis: Fair; patient would benefit from acute skilled PT services to address these deficits and reach maximum level of function.    Recent Surgery: * No surgery found *      Plan:     During this hospitalization, patient to be seen 5 x/week to address the identified rehab impairments via   and progress toward the following goals:    Plan of Care Expires:  09/22/23    Subjective     Chief Complaint: tremors in UE and LE  Patient/Family Comments/goals: to improve strength, safety and mobility.  Pain/Comfort:  Pain Rating 1: 0/10    Patients cultural, spiritual, Baptism conflicts given the current situation: no    Living Environment:  Lives alone but has a caregiver that has moved in and stays with her fulltime.  Prior to admission, patients level of function was independent mobility within household.  Equipment used at home: none.  DME owned (not currently used): none.  Upon discharge, patient will have assistance from to be determined.    Objective:     Communicated with patient  prior to session.  Patient found supine with SCD, bed alarm  upon PT entry to room.    General Precautions: Standard, fall  Orthopedic Precautions:N/A   Braces: N/A  Respiratory Status: Room  air    Exams:  Cognitive Exam:  Patient is oriented to Person and Situation  Gross Motor Coordination:  impaired  RUE ROM: WFL except hands and fingers  RUE Strength: 3-  LUE ROM: WFL except hands and fingers  LUE Strength: 3-  RLE ROM: WFL except ankles  RLE Strength: 3-  LLE ROM: WFL except ankles  LLE Strength: 3-    Functional Mobility:  Bed Mobility:     Rolling Left:  minimum assistance  Rolling Right: minimum assistance  Supine to Sit: moderate assistance  Sit to Supine: moderate assistance  Transfers:     Sit to Stand:  moderate assistance with rolling walker  Gait: 3 feet with RW Mod A  Balance: poor      AM-PAC 6 CLICK MOBILITY  Total Score:14       Treatment & Education:  Plan of care discussed with patient.    Patient left supine with call button in reach.    Case conference with Jasmyn Mohan PTA and Pradeep Richardson PTA and POC reviewed.     GOALS:   Multidisciplinary Problems       Physical Therapy Goals          Problem: Physical Therapy    Goal Priority Disciplines Outcome Goal Variances Interventions   Physical Therapy Goal     PT, PT/OT Ongoing, Progressing     Description:   Description: Goals to be met by: 2 weeks     Patient will increase functional independence with mobility by performin. Supine to sit with MInimal Assistance  2. Sit to supine with MInimal Assistance  3. Rolling to Left and Right with Stand-by Assistance.  4. Sit to stand transfer with Minimal Assistance  5. Bed to chair transfer with Minimal Assistance using Rolling Walker  6. Gait  x 150 feet with Minimal Assistance using Rolling Walker.    Goals to be met by: 4 weeks     Patient will increase functional independence with mobility by performin. Supine to sit with Stand-by Assistance  2. Sit to supine with Stand-by Assistance  3. Rolling to Left and Right with Stand-by Assistance.  4. Sit to stand transfer with Stand-by Assistance  5. Bed to chair transfer with Stand-by Assistance using Rolling Walker  6. Gait  x  300 feet with Contact Guard Assistance using Rolling Walker.                        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

## 2023-08-28 NOTE — PLAN OF CARE
Problem: Occupational Therapy  Goal: Occupational Therapy Goal  Description: Goals to be met by: 9/2923     Patient will increase functional independence with ADLs by performing:    Feeding with Modified [place] and Supervision.  UE Dressing with Modified [place] and Set-up Assistance.  LE Dressing with Modified [place] and Set-up Assistance.  Grooming while seated with Modified [place] and Set-up Assistance.  Toileting from toilet with Contact Guard Assistance and Minimal Assistance for hygiene and clothing management.   Standing standing x3-5 minutes with Contact Guard Assistance.  Stand pivot transfers with Contact Guard Assistance.  Step transfer with Contact Guard Assistance  Toilet transfer to toilet with Contact Guard Assistance.    Outcome: Ongoing, Progressing

## 2023-08-29 LAB — UA COMPLETE W REFLEX CULTURE PNL UR: ABNORMAL

## 2023-08-29 PROCEDURE — 63600175 PHARM REV CODE 636 W HCPCS: Performed by: NURSE PRACTITIONER

## 2023-08-29 PROCEDURE — 97110 THERAPEUTIC EXERCISES: CPT

## 2023-08-29 PROCEDURE — 27000981 HC MATTRESS, ACCUCAIR DAILY RENTAL

## 2023-08-29 PROCEDURE — 11000004 HC SNF PRIVATE

## 2023-08-29 PROCEDURE — 94761 N-INVAS EAR/PLS OXIMETRY MLT: CPT

## 2023-08-29 PROCEDURE — 25000003 PHARM REV CODE 250: Performed by: EMERGENCY MEDICINE

## 2023-08-29 PROCEDURE — 97110 THERAPEUTIC EXERCISES: CPT | Mod: CQ

## 2023-08-29 PROCEDURE — 25000003 PHARM REV CODE 250: Performed by: NURSE PRACTITIONER

## 2023-08-29 PROCEDURE — 27000958

## 2023-08-29 PROCEDURE — 97535 SELF CARE MNGMENT TRAINING: CPT

## 2023-08-29 PROCEDURE — 99900035 HC TECH TIME PER 15 MIN (STAT)

## 2023-08-29 RX ORDER — CEFUROXIME AXETIL 250 MG/1
500 TABLET ORAL EVERY 12 HOURS
Status: DISCONTINUED | OUTPATIENT
Start: 2023-08-29 | End: 2023-09-01 | Stop reason: HOSPADM

## 2023-08-29 RX ORDER — LORAZEPAM 0.5 MG/1
0.5 TABLET ORAL EVERY 6 HOURS PRN
Status: DISCONTINUED | OUTPATIENT
Start: 2023-08-29 | End: 2023-09-01 | Stop reason: HOSPADM

## 2023-08-29 RX ADMIN — FAMOTIDINE 20 MG: 20 TABLET, FILM COATED ORAL at 09:08

## 2023-08-29 RX ADMIN — Medication 1 TABLET: at 09:08

## 2023-08-29 RX ADMIN — Medication 1 CAPSULE: at 06:08

## 2023-08-29 RX ADMIN — METOPROLOL TARTRATE 25 MG: 25 TABLET, FILM COATED ORAL at 09:08

## 2023-08-29 RX ADMIN — CLONAZEPAM 1 MG: 0.5 TABLET ORAL at 09:08

## 2023-08-29 RX ADMIN — PAROXETINE 20 MG: 10 TABLET, FILM COATED ORAL at 09:08

## 2023-08-29 RX ADMIN — Medication 5000 UNITS: at 09:08

## 2023-08-29 RX ADMIN — ENOXAPARIN SODIUM 40 MG: 40 INJECTION SUBCUTANEOUS at 04:08

## 2023-08-29 RX ADMIN — ATORVASTATIN CALCIUM 20 MG: 10 TABLET, FILM COATED ORAL at 09:08

## 2023-08-29 RX ADMIN — LOSARTAN POTASSIUM 25 MG: 25 TABLET, FILM COATED ORAL at 09:08

## 2023-08-29 RX ADMIN — CEFUROXIME AXETIL 500 MG: 250 TABLET, FILM COATED ORAL at 09:08

## 2023-08-29 NOTE — PLAN OF CARE
[facility] - Medical Surgical Unit - Swing Bed   Interdisciplinary Team Meeting    Patient: Kenia Carias   Today's Date: 8/29/2023   Estimated D/C Date: 9/12/23        Physician: Nurse Practitioner: MATI Ansari   Pharmacy:Chintan Fink, Pharm D Unit Director:  FARHEEN Mcintosh   : Meghana Hewitt RN Physical/Occupational Therapy: Kenneth Guerra OT   Speech Therapy: ST Sourav    Nursing: Myesha Hutchinson RN  Respiratory: Ronna Chirinos, Resp. Dietary:  Tommy Urbina, Dietary  Other:      Nurse  New Symptoms/Problems:   Last Bowel Movement: 08/28/23 Urine: incontinent Diarrhea: No   Constipated: No Bowel: incontinent [place]: No   Isolation: No D/C Date:  Date:    O2 Device: Room Air O2 Flow:   SpO2: 98 %   Nutrition: Regular  Speech/Swallowing: No current speech or swallowing issues  Aspiration Precautions: No  Cognition: Memory issues    Physical Therapy  Physical Therapy/Gait: ambulated 3ft with RW at Mod A ELOS: Plan to DC  9/12/23   Transfers: Moderate Assistance Range of Motion/Restrictions:      Occupational Therapy  Eating/Grooming: Supervision or Set-up Assistance Toileting: Moderate Assistance   Bathing: Moderate Assistance Dressing (Upper Body): Moderate Assistance   Dressing (Lower Body): Moderate Assistance        Tx Plan/Recommendations reviewed with family and/or patient on (date) 8/29/23.  Additional family Conference/Training:   D/C Plan/Recommendations: Discharge to SNF  PAWEL: 9/12/23    Pharmacy  Medication Changes (see MD orders in chart): Yes  MD:Hamlet Manzanares D.O. Labs Reviewed: Yes New Lab Orders: Yes   MD/NP Signature:

## 2023-08-29 NOTE — PT/OT/SLP PROGRESS
Occupational Therapy   Treatment    Name: Kenia Carias  MRN: [id number]  Admitting Diagnosis:  Physical deconditioning       Recommendations:     Discharge Recommendations: home with home health, nursing facility, skilled  Discharge Equipment Recommendations:  walker, rolling, wheelchair  Barriers to discharge:       Assessment:     Kenia Carias is a 57 y.o. female with a medical diagnosis of Physical deconditioning; weakness; r/t a fall  She presents with tremors and impulsivity limiting her ADLs and mobility. Performance deficits affecting function are weakness, impaired self care skills, impaired functional mobility, impaired balance, decreased safety awareness, decreased ROM (Blindness). Pt is impulsive likely due to fear secondary to being in a unfamiliarly place with a blind impairment. Pt requires Mod A verbal cues for mobility, t/f and to stay on task. Pt likely has anxiety making it difficult to make progress.     Rehab Prognosis:  Fair; patient would benefit from acute skilled OT services to address these deficits and reach maximum level of function.       Plan:     Patient to be seen 5 x/week to address the above listed problems via self-care/home management, therapeutic activities, therapeutic exercises, neuromuscular re-education  Plan of Care Expires:    Plan of Care Reviewed with: patient    Subjective     Chief Complaint: tremors  Patient/Family Comments/goals: increase ADLs and mobility  Pain/Comfort:       Objective:     Communicated with: Pt prior to session.  Patient found supine with   upon OT entry to room.    General Precautions: Standard, fall    Orthopedic Precautions:   Braces:    Respiratory Status: Room air     Occupational Performance:     Bed Mobility:    Patient completed Sit to Supine with minimum assistance     Functional Mobility/Transfers:  Patient completed Sit <> Stand Transfer with minimum assistance  with  rolling walker   Functional Mobility: Pt was t/f from the therapy gym in the  w/c    Activities of Daily Living:  T/f assessed at Min A for fall prevention and impulsivity      [facility] 6 Click ADL: 14    Treatment & Education:  Therapeutic exercise:    Pt [id number] mins on arm ergometer to increase endurance, BUE strength and activity tolerance for ADL performance  Patient left supine with all lines intact and call button in reach    While sitting in the w/c the pt completed bicep curls 2 sets of 20 reps with 3# DB to increase BUE strength for ADLs and IADLs.    GOALS:   Multidisciplinary Problems       Occupational Therapy Goals          Problem: Occupational Therapy    Goal Priority Disciplines Outcome Interventions   Occupational Therapy Goal     OT, PT/OT Ongoing, Progressing    Description: Goals to be met by: 9/2923     Patient will increase functional independence with ADLs by performing:    Feeding with Modified [place] and Supervision.  UE Dressing with Modified [place] and Set-up Assistance.  LE Dressing with Modified [place] and Set-up Assistance.  Grooming while seated with Modified [place] and Set-up Assistance.  Toileting from toilet with Contact Guard Assistance and Minimal Assistance for hygiene and clothing management.   Standing standing x3-5 minutes with Contact Guard Assistance.  Stand pivot transfers with Contact Guard Assistance.  Step transfer with Contact Guard Assistance  Toilet transfer to toilet with Contact Guard Assistance.                         Time Tracking:     OT Date of Treatment:    OT Start Time:  2:40  OT Stop Time:  3:10  OT Total Time (min):      Billable Minutes:Self Care/Home Management 10  Therapeutic Exercise 20    OT/CHELITA: OT          8/29/2023

## 2023-08-29 NOTE — PROGRESS NOTES
Nurses Note -- 4 Eyes      8/28/2023   7:43 PM      Skin assessed during: Q Shift Change      [] No Altered Skin Integrity Present    [x]Prevention Measures Documented      [x] Yes- Altered Skin Integrity Present or Discovered   [] LDA Added if Not in Epic (Describe Wound)   [] New Altered Skin Integrity was Present on Admit and Documented in LDA   [] Wound Image Taken    Wound Care Consulted? No    Attending Nurse:  Myesha Fung RN/Staff Member:  HORTENCIA KRUEGER

## 2023-08-29 NOTE — NURSING
"Pt became very anxious, tearful, and upset after transfer from BSC to bed. Pt stated "I'm ready to go home". Pt was reassured and was given emotional support. Left pt in a calm state. antolin Johnson notified. Will continue to monitor.            08/29/23 0927   Emotion Mood WDL   Emotion/Mood/Affect [WDL Definition: Calm; euthymic; affect consistent with mood; facial expression relaxed, appropriate to situation] WDL except;emotion mood   Emotion/Mood/Affect Symptoms anxious;fearful;sad;tense       "

## 2023-08-29 NOTE — PLAN OF CARE
Records faxed to [facility] to schedule an appt. With Dr. Fawad Wood, Endocrinologist. Waiting on appt. Date/time for pt.    Spoke with pt. Sister-in-law, Apolonia, and family wants a referral sent to [facility] in [place]. Will send referral to them today per request. Will follow.

## 2023-08-29 NOTE — PT/OT/SLP PROGRESS
Physical Therapy Treatment    Patient Name:  Kenia Carias   MRN:  [id number]    Recommendations:     Discharge Recommendations: home with home health, nursing facility, skilled  Discharge Equipment Recommendations: walker, rolling, wheelchair  Barriers to discharge: Decreased caregiver support    Assessment:     Kenia Carias is a 57 y.o. female admitted with a medical diagnosis of Physical deconditioning.  She presents with the following impairments/functional limitations: weakness, impaired endurance, impaired self care skills, impaired functional mobility, gait instability, impaired balance, visual deficits, impaired cognition, decreased lower extremity function, decreased safety awareness     Patient tolerated treatment fair this afternoon. There was some confusion about where she was and where she was going. PTA ensured that locations were announced as well as to talk to patient to tell her exactly where she was and what she would be doing in therapy. She did exhibit  upper and lower extremity tremors that got worse with movement. Being that the patient is in a new setting, may have contributed in her anxiety. Patient will continue to work towards goals as stated in POC.    Rehab Prognosis: Fair; patient would benefit from acute skilled PT services to address these deficits and reach maximum level of function.    Recent Surgery: * No surgery found *      Plan:     During this hospitalization, patient to be seen 5 x/week to address the identified rehab impairments via   and progress toward the following goals:    Plan of Care Expires:  09/22/23    Subjective     Chief Complaint: UE and LE tremors   Patient/Family Comments/goals: to return home safely  Pain/Comfort:         Objective:     Communicated with nurse prior to session.  Patient found supine with peripheral IV upon PT entry to room.     General Precautions: Standard, fall  Orthopedic Precautions: N/A  Braces: N/A  Respiratory Status: Room air     Functional  Mobility:  Transfers:     Sit to Stand:  minimum assistance and moderate assistance with rolling walker  Bed to Chair: minimum assistance and moderate assistance with  rolling walker  using  Step Transfer      AM-PAC 6 CLICK MOBILITY  Turning over in bed (including adjusting bedclothes, sheets and blankets)?: 3  Sitting down on and standing up from a chair with arms (e.g., wheelchair, bedside commode, etc.): 3  Moving from lying on back to sitting on the side of the bed?: 2  Moving to and from a bed to a chair (including a wheelchair)?: 3  Need to walk in hospital room?: 2  Climbing 3-5 steps with a railing?: 1  Basic Mobility Total Score: 14       Treatment & Education:  Scifit machine x 10 minutes to promote cardiovascular fitness  Seated LAQ, hip flexion, hip adduction, hip abduction, hamstring curls 2 x 10 with yellow thera band     Patient left up in chair with  OT present..    GOALS:   Multidisciplinary Problems       Physical Therapy Goals          Problem: Physical Therapy    Goal Priority Disciplines Outcome Goal Variances Interventions   Physical Therapy Goal     PT, PT/OT Ongoing, Progressing     Description: Goals to be met by: 2 weeks     Patient will increase functional independence with mobility by performin. Supine to sit with MInimal Assistance  2. Sit to supine with MInimal Assistance  3. Rolling to Left and Right with Stand-by Assistance.  4. Sit to stand transfer with Minimal Assistance  5. Bed to chair transfer with Minimal Assistance using Rolling Walker  6. Gait  x 150 feet with Minimal Assistance using Rolling Walker.    Goals to be met by: 4 weeks     Patient will increase functional independence with mobility by performin. Supine to sit with Stand-by Assistance  2. Sit to supine with Stand-by Assistance  3. Rolling to Left and Right with Stand-by Assistance.  4. Sit to stand transfer with Stand-by Assistance  5. Bed to chair transfer with Stand-by Assistance using Rolling  Walker  6. Gait  x 300 feet with Contact Guard Assistance using Rolling Walker.                               Time Tracking:     PT Received On: 08/29/23  PT Start Time: 1355     PT Stop Time: 1435  PT Total Time (min): 40 min     Billable Minutes: Therapeutic Exercise 40    Treatment Type: Treatment  PT/PTA: PTA     Number of PTA visits since last PT visit: 1 08/29/2023

## 2023-08-30 PROCEDURE — 94761 N-INVAS EAR/PLS OXIMETRY MLT: CPT

## 2023-08-30 PROCEDURE — 27000981 HC MATTRESS, ACCUCAIR DAILY RENTAL

## 2023-08-30 PROCEDURE — 86580 TB INTRADERMAL TEST: CPT | Performed by: EMERGENCY MEDICINE

## 2023-08-30 PROCEDURE — 11000004 HC SNF PRIVATE

## 2023-08-30 PROCEDURE — 25000003 PHARM REV CODE 250: Performed by: EMERGENCY MEDICINE

## 2023-08-30 PROCEDURE — 30200315 PPD INTRADERMAL TEST REV CODE 302: Performed by: EMERGENCY MEDICINE

## 2023-08-30 PROCEDURE — 97535 SELF CARE MNGMENT TRAINING: CPT

## 2023-08-30 PROCEDURE — 25000003 PHARM REV CODE 250: Performed by: NURSE PRACTITIONER

## 2023-08-30 PROCEDURE — 97110 THERAPEUTIC EXERCISES: CPT

## 2023-08-30 PROCEDURE — 97110 THERAPEUTIC EXERCISES: CPT | Mod: CQ

## 2023-08-30 PROCEDURE — 27000958

## 2023-08-30 PROCEDURE — 63600175 PHARM REV CODE 636 W HCPCS: Performed by: NURSE PRACTITIONER

## 2023-08-30 RX ADMIN — Medication 1 CAPSULE: at 05:08

## 2023-08-30 RX ADMIN — METOPROLOL TARTRATE 25 MG: 25 TABLET, FILM COATED ORAL at 09:08

## 2023-08-30 RX ADMIN — Medication 1 TABLET: at 09:08

## 2023-08-30 RX ADMIN — FAMOTIDINE 20 MG: 20 TABLET, FILM COATED ORAL at 09:08

## 2023-08-30 RX ADMIN — CEFUROXIME AXETIL 500 MG: 250 TABLET, FILM COATED ORAL at 09:08

## 2023-08-30 RX ADMIN — ATORVASTATIN CALCIUM 20 MG: 10 TABLET, FILM COATED ORAL at 09:08

## 2023-08-30 RX ADMIN — TUBERCULIN PURIFIED PROTEIN DERIVATIVE 5 UNITS: 5 INJECTION, SOLUTION INTRADERMAL at 04:08

## 2023-08-30 RX ADMIN — ENOXAPARIN SODIUM 40 MG: 40 INJECTION SUBCUTANEOUS at 05:08

## 2023-08-30 RX ADMIN — Medication 5000 UNITS: at 09:08

## 2023-08-30 RX ADMIN — PAROXETINE 20 MG: 10 TABLET, FILM COATED ORAL at 09:08

## 2023-08-30 RX ADMIN — LOSARTAN POTASSIUM 25 MG: 25 TABLET, FILM COATED ORAL at 09:08

## 2023-08-30 NOTE — PT/OT/SLP PROGRESS
Occupational Therapy   Treatment    Name: Kenia Carias  MRN: [id number]  Admitting Diagnosis:  Physical deconditioning       Recommendations:     Discharge Recommendations: home with home health, nursing facility, skilled  Discharge Equipment Recommendations:  walker, rolling, wheelchair  Barriers to discharge:       Assessment:     Kenia Carias is a 57 y.o. female with a medical diagnosis of Physical deconditioning; weakness; r/t a fall  She presents with tremors and impulsivity limiting her ADLs and mobility. Performance deficits affecting function are weakness, impaired self care skills, impaired functional mobility, impaired balance, decreased safety awareness, decreased ROM (Blindness). Pt had a better session on today and she was able to participate with better confidence. Pt completed t/f and functional ambulation with Min A for balance and safety.    Rehab Prognosis:  Fair; patient would benefit from acute skilled OT services to address these deficits and reach maximum level of function.       Plan:     Patient to be seen 5 x/week to address the above listed problems via self-care/home management, therapeutic activities, therapeutic exercises, neuromuscular re-education  Plan of Care Expires:    Plan of Care Reviewed with: patient    Subjective     Chief Complaint: tremors  Patient/Family Comments/goals: increase ADLs and mobility  Pain/Comfort:       Objective:     Communicated with: Pt prior to session.  Patient found supine with   upon OT entry to room.    General Precautions: Standard, fall    Orthopedic Precautions:   Braces:    Respiratory Status: Room air     Occupational Performance:     Bed Mobility:    Patient completed Sit to Supine with minimum assistance     Functional Mobility/Transfers:  Patient completed Sit <> Stand Transfer with minimum assistance  with  rolling walker   Functional Mobility: Pt was t/f from the therapy gym in the w/c. Pt completed functional  ambulation down the hallway with OT  providing assistance with device management and safety     Activities of Daily Living:  T/f assessed at Min A for fall prevention and impulsivity  Using the parallel bars, the pt completed weight shifting in place to increase confidence with ambulation and to promote safety      [facility] 6 Click ADL: 14    Treatment & Education:  Therapeutic exercise:    Pt [id number] mins on Sci Fit to increase endurance, BUE strength and activity tolerance for ADL performance  Patient left supine with all lines intact and call button in reach    While sitting in the w/c the pt completed bicep curls 2 sets of 20 reps with 3# DB to increase BUE strength for ADLs and IADLs.    GOALS:   Multidisciplinary Problems       Occupational Therapy Goals          Problem: Occupational Therapy    Goal Priority Disciplines Outcome Interventions   Occupational Therapy Goal     OT, PT/OT Ongoing, Progressing    Description: Goals to be met by: 9/2923     Patient will increase functional independence with ADLs by performing:    Feeding with Modified [place] and Supervision.  UE Dressing with Modified [place] and Set-up Assistance.  LE Dressing with Modified [place] and Set-up Assistance.  Grooming while seated with Modified [place] and Set-up Assistance.  Toileting from toilet with Contact Guard Assistance and Minimal Assistance for hygiene and clothing management.   Standing standing x3-5 minutes with Contact Guard Assistance.  Stand pivot transfers with Contact Guard Assistance.  Step transfer with Contact Guard Assistance  Toilet transfer to toilet with Contact Guard Assistance.                         Time Tracking:     OT Date of Treatment:    OT Start Time:  3:30  OT Stop Time:  4:10  OT Total Time (min):      Billable Minutes:Self Care/Home Management 15  Therapeutic Exercise 15    OT/CHELITA: OT        Pt was tx concurrently with PT'Adue to pt's previous anxiety, limited activity tolerance,  and impulsivity r/t visual  impairment. .     8/30/2023

## 2023-08-30 NOTE — PLAN OF CARE
Referral sent yesterday to [facility]. Received phone call today from Barbara at their facility and they will be able to accept pt. On Friday. Pt. Brother and sister-in-law are going to be transporting pt. On Friday to the NH there. TB skin test was ordered today. Will follow.

## 2023-08-30 NOTE — PT/OT/SLP PROGRESS
Physical Therapy Treatment    Patient Name:  Kenia Carias   MRN:  [id number]    Recommendations:     Discharge Recommendations: home with home health, nursing facility, skilled  Discharge Equipment Recommendations: walker, rolling, wheelchair  Barriers to discharge: Decreased caregiver support    Assessment:     Kenia Carias is a 57 y.o. female admitted with a medical diagnosis of Physical deconditioning.  She presents with the following impairments/functional limitations: weakness, impaired endurance, impaired self care skills, impaired functional mobility, gait instability, impaired balance, visual deficits, impaired cognition, decreased coordination, decreased lower extremity function, decreased safety awareness     Patient tolerated treatment well this afternoon. She did better than she did yesterday with treatment. She was co treated today with OT for previous anxiety and impulsivity during previous treatment, which had optimal results. She was very fatigue after treatment, but was okay to be taken back to room. Nurse was notified and patient was left with her in room. Patient will continue to progress towards goals as stated in POC.    Rehab Prognosis: Fair; patient would benefit from acute skilled PT services to address these deficits and reach maximum level of function.    Recent Surgery: * No surgery found *      Plan:     During this hospitalization, patient to be seen 5 x/week to address the identified rehab impairments via gait training, therapeutic activities, therapeutic exercises, neuromuscular re-education and progress toward the following goals:    Plan of Care Expires:  09/22/23    Subjective     Chief Complaint: UE and LE tremors   Patient/Family Comments/goals: to return home safely  Pain/Comfort:  Pain Rating 1: 0/10      Objective:     Communicated with nurse prior to session.  Patient found supine with peripheral IV upon PT entry to room.     General Precautions: Standard, fall, blind  Orthopedic  Precautions: N/A  Braces: N/A  Respiratory Status: Room air     Functional Mobility:  Transfers:     Sit to Stand:  minimum assistance and moderate assistance with rolling walker  Bed to Chair: minimum assistance and moderate assistance with  rolling walker  using  Step Transfer  Gait: 60ft with RW CGA with cueing to ambulate in a straight line       AM-PAC 6 CLICK MOBILITY  Turning over in bed (including adjusting bedclothes, sheets and blankets)?: 3  Sitting down on and standing up from a chair with arms (e.g., wheelchair, bedside commode, etc.): 3  Moving from lying on back to sitting on the side of the bed?: 2  Moving to and from a bed to a chair (including a wheelchair)?: 3  Need to walk in hospital room?: 2  Climbing 3-5 steps with a railing?: 1  Basic Mobility Total Score: 14       Treatment & Education:  Standing Heel raises in // bars 3 x 10 with rest breaks   Bilateral Standing straight leg raises  in // bars 3 x 10       Patient left up in chair with  OT present..    GOALS:   Multidisciplinary Problems       Physical Therapy Goals          Problem: Physical Therapy    Goal Priority Disciplines Outcome Goal Variances Interventions   Physical Therapy Goal     PT, PT/OT Ongoing, Progressing     Description: Goals to be met by: 2 weeks     Patient will increase functional independence with mobility by performin. Supine to sit with MInimal Assistance  2. Sit to supine with MInimal Assistance  3. Rolling to Left and Right with Stand-by Assistance.  4. Sit to stand transfer with Minimal Assistance  5. Bed to chair transfer with Minimal Assistance using Rolling Walker  6. Gait  x 150 feet with Minimal Assistance using Rolling Walker.    Goals to be met by: 4 weeks     Patient will increase functional independence with mobility by performin. Supine to sit with Stand-by Assistance  2. Sit to supine with Stand-by Assistance  3. Rolling to Left and Right with Stand-by Assistance.  4. Sit to stand  transfer with Stand-by Assistance  5. Bed to chair transfer with Stand-by Assistance using Rolling Walker  6. Gait  x 300 feet with Contact Guard Assistance using Rolling Walker.                               Time Tracking:     PT Received On: 08/30/23  PT Start Time: 1530     PT Stop Time: 1610  PT Total Time (min): 40 minutes    Billable Minutes: Therapeutic Exercise 20    Treatment Type: Treatment  PT/PTA: PTA     Number of PTA visits since last PT visit: 2     08/30/2023

## 2023-08-30 NOTE — PROGRESS NOTES
Nurses Note -- 4 Eyes      8/29/2023   7:37 PM      Skin assessed during: Q Shift Change      [] No Altered Skin Integrity Present    [x]Prevention Measures Documented      [x] Yes- Altered Skin Integrity Present or Discovered   [] LDA Added if Not in Epic (Describe Wound)   [] New Altered Skin Integrity was Present on Admit and Documented in LDA   [] Wound Image Taken    Wound Care Consulted? No    Attending Nurse:  Myesha Fung RN/Staff Member:  HORTENCIA KRUEGER

## 2023-08-30 NOTE — PROGRESS NOTES
Last Appointment:  8/6/2020  Future Appointments   Date Time Provider Sandra Nona   9/2/2020  9:45 AM [facility]   9/10/2020  2:00 PM MD Wilbur Vizcarra [facility] RDN changed diet to finger foods after consulting with [facility] so pt can feed herself.  Will F/U upon return to facility.

## 2023-08-31 PROCEDURE — 25000003 PHARM REV CODE 250: Performed by: NURSE PRACTITIONER

## 2023-08-31 PROCEDURE — 97110 THERAPEUTIC EXERCISES: CPT | Mod: CQ

## 2023-08-31 PROCEDURE — 27000981 HC MATTRESS, ACCUCAIR DAILY RENTAL

## 2023-08-31 PROCEDURE — 97535 SELF CARE MNGMENT TRAINING: CPT

## 2023-08-31 PROCEDURE — 11000004 HC SNF PRIVATE

## 2023-08-31 PROCEDURE — 97110 THERAPEUTIC EXERCISES: CPT

## 2023-08-31 PROCEDURE — 27000958

## 2023-08-31 PROCEDURE — 25000003 PHARM REV CODE 250: Performed by: EMERGENCY MEDICINE

## 2023-08-31 PROCEDURE — 94761 N-INVAS EAR/PLS OXIMETRY MLT: CPT

## 2023-08-31 PROCEDURE — 63600175 PHARM REV CODE 636 W HCPCS: Performed by: NURSE PRACTITIONER

## 2023-08-31 PROCEDURE — 97112 NEUROMUSCULAR REEDUCATION: CPT | Mod: CQ

## 2023-08-31 RX ADMIN — Medication 1 CAPSULE: at 06:08

## 2023-08-31 RX ADMIN — Medication 1 TABLET: at 09:08

## 2023-08-31 RX ADMIN — FAMOTIDINE 20 MG: 20 TABLET, FILM COATED ORAL at 09:08

## 2023-08-31 RX ADMIN — PAROXETINE 20 MG: 10 TABLET, FILM COATED ORAL at 09:08

## 2023-08-31 RX ADMIN — ATORVASTATIN CALCIUM 20 MG: 10 TABLET, FILM COATED ORAL at 09:08

## 2023-08-31 RX ADMIN — Medication 5000 UNITS: at 09:08

## 2023-08-31 RX ADMIN — CEFUROXIME AXETIL 500 MG: 250 TABLET, FILM COATED ORAL at 09:08

## 2023-08-31 RX ADMIN — LOSARTAN POTASSIUM 25 MG: 25 TABLET, FILM COATED ORAL at 09:08

## 2023-08-31 RX ADMIN — ENOXAPARIN SODIUM 40 MG: 40 INJECTION SUBCUTANEOUS at 05:08

## 2023-08-31 RX ADMIN — METOPROLOL TARTRATE 25 MG: 25 TABLET, FILM COATED ORAL at 09:08

## 2023-08-31 NOTE — PT/OT/SLP PROGRESS
Occupational Therapy   Treatment    Name: Kenia Carias  MRN: [id number]  Admitting Diagnosis:  Physical deconditioning       Recommendations:     Discharge Recommendations: home with home health, nursing facility, skilled  Discharge Equipment Recommendations:  walker, rolling, wheelchair  Barriers to discharge:       Assessment:     Kenia Carias is a 57 y.o. female with a medical diagnosis of Physical deconditioning; weakness; r/t a fall  She presents with tremors and impulsivity limiting her ADLs and mobility. Performance deficits affecting function are weakness, impaired self care skills, impaired functional mobility, impaired balance, decreased safety awareness, decreased ROM (Blindness). Pt had a better session on today and she was able to participate with better confidence. Pt completed t/f and functional ambulation with Min A for balance, device management, navigation and safety. Pt is planning to d/c to a SNF. Pt was co treated with PTA    Rehab Prognosis:  Fair; patient would benefit from acute skilled OT services to address these deficits and reach maximum level of function.       Plan:     Patient to be seen 5 x/week to address the above listed problems via self-care/home management, therapeutic activities, therapeutic exercises, neuromuscular re-education  Plan of Care Expires:    Plan of Care Reviewed with: patient    Subjective     Chief Complaint: tremors  Patient/Family Comments/goals: increase ADLs and mobility  Pain/Comfort:       Objective:     Communicated with: Pt prior to session.  Patient found supine with   upon OT entry to room.    General Precautions: Standard, fall    Orthopedic Precautions:   Braces:    Respiratory Status: Room air     Occupational Performance:     Bed Mobility:    Patient completed Sit to Supine with minimum assistance     Functional Mobility/Transfers:  Patient completed Sit <> Stand Transfer with minimum assistance  with  rolling walker   Functional Mobility: Pt was t/f  from the therapy gym in the w/c. Pt completed functional  ambulation down the hallway with OT providing assistance with device management and safety.    Activities of Daily Living:  T/f assessed at Min A for fall prevention and impulsivity  Using the RW the pt completed 2 sets of 10 reps of sit to stand t/f  to increase balance and confidence with t/f.  LE dressing: SVN after being oriented on front and back by OT  UE dressing with setup      AMPAC 6 Click ADL: 14    Treatment & Education:  Therapeutic exercise:    Pt [id number] mins on Sci Fit to increase endurance, BUE strength and activity tolerance for ADL performance  Patient left supine with all lines intact and call button in reach    While sitting in the w/c the pt completed bicep curls 2 sets of 20 reps with 3# DB; chest press 2 sets of 20 reps with 5# DB to  to increase BUE strength for ADLs and IADLs.    GOALS:   Multidisciplinary Problems       Occupational Therapy Goals          Problem: Occupational Therapy    Goal Priority Disciplines Outcome Interventions   Occupational Therapy Goal     OT, PT/OT Ongoing, Progressing    Description: Goals to be met by: 9/2923     Patient will increase functional independence with ADLs by performing:    Feeding with Modified [place] and Supervision.  UE Dressing with Modified [place] and Set-up Assistance.  LE Dressing with Modified [place] and Set-up Assistance.  Grooming while seated with Modified [place] and Set-up Assistance.  Toileting from toilet with Contact Guard Assistance and Minimal Assistance for hygiene and clothing management.   Standing standing x3-5 minutes with Contact Guard Assistance.  Stand pivot transfers with Contact Guard Assistance.  Step transfer with Contact Guard Assistance  Toilet transfer to toilet with Contact Guard Assistance.                         Time Tracking:     OT Date of Treatment:    OT Start Time:  3:10  OT Stop Time:  3:55  OT Total Time (min):       Billable Minutes:Self Care/Home Management 15  Therapeutic Exercise 15      OT/CHELITA: OT        8/31/2023

## 2023-08-31 NOTE — PT/OT/SLP PROGRESS
Physical Therapy Treatment    Patient Name:  Kenia Carias   MRN:  [id number]    Recommendations:     Discharge Recommendations: assisted living facility  Discharge Equipment Recommendations: walker, rolling, wheelchair  Barriers to discharge: Decreased caregiver support    Assessment:     Kenia Carias is a 57 y.o. female admitted with a medical diagnosis of Physical deconditioning.  She presents with the following impairments/functional limitations: weakness, impaired endurance, impaired self care skills, impaired functional mobility, gait instability, impaired balance, impaired cognition, visual deficits, decreased coordination, decreased lower extremity function, decreased safety awareness     Patient tolerated treatment well today with no complaints of pain. She does fatigued and hot during treatment resulting in her sweating. She has improved greatly with gait mechanics, but still needs tactile cueing and device management from therapist.  She was co treated today with OT for optimal therapy outcomes and participation. She has improved in endurance training on Scifit as well taking less rest breaks. She is continuing to progress towards her goals.    Rehab Prognosis: Fair; patient would benefit from acute skilled PT services to address these deficits and reach maximum level of function.    Recent Surgery: * No surgery found *      Plan:     During this hospitalization, patient to be seen 5 x/week to address the identified rehab impairments via gait training, therapeutic activities, therapeutic exercises, neuromuscular re-education and progress toward the following goals:    Plan of Care Expires:  09/22/23    Subjective     Chief Complaint: Anxiety  Patient/Family Comments/goals: to return to prior level of function  Pain/Comfort:  Pain Rating 1: 0/10      Objective:     Communicated with OT prior to session.  Patient found supine with SCD upon PT entry to room.     General Precautions: Standard, fall,  blind  Orthopedic Precautions: N/A  Braces: N/A  Respiratory Status: Room air     Functional Mobility:  Transfers:     Sit to Stand:  stand by assistance and contact guard assistance with rolling walker      AM-PAC 6 CLICK MOBILITY  Turning over in bed (including adjusting bedclothes, sheets and blankets)?: 3  Sitting down on and standing up from a chair with arms (e.g., wheelchair, bedside commode, etc.): 3  Moving from lying on back to sitting on the side of the bed?: 3  Moving to and from a bed to a chair (including a wheelchair)?: 3  Need to walk in hospital room?: 2  Climbing 3-5 steps with a railing?: 1  Basic Mobility Total Score: 15       Treatment & Education:  Scifit machine x 10 minutes to improve cardiovascular fitness     Neuro Reeducation:  Standing Heel raises 3 x 10 on each LE  Standing hip flexion 3 x 10      Patient left HOB elevated with all lines intact, call button in reach, and bed alarm on..    GOALS:   Multidisciplinary Problems       Physical Therapy Goals          Problem: Physical Therapy    Goal Priority Disciplines Outcome Goal Variances Interventions   Physical Therapy Goal     PT, PT/OT Ongoing, Progressing     Description: Goals to be met by: 2 weeks     Patient will increase functional independence with mobility by performin. Supine to sit with MInimal Assistance  2. Sit to supine with MInimal Assistance  3. Rolling to Left and Right with Stand-by Assistance.  4. Sit to stand transfer with Minimal Assistance  5. Bed to chair transfer with Minimal Assistance using Rolling Walker  6. Gait  x 150 feet with Minimal Assistance using Rolling Walker.    Goals to be met by: 4 weeks     Patient will increase functional independence with mobility by performin. Supine to sit with Stand-by Assistance  2. Sit to supine with Stand-by Assistance  3. Rolling to Left and Right with Stand-by Assistance.  4. Sit to stand transfer with Stand-by Assistance  5. Bed to chair transfer with  Stand-by Assistance using Rolling Walker  6. Gait  x 300 feet with Contact Guard Assistance using Rolling Walker.                               Time Tracking:     PT Received On: 08/31/23  PT Start Time: 1508     PT Stop Time: 1601  PT Total Time (min): 53 min     Billable Minutes: Therapeutic Exercise 10 and Neuromuscular Re-education 13    Treatment Type: Treatment  PT/PTA: PTA     Number of PTA visits since last PT visit: 4     08/31/2023

## 2023-09-01 VITALS
DIASTOLIC BLOOD PRESSURE: 75 MMHG | RESPIRATION RATE: 18 BRPM | BODY MASS INDEX: 24.34 KG/M2 | TEMPERATURE: 98 F | SYSTOLIC BLOOD PRESSURE: 123 MMHG | WEIGHT: 170 LBS | HEART RATE: 70 BPM | HEIGHT: 70 IN | OXYGEN SATURATION: 97 %

## 2023-09-01 PROBLEM — F33.9 EPISODE OF RECURRENT MAJOR DEPRESSIVE DISORDER: Status: ACTIVE | Noted: 2023-09-01

## 2023-09-01 PROBLEM — F41.9 ANXIETY: Status: ACTIVE | Noted: 2023-09-01

## 2023-09-01 PROBLEM — M62.81 MUSCLE WEAKNESS: Status: ACTIVE | Noted: 2023-09-01

## 2023-09-01 PROBLEM — N30.00 ACUTE CYSTITIS WITHOUT HEMATURIA: Status: ACTIVE | Noted: 2023-09-01

## 2023-09-01 PROBLEM — I10 PRIMARY HYPERTENSION: Status: ACTIVE | Noted: 2023-09-01

## 2023-09-01 LAB
ANION GAP SERPL CALCULATED.3IONS-SCNC: 10 MMOL/L (ref 7–16)
BASOPHILS # BLD AUTO: 0.01 K/UL (ref 0–0.2)
BASOPHILS NFR BLD AUTO: 0.2 % (ref 0–1)
BUN SERPL-MCNC: 6 MG/DL (ref 7–18)
BUN/CREAT SERPL: 11 (ref 6–20)
CALCIUM SERPL-MCNC: 8.8 MG/DL (ref 8.5–10.1)
CHLORIDE SERPL-SCNC: 108 MMOL/L (ref 98–107)
CO2 SERPL-SCNC: 29 MMOL/L (ref 21–32)
CREAT SERPL-MCNC: 0.57 MG/DL (ref 0.55–1.02)
DIFFERENTIAL METHOD BLD: ABNORMAL
EGFR (NO RACE VARIABLE) (RUSH/TITUS): 107 ML/MIN/1.73M2
EOSINOPHIL # BLD AUTO: 0.18 K/UL (ref 0–0.5)
EOSINOPHIL NFR BLD AUTO: 4 % (ref 1–4)
ERYTHROCYTE [DISTWIDTH] IN BLOOD BY AUTOMATED COUNT: 14.8 % (ref 11.5–14.5)
GLUCOSE SERPL-MCNC: 102 MG/DL (ref 74–106)
HCT VFR BLD AUTO: 30.5 % (ref 38–47)
HGB BLD-MCNC: 9.6 G/DL (ref 12–16)
LYMPHOCYTES # BLD AUTO: 1.08 K/UL (ref 1–4.8)
LYMPHOCYTES NFR BLD AUTO: 24.3 % (ref 27–41)
MCH RBC QN AUTO: 25.3 PG (ref 27–31)
MCHC RBC AUTO-ENTMCNC: 31.5 G/DL (ref 32–36)
MCV RBC AUTO: 80.3 FL (ref 80–96)
MONOCYTES # BLD AUTO: 0.44 K/UL (ref 0–0.8)
MONOCYTES NFR BLD AUTO: 9.9 % (ref 2–6)
MPC BLD CALC-MCNC: 10.9 FL (ref 9.4–12.4)
NEUTROPHILS # BLD AUTO: 2.74 K/UL (ref 1.8–7.7)
NEUTROPHILS NFR BLD AUTO: 61.6 % (ref 53–65)
PLATELET # BLD AUTO: 403 K/UL (ref 150–400)
POTASSIUM SERPL-SCNC: 3.5 MMOL/L (ref 3.5–5.1)
RBC # BLD AUTO: 3.8 M/UL (ref 4.2–5.4)
SODIUM SERPL-SCNC: 143 MMOL/L (ref 136–145)
WBC # BLD AUTO: 4.45 K/UL (ref 4.5–11)

## 2023-09-01 PROCEDURE — 25000003 PHARM REV CODE 250: Performed by: NURSE PRACTITIONER

## 2023-09-01 PROCEDURE — 99316 PR NURSING FAC DISCHRGE DAY,MORE 30 MIN: ICD-10-PCS | Mod: ,,, | Performed by: EMERGENCY MEDICINE

## 2023-09-01 PROCEDURE — 80048 BASIC METABOLIC PNL TOTAL CA: CPT | Performed by: NURSE PRACTITIONER

## 2023-09-01 PROCEDURE — 94761 N-INVAS EAR/PLS OXIMETRY MLT: CPT

## 2023-09-01 PROCEDURE — 99316 NF DSCHRG MGMT 30 MIN+: CPT | Mod: ,,, | Performed by: EMERGENCY MEDICINE

## 2023-09-01 PROCEDURE — 25000003 PHARM REV CODE 250: Performed by: EMERGENCY MEDICINE

## 2023-09-01 PROCEDURE — 85025 COMPLETE CBC W/AUTO DIFF WBC: CPT | Performed by: NURSE PRACTITIONER

## 2023-09-01 RX ORDER — ERGOCALCIFEROL 1.25 MG/1
50000 CAPSULE ORAL
Qty: 8 CAPSULE | Refills: 0 | Status: SHIPPED | OUTPATIENT
Start: 2023-09-01 | End: 2023-10-21

## 2023-09-01 RX ORDER — FERROUS SULFATE 325(65) MG
325 TABLET ORAL DAILY
Qty: 30 TABLET | Refills: 0 | Status: SHIPPED | OUTPATIENT
Start: 2023-09-01 | End: 2023-10-01

## 2023-09-01 RX ORDER — FUROSEMIDE 20 MG/1
20 TABLET ORAL DAILY PRN
Qty: 30 TABLET | Refills: 0
Start: 2023-09-01 | End: 2023-10-01

## 2023-09-01 RX ORDER — CLONAZEPAM 1 MG/1
1 TABLET ORAL NIGHTLY PRN
Qty: 30 TABLET | Refills: 0 | Status: SHIPPED | OUTPATIENT
Start: 2023-09-01 | End: 2023-10-01

## 2023-09-01 RX ORDER — CEFUROXIME AXETIL 500 MG/1
500 TABLET ORAL EVERY 12 HOURS
Qty: 8 TABLET | Refills: 0 | Status: SHIPPED | OUTPATIENT
Start: 2023-09-01 | End: 2023-09-05

## 2023-09-01 RX ORDER — FAMOTIDINE 20 MG/1
20 TABLET, FILM COATED ORAL 2 TIMES DAILY
Qty: 60 TABLET | Refills: 0 | Status: SHIPPED | OUTPATIENT
Start: 2023-09-01 | End: 2023-10-01

## 2023-09-01 RX ADMIN — FAMOTIDINE 20 MG: 20 TABLET, FILM COATED ORAL at 09:09

## 2023-09-01 RX ADMIN — Medication 1 TABLET: at 09:09

## 2023-09-01 RX ADMIN — PAROXETINE 20 MG: 10 TABLET, FILM COATED ORAL at 09:09

## 2023-09-01 RX ADMIN — METOPROLOL TARTRATE 25 MG: 25 TABLET, FILM COATED ORAL at 09:09

## 2023-09-01 RX ADMIN — Medication 1 CAPSULE: at 06:09

## 2023-09-01 RX ADMIN — LOSARTAN POTASSIUM 25 MG: 25 TABLET, FILM COATED ORAL at 09:09

## 2023-09-01 RX ADMIN — CEFUROXIME AXETIL 500 MG: 250 TABLET, FILM COATED ORAL at 09:09

## 2023-09-01 RX ADMIN — Medication 5000 UNITS: at 09:09

## 2023-09-01 NOTE — NURSING
1125 d/c instructions reviewed with patient brother. D/c package put together.     1130 patient left facility per private vehicle.     1135 attempt to call report. Wrong phone number given.     1155 received right phone number to call report.     1200 report given to deanne parker at facility

## 2023-09-01 NOTE — PLAN OF CARE
Problem: Adult Inpatient Plan of Care  Goal: Plan of Care Review  Outcome: Ongoing, Progressing  Goal: Patient-Specific Goal (Individualized)  Outcome: Ongoing, Progressing  Goal: Absence of Hospital-Acquired Illness or Injury  Outcome: Ongoing, Progressing  Goal: Optimal Comfort and Wellbeing  Outcome: Ongoing, Progressing  Goal: Readiness for Transition of Care  Outcome: Ongoing, Progressing     Problem: Fall Injury Risk  Goal: Absence of Fall and Fall-Related Injury  Outcome: Ongoing, Progressing     Problem: Hypertension Comorbidity  Goal: Blood Pressure in Desired Range  Outcome: Ongoing, Progressing     Problem: Skin Injury Risk Increased  Goal: Skin Health and Integrity  Outcome: Ongoing, Progressing

## 2023-09-01 NOTE — DISCHARGE INSTRUCTIONS
PT WAS ADVISED  THAT SHE IS BEING DISCHARGED TO [facility] IN [place]. PT WAS ADVISED TO INCREASE REST AND FLUIDS. PT WAS ADVISED TO TAKE MEDICATIONS AS PRESCRIBED. PT WAS ADVISED TO TAKE MIRALAX DAILY TO PREVENT CONSTIPATION, TYLENOL FOR PAIN AND MELATONIN FOR SLEEP. PT WAS ADVISED REGARDING FALL PRECAUTIONS. PT WAS ADVISED REGARDING BENZODIAZEPINES ( MEDICATIONS FOR ANXIETY). PT WAS ADVISED THAT [facility] WILL BE UP TO NURSING HOME PROVIDER  PT WAS ADVISED THAT WE WILL CALL NEXT WEEK REGARDING APPOINTMENT WITH ENDOCRINOLOGY.

## 2023-09-01 NOTE — PLAN OF CARE
[facility] - Medical Surgical Unit  Discharge Final Note    Primary Care Provider: Elaine Clark MD    Expected Discharge Date: 9/1/2023    Final Discharge Note (most recent)       Final Note - 09/01/23 0950          Final Note    Assessment Type Final Discharge Note     Anticipated Discharge Disposition Long Term Care   [facility]    What phone number can be called within the next 1-3 days to see how you are doing after discharge? [phone]     Hospital Resources/Appts/Education Provided Provided patient/caregiver with written discharge plan information;Appointments scheduled and added to AVS        Post-Acute Status    Post-Acute Authorization Placement     Post-Acute Placement Status Patient List Provided     Coverage Medicare     Patient choice form signed by patient/caregiver List with quality metrics by geographic area provided;List from CMS Compare;List from System Post-Acute Care     Discharge Delays None known at this time                 Pt. Has been accepted and will d/c to [facility] today. Pt. Brother will be coming to transport pt. To the NH. Appt. Is still pending with [facility] to see Dr. Fawad Wood. I will call the NH when appt. Has been scheduled. No other d/c needs were identified.    Important Message from Medicare  Important Message from Medicare regarding Discharge Appeal Rights: Given to patient/caregiver, Explained to patient/caregiver, Signed/date by patient/caregiver     Date IMM was signed: 08/30/23  Time IMM was signed: 1300

## 2023-09-01 NOTE — NURSING
Nurses Note -- 4 Eyes      9/1/2023   0710 AM      Skin assessed during: Q Shift Change      [x] No Altered Skin Integrity Present    []Prevention Measures Documented      [] Yes- Altered Skin Integrity Present or Discovered   [] LDA Added if Not in Epic (Describe Wound)   [] New Altered Skin Integrity was Present on Admit and Documented in LDA   [] Wound Image Taken    Wound Care Consulted? No    Attending Nurse:  Cat Fung RN/Staff Member:  MATT Urbina

## 2023-09-01 NOTE — PLAN OF CARE
Problem: Adult Inpatient Plan of Care  Goal: Plan of Care Review  Outcome: Met  Goal: Patient-Specific Goal (Individualized)  Outcome: Met  Goal: Absence of Hospital-Acquired Illness or Injury  Outcome: Met  Goal: Optimal Comfort and Wellbeing  Outcome: Met  Goal: Readiness for Transition of Care  Outcome: Met     Problem: Fall Injury Risk  Goal: Absence of Fall and Fall-Related Injury  Outcome: Met     Problem: Hypertension Comorbidity  Goal: Blood Pressure in Desired Range  Outcome: Met     Problem: Skin Injury Risk Increased  Goal: Skin Health and Integrity  Outcome: Met     Problem: Impaired Wound Healing  Goal: Optimal Wound Healing  Outcome: Met

## 2023-09-21 NOTE — PLAN OF CARE
9/21/23 2:28 pm Received phone call from [facility] in [place] and appt. Has been scheduled for pt. To see Dr. Fawad Wood on 11/1/23 at 9:30am. Called and was able to speak with pt. Sister-in-law, Apolonia Carias, to relay appointment information and she was concerned that the NH would not be able to transport pt. To the appointment.    Called and spoke to Barbara at [facility] in [place] to notify them of appointment date and time for pt. Voiced understanding and made note of upcoming date and time given.

## 2023-10-05 PROBLEM — E55.9 VITAMIN D DEFICIENCY: Status: ACTIVE | Noted: 2023-10-05

## 2023-10-05 PROBLEM — D51.0 VITAMIN B12 DEFICIENCY ANEMIA DUE TO INTRINSIC FACTOR DEFICIENCY: Status: ACTIVE | Noted: 2023-10-05

## 2023-10-05 PROBLEM — R53.81 PHYSICAL DECONDITIONING: Status: RESOLVED | Noted: 2023-08-25 | Resolved: 2023-10-05

## 2023-10-05 NOTE — HOSPITAL COURSE
09/01/2023 HOSPITAL DAY   PT IS A 57 YR OLD BF ADMITTED TO [facility] FOR PT/OT/REHABILITATION AFTER ADMISSION TO [facility]  WITH N STEMI WITH LHC REVEALING NO STENOSIS OR BLOCKAGE WITH EF 55% AND ELEVATED D DIMER WITH CTA CHEST NEG FOR PE. PT WAS NOTED TO HAVE HYPERTHYROIDISM WITH TSH UNDETECTABLE AND US REVEALING THYROIDITIS AND WILL REQUIRE REFERRAL TO ENDOCRINOLOGY.     PT PARTICIPATED WITH PT/OT PERFORMING BED MOBILITY WITH MINIMUM ASSISTANCE, TRANSFERS WITH MINIMUM ASSISTANCE USING ROLLING WALKER AND AMBULATING  DOWN HALLWAY WITH ASSISTANCE AND DEVICE MANAGEMENT.  PT HAS DC  RA O2 SAT 97%. PT WAS TREATED PER RESPIRATORY THERAPY WITH O2 MONITORING.  PT WAS TREATED WITH CEFTIN FOR UTI DUE TO PROTEUS.THE  PHARMACY MONITORED ALL MEDICATIONS.  PT HAS STABLE LABS ON DC. VIT D IS 10.PT HAD URINE CULTURE RESULTS REVEALING PROTEUS MIRALABIS..      PT WAS ADVISED  THAT SHE IS BEING DISCHARGED TO [facility] IN [place]. PT WAS ADVISED TO INCREASE REST AND FLUIDS. PT WAS ADVISED TO TAKE MEDICATIONS AS PRESCRIBED. PT WAS ADVISED TO TAKE MIRALAX DAILY TO PREVENT CONSTIPATION, TYLENOL FOR PAIN AND MELATONIN FOR SLEEP. PT WAS ADVISED REGARDING FALL PRECAUTIONS. PT WAS ADVISED REGARDING BENZODIAZEPINES ( MEDICATIONS FOR ANXIETY). PT WAS ADVISED THAT LOV[facility]OX WILL BE UP TO NURSING HOME PROVIDER  PT WAS ADVISED THAT WE WILL CALL NEXT WEEK REGARDING APPOINTMENT WITH ENDOCRINOLOGY.

## 2023-10-05 NOTE — ASSESSMENT & PLAN NOTE
PT eval and treat  OT eval and treat  Weekly CBC, BMP  Admit labs CBC, CMP, Vit D, Mag, UA, CXR    09/01/2023  PT PARTICIPATED WITH PT/OT PERFORMING BED MOBILITY WITH MINIMUM ASSISTANCE, TRANSFERS WITH MINIMUM ASSISTANCE USING ROLLING WALKER AND AMBULATING  DOWN HALLWAY WITH ASSISTANCE AND DEVICE MANAGEMENT.

## 2023-10-05 NOTE — DISCHARGE SUMMARY
[facility] - Medical Surgical Unit  Hospital Medicine  Discharge Summary      Patient Name: Kenia Carias  MRN: [id number]  MONIKA: [id number]  Patient Class: IP- Swing  Admission Date: 8/25/2023  Hospital Length of Stay: 7 days  Discharge Date and Time: 9/1/2023 11:30 AM  Attending Physician: Rosemary Woodson MD    Discharging Provider: Rosemary Woodson MD  Primary Care Provider: Farnaz, Primary Doctor    Primary Care Team: Networked reference to record PCT     HPI:   Ms Carias is a 58 y/o AA/F with PMH HTN, intellectual disability and blindness being admitted to [facility] for rehabilitation services for physical deconditioning following a stay at [facility]. She initially present to [facility] with increased weakness, dyspnea and fall. She was found to have elevated Troponin and subsequently transferred to [facility] on 8/20 for further cardiac work-up. Cardiology was consulted for elevated tropoinin levels in the 500s. CT of the head showed no acute evidents. Echo EF 55%. Patient was placed on purewick. She underwent a Left Heart Catheterization on 8/21 that showed no stenosis and/or blockage and was cleared by cardiology on 8/22. D-dimer was done to furthered investigate her initial dyspnea on admission. Results showed slightly elevated at 0.85. Follow up CTA negative for pulmonary embolism. In addition, TSH was noted to be undetectable( <0.005) and free T4 3.34 as a result of hyperthyroidism. Ultrasound of the thyroid showed thyroiditis. Patient will need to follow-up with PCP or Endocrinologist at a later date for radioactive uptake scan for thyroiditis. She will be admitted to [facility] for PT/OT evaluation and treatement, lab monitoring and medications management. Brother spoke with  and notes that they are trying to find long term NH placement at /. He notes that baseline ambulatory status prior to hospitalization was ambulatory to  but  typically stayed in bed. She can feed herself but requires assistance.    Code Status: Full Code         * No surgery found *      Hospital Course:   09/01/2023 HOSPITAL DAY   PT IS A 57 YR OLD BF ADMITTED TO OSH FOR PT/OT/REHABILITATION AFTER ADMISSION TO [facility]  WITH N STEMI WITH LHC REVEALING NO STENOSIS OR BLOCKAGE WITH EF 55% AND ELEVATED D DIMER WITH CTA CHEST NEG FOR PE. PT WAS NOTED TO HAVE HYPERTHYROIDISM WITH TSH UNDETECTABLE AND US REVEALING THYROIDITIS AND WILL REQUIRE REFERRAL TO ENDOCRINOLOGY.     PT PARTICIPATED WITH PT/OT PERFORMING BED MOBILITY WITH MINIMUM ASSISTANCE, TRANSFERS WITH MINIMUM ASSISTANCE USING ROLLING WALKER AND AMBULATING  DOWN HALLWAY WITH ASSISTANCE AND DEVICE MANAGEMENT.  PT HAS DC  RA O2 SAT 97%. PT WAS TREATED PER RESPIRATORY THERAPY WITH O2 MONITORING.  PT WAS TREATED WITH CEFTIN FOR UTI DUE TO PROTEUS.THE  PHARMACY MONITORED ALL MEDICATIONS.  PT HAS STABLE LABS ON DC. VIT D IS 10.PT HAD URINE CULTURE RESULTS REVEALING PROTEUS MIRALABIS..      PT WAS ADVISED  THAT SHE IS BEING DISCHARGED TO [facility] IN [place]. PT WAS ADVISED TO INCREASE REST AND FLUIDS. PT WAS ADVISED TO TAKE MEDICATIONS AS PRESCRIBED. PT WAS ADVISED TO TAKE MIRALAX DAILY TO PREVENT CONSTIPATION, TYLENOL FOR PAIN AND MELATONIN FOR SLEEP. PT WAS ADVISED REGARDING FALL PRECAUTIONS. PT WAS ADVISED REGARDING BENZODIAZEPINES ( MEDICATIONS FOR ANXIETY). PT WAS ADVISED THAT LOVENOX WILL BE UP TO NURSING HOME PROVIDER  PT WAS ADVISED THAT WE WILL CALL NEXT WEEK REGARDING APPOINTMENT WITH ENDOCRINOLOGY.        Goals of Care Treatment Preferences:  Code Status: Full Code      Consults:     Neuro  Mental disability  Assistance with meals  Fall precautions  09/01/2023   CONTINUE CARE AT [facility]      Psychiatric  Episode of recurrent major depressive disorder  09/01/2023  PT HAS DEPRESSION  CONTINUE POC      Anxiety  09/01/2023  PT HAS IMPROVED      Ophtho  Cortical age-related cataract of both eyes  Fall  precautions  Assistance with ADLS  09/01/2023  CONTINUE POC      Cardiac/Vascular  Primary hypertension  PT HAS HTN  CONTINUE ANTIHTN MEDS  LOSARTAN, METOPROLOL   `      Renal/  Acute cystitis without hematuria  09/01/2023  UTI DUE TO PROTEUS  TREATED WITH CEFTIN WITH IMPROVEMENT      Endocrine  Vitamin D deficiency  PT HAS VIT D DEFICIENCY  LEVEL 10.9  CONTINUE VITAMIN D SUPPLEMENTATION      Orthopedic  * Muscle weakness  PT eval and treat  OT eval and treat  Weekly CBC, BMP  Admit labs CBC, CMP, Vit D, Mag, UA, CXR    09/01/2023  PT PARTICIPATED WITH PT/OT PERFORMING BED MOBILITY WITH MINIMUM ASSISTANCE, TRANSFERS WITH MINIMUM ASSISTANCE USING ROLLING WALKER AND AMBULATING  DOWN HALLWAY WITH ASSISTANCE AND DEVICE MANAGEMENT.      Other  Physical deconditioning-resolved as of 10/5/2023  PT eval and treat  OT eval and treat  Weekly CBC, BMP  Admit labs CBC, CMP, Vit D, Mag, UA, CXR      Final Active Diagnoses:    Diagnosis Date Noted POA    PRINCIPAL PROBLEM:  Muscle weakness [M62.81] 09/01/2023 Yes    Vitamin D deficiency [E55.9] 10/05/2023 Yes    Acute cystitis without hematuria [N30.00] 09/01/2023 Yes    Primary hypertension [I10] 09/01/2023 Yes    Anxiety [F41.9] 09/01/2023 Yes    Episode of recurrent major depressive disorder [F33.9] 09/01/2023 Yes    Mental disability [F79] 08/19/2023 Yes    Cortical age-related cataract of both eyes [H25.013] 08/19/2023 Yes      Problems Resolved During this Admission:    Diagnosis Date Noted Date Resolved POA    Physical deconditioning [R53.81] 08/25/2023 10/05/2023 Yes       Discharged Condition: stable    Disposition: Skilled Nursing Facility    Follow Up:   Follow-up Information     Fawad Wood III, DO. Go to.    Specialty: Endocrinology  Why: will call you next week with appointment date and time  Contact information:  2024 15[facility]  5th Floor [address] Tow[address] [place] [address] endocrinology and diabetes center  [facility] [address]  [phone]                        Patient Instructions:      Diet Cardiac   Order Comments: MECHANICAL SOFT DIET WITH CHOPPED MEATS/GRAVY     Notify your health care provider if you experience any of the following:  temperature >100.4     Notify your health care provider if you experience any of the following:  severe uncontrolled pain     Notify your health care provider if you experience any of the following:  increased confusion or weakness     Activity as tolerated       Significant Diagnostic Studies: Labs: All labs within the past 24 hours have been reviewed    Pending Diagnostic Studies:     None         Medications:  Reconciled Home Medications:      Medication List      START taking these medications    ergocalciferol 50,000 unit Cap  Commonly known as: ERGOCALCIFEROL  Take 1 capsule (50,000 Units total) by mouth every 7 days. for 8 doses     famotidine 20 MG tablet  Commonly known as: PEPCID  Take 1 tablet (20 mg total) by mouth 2 (two) times daily.        CHANGE how you take these medications    furosemide 20 MG tablet  Commonly known as: LASIX  Take 1 tablet (20 mg total) by mouth daily as needed (edema).  What changed:   · when to take this  · reasons to take this  · additional instructions        CONTINUE taking these medications    clonazePAM 1 MG tablet  Commonly known as: KlonoPIN  Take 1 tablet (1 mg total) by mouth nightly as needed.     losartan 25 MG tablet  Commonly known as: COZAAR  Take 25 mg by mouth once daily.     metoprolol tartrate 25 MG tablet  Commonly known as: LOPRESSOR  Take 25 mg by mouth 2 (two) times daily.     PaxiL 20 MG tablet  Generic drug: paroxetine  Take 1 tablet by mouth once daily.     rosuvastatin 5 MG tablet  Commonly known as: CRESTOR  Take 5 mg by mouth once daily.        STOP taking these medications    potassium citrate 99 mg Cap        ASK your doctor about these medications    cefUROXime 500 MG tablet  Commonly known as: CEFTIN  Take 1 tablet (500 mg total) by mouth every 12 (twelve) hours.  for 4 days  Ask about: Should I take this medication?     ferrous sulfate 325 mg (65 mg iron) Tab tablet  Commonly known as: IRON  Take 1 tablet (325 mg total) by mouth once daily.  Ask about: Should I take this medication?            Indwelling Lines/Drains at time of discharge:   Lines/Drains/Airways     None                 Time spent on the discharge of patient: 60 minutes         Rosemary Woodson MD  Department of Hospital Medicine  [facility] - Medical Surgical Unit

## 2024-10-08 ENCOUNTER — LAB REQUISITION (OUTPATIENT)
Dept: LAB | Facility: HOSPITAL | Age: 59
End: 2024-10-08
Attending: FAMILY MEDICINE
Payer: MEDICARE

## 2024-10-08 DIAGNOSIS — E05.90 THYROTOXICOSIS, UNSPECIFIED WITHOUT THYROTOXIC CRISIS OR STORM: ICD-10-CM

## 2024-10-08 DIAGNOSIS — R53.1 WEAKNESS: ICD-10-CM

## 2024-10-08 LAB
ALBUMIN SERPL BCP-MCNC: 3.5 G/DL (ref 3.5–5)
ALBUMIN/GLOB SERPL: 0.9 {RATIO}
ALP SERPL-CCNC: 139 U/L (ref 46–118)
ALT SERPL W P-5'-P-CCNC: 37 U/L (ref 13–56)
ANION GAP SERPL CALCULATED.3IONS-SCNC: 8 MMOL/L (ref 7–16)
AST SERPL W P-5'-P-CCNC: 18 U/L (ref 15–37)
BILIRUB SERPL-MCNC: 0.5 MG/DL (ref ?–1.2)
BUN SERPL-MCNC: 11 MG/DL (ref 7–18)
BUN/CREAT SERPL: 15 (ref 6–20)
CALCIUM SERPL-MCNC: 9.1 MG/DL (ref 8.5–10.1)
CHLORIDE SERPL-SCNC: 110 MMOL/L (ref 98–107)
CO2 SERPL-SCNC: 29 MMOL/L (ref 21–32)
CREAT SERPL-MCNC: 0.74 MG/DL (ref 0.55–1.02)
EGFR (NO RACE VARIABLE) (RUSH/TITUS): 95 ML/MIN/1.73M2
GLOBULIN SER-MCNC: 4 G/DL (ref 2–4)
GLUCOSE SERPL-MCNC: 99 MG/DL (ref 74–106)
POTASSIUM SERPL-SCNC: 4.1 MMOL/L (ref 3.5–5.1)
PROT SERPL-MCNC: 7.5 G/DL (ref 6.4–8.2)
SODIUM SERPL-SCNC: 143 MMOL/L (ref 136–145)
T3 SERPL-MCNC: 78 NG/DL (ref 60–181)
T4 FREE SERPL-MCNC: 0.74 NG/DL (ref 0.76–1.46)
TSH SERPL DL<=0.005 MIU/L-ACNC: 6.84 UIU/ML (ref 0.36–3.74)

## 2024-10-08 PROCEDURE — 84439 ASSAY OF FREE THYROXINE: CPT | Performed by: FAMILY MEDICINE

## 2024-10-08 PROCEDURE — 84443 ASSAY THYROID STIM HORMONE: CPT | Performed by: FAMILY MEDICINE

## 2024-10-08 PROCEDURE — 80053 COMPREHEN METABOLIC PANEL: CPT | Performed by: FAMILY MEDICINE

## 2024-10-08 PROCEDURE — 84480 ASSAY TRIIODOTHYRONINE (T3): CPT | Performed by: FAMILY MEDICINE

## 2025-01-21 ENCOUNTER — LAB REQUISITION (OUTPATIENT)
Dept: LAB | Facility: HOSPITAL | Age: 60
End: 2025-01-21
Attending: FAMILY MEDICINE
Payer: MEDICARE

## 2025-01-21 DIAGNOSIS — E05.90 THYROTOXICOSIS, UNSPECIFIED WITHOUT THYROTOXIC CRISIS OR STORM: ICD-10-CM

## 2025-01-21 DIAGNOSIS — R53.1 WEAKNESS: ICD-10-CM

## 2025-01-21 LAB
ALBUMIN SERPL BCP-MCNC: 3.6 G/DL (ref 3.5–5)
ALBUMIN/GLOB SERPL: 0.9 {RATIO}
ALP SERPL-CCNC: 142 U/L (ref 40–150)
ALT SERPL W P-5'-P-CCNC: 17 U/L
ANION GAP SERPL CALCULATED.3IONS-SCNC: 12 MMOL/L (ref 7–16)
AST SERPL W P-5'-P-CCNC: 22 U/L (ref 5–34)
BILIRUB SERPL-MCNC: 0.7 MG/DL
BUN SERPL-MCNC: 6 MG/DL (ref 10–20)
BUN/CREAT SERPL: 9 (ref 6–20)
CALCIUM SERPL-MCNC: 9.7 MG/DL (ref 8.4–10.2)
CHLORIDE SERPL-SCNC: 111 MMOL/L (ref 98–107)
CO2 SERPL-SCNC: 25 MMOL/L (ref 22–29)
CREAT SERPL-MCNC: 0.69 MG/DL (ref 0.55–1.02)
EGFR (NO RACE VARIABLE) (RUSH/TITUS): 101 ML/MIN/1.73M2
GLOBULIN SER-MCNC: 3.8 G/DL (ref 2–4)
GLUCOSE SERPL-MCNC: 88 MG/DL (ref 74–100)
POTASSIUM SERPL-SCNC: 4.1 MMOL/L (ref 3.5–5.1)
PROT SERPL-MCNC: 7.4 G/DL (ref 6.4–8.3)
SODIUM SERPL-SCNC: 144 MMOL/L (ref 136–145)
TSH SERPL DL<=0.005 MIU/L-ACNC: 0.19 UIU/ML (ref 0.35–4.94)

## 2025-01-21 PROCEDURE — 80053 COMPREHEN METABOLIC PANEL: CPT | Performed by: FAMILY MEDICINE

## 2025-01-21 PROCEDURE — 84443 ASSAY THYROID STIM HORMONE: CPT | Performed by: FAMILY MEDICINE

## 2025-01-21 PROCEDURE — 84436 ASSAY OF TOTAL THYROXINE: CPT | Performed by: FAMILY MEDICINE

## 2025-01-21 PROCEDURE — 84480 ASSAY TRIIODOTHYRONINE (T3): CPT | Performed by: FAMILY MEDICINE

## 2025-01-22 LAB
T3 SERPL-MCNC: 109 NG/DL (ref 60–180)
T4 SERPL-MCNC: 9.4 ΜG/DL (ref 4.9–11.7)

## 2025-03-14 ENCOUNTER — OUTSIDE PLACE OF SERVICE (OUTPATIENT)
Dept: FAMILY MEDICINE | Facility: CLINIC | Age: 60
End: 2025-03-14
Payer: MEDICARE

## 2025-04-18 ENCOUNTER — OUTSIDE PLACE OF SERVICE (OUTPATIENT)
Dept: FAMILY MEDICINE | Facility: CLINIC | Age: 60
End: 2025-04-18
Payer: MEDICARE

## 2025-04-21 ENCOUNTER — LAB REQUISITION (OUTPATIENT)
Dept: LAB | Facility: HOSPITAL | Age: 60
End: 2025-04-21
Attending: FAMILY MEDICINE
Payer: MEDICARE

## 2025-04-21 DIAGNOSIS — E05.90 THYROTOXICOSIS, UNSPECIFIED WITHOUT THYROTOXIC CRISIS OR STORM: ICD-10-CM

## 2025-04-21 DIAGNOSIS — F71 MODERATE INTELLECTUAL DISABILITIES: ICD-10-CM

## 2025-04-21 DIAGNOSIS — I10 ESSENTIAL (PRIMARY) HYPERTENSION: ICD-10-CM

## 2025-04-21 DIAGNOSIS — F41.1 GENERALIZED ANXIETY DISORDER: ICD-10-CM

## 2025-04-21 LAB
ALBUMIN SERPL BCP-MCNC: 3 G/DL (ref 3.5–5)
ALBUMIN/GLOB SERPL: 0.9 {RATIO}
ALP SERPL-CCNC: 114 U/L (ref 40–150)
ALT SERPL W P-5'-P-CCNC: 12 U/L
ANION GAP SERPL CALCULATED.3IONS-SCNC: 11 MMOL/L (ref 7–16)
AST SERPL W P-5'-P-CCNC: 20 U/L (ref 11–45)
BASOPHILS # BLD AUTO: 0.02 K/UL (ref 0–0.2)
BASOPHILS NFR BLD AUTO: 0.4 % (ref 0–1)
BILIRUB SERPL-MCNC: 0.6 MG/DL
BUN SERPL-MCNC: 9 MG/DL (ref 10–20)
BUN/CREAT SERPL: 13 (ref 6–20)
CALCIUM SERPL-MCNC: 8.5 MG/DL (ref 8.4–10.2)
CHLORIDE SERPL-SCNC: 114 MMOL/L (ref 98–107)
CO2 SERPL-SCNC: 22 MMOL/L (ref 22–29)
CREAT SERPL-MCNC: 0.67 MG/DL (ref 0.55–1.02)
DIFFERENTIAL METHOD BLD: ABNORMAL
EGFR (NO RACE VARIABLE) (RUSH/TITUS): 102 ML/MIN/1.73M2
EOSINOPHIL # BLD AUTO: 0.24 K/UL (ref 0–0.5)
EOSINOPHIL NFR BLD AUTO: 4.8 % (ref 1–4)
ERYTHROCYTE [DISTWIDTH] IN BLOOD BY AUTOMATED COUNT: 14.4 % (ref 11.5–14.5)
GLOBULIN SER-MCNC: 3.4 G/DL (ref 2–4)
GLUCOSE SERPL-MCNC: 108 MG/DL (ref 74–100)
HCT VFR BLD AUTO: 40.3 % (ref 38–47)
HGB BLD-MCNC: 12.5 G/DL (ref 12–16)
IMM GRANULOCYTES # BLD AUTO: 0.01 K/UL (ref 0–0.04)
IMM GRANULOCYTES NFR BLD: 0.2 % (ref 0–0.4)
LYMPHOCYTES # BLD AUTO: 1.38 K/UL (ref 1–4.8)
LYMPHOCYTES NFR BLD AUTO: 27.5 % (ref 27–41)
MCH RBC QN AUTO: 26.1 PG (ref 27–31)
MCHC RBC AUTO-ENTMCNC: 31 G/DL (ref 32–36)
MCV RBC AUTO: 84.1 FL (ref 80–96)
MONOCYTES # BLD AUTO: 0.34 K/UL (ref 0–0.8)
MONOCYTES NFR BLD AUTO: 6.8 % (ref 2–6)
MPC BLD CALC-MCNC: 10.6 FL (ref 9.4–12.4)
NEUTROPHILS # BLD AUTO: 3.02 K/UL (ref 1.8–7.7)
NEUTROPHILS NFR BLD AUTO: 60.3 % (ref 53–65)
NRBC # BLD AUTO: 0 X10E3/UL
NRBC, AUTO (.00): 0 %
PLATELET # BLD AUTO: 287 K/UL (ref 150–400)
POTASSIUM SERPL-SCNC: 3.8 MMOL/L (ref 3.5–5.1)
PROT SERPL-MCNC: 6.4 G/DL (ref 6.4–8.3)
RBC # BLD AUTO: 4.79 M/UL (ref 4.2–5.4)
SODIUM SERPL-SCNC: 143 MMOL/L (ref 136–145)
T3 SERPL-MCNC: 167 NG/DL (ref 60–180)
T4 SERPL-MCNC: 16.8 ΜG/DL (ref 4.9–11.7)
TSH SERPL DL<=0.005 MIU/L-ACNC: <0.008 UIU/ML (ref 0.35–4.94)
WBC # BLD AUTO: 5.01 K/UL (ref 4.5–11)

## 2025-04-21 PROCEDURE — 80053 COMPREHEN METABOLIC PANEL: CPT | Performed by: FAMILY MEDICINE

## 2025-04-21 PROCEDURE — 84480 ASSAY TRIIODOTHYRONINE (T3): CPT | Performed by: FAMILY MEDICINE

## 2025-04-21 PROCEDURE — 84436 ASSAY OF TOTAL THYROXINE: CPT | Performed by: FAMILY MEDICINE

## 2025-04-21 PROCEDURE — 84443 ASSAY THYROID STIM HORMONE: CPT | Performed by: FAMILY MEDICINE

## 2025-04-21 PROCEDURE — 85025 COMPLETE CBC W/AUTO DIFF WBC: CPT | Performed by: FAMILY MEDICINE

## 2025-04-27 PROCEDURE — 81003 URINALYSIS AUTO W/O SCOPE: CPT | Performed by: FAMILY MEDICINE

## 2025-04-28 ENCOUNTER — LAB REQUISITION (OUTPATIENT)
Dept: LAB | Facility: HOSPITAL | Age: 60
End: 2025-04-28
Attending: FAMILY MEDICINE
Payer: MEDICARE

## 2025-04-28 DIAGNOSIS — R41.82 ALTERED MENTAL STATUS, UNSPECIFIED: ICD-10-CM

## 2025-04-28 LAB
AMORPH PHOS CRY #/AREA URNS LPF: ABNORMAL /LPF
BACTERIA #/AREA URNS HPF: ABNORMAL /HPF
BILIRUB UR QL STRIP: NEGATIVE
CAOX CRY #/AREA URNS LPF: ABNORMAL /LPF
CLARITY UR: ABNORMAL
COLOR UR: YELLOW
GLUCOSE UR STRIP-MCNC: NEGATIVE MG/DL
KETONES UR STRIP-SCNC: NEGATIVE MG/DL
LEUKOCYTE ESTERASE UR QL STRIP: NEGATIVE
NITRITE UR QL STRIP: NEGATIVE
PH UR STRIP: 6 PH UNITS
PROT UR QL STRIP: NEGATIVE
RBC # UR STRIP: ABNORMAL /UL
RBC #/AREA URNS HPF: ABNORMAL /HPF
SP GR UR STRIP: >=1.03
SQUAMOUS #/AREA URNS LPF: ABNORMAL /LPF
UROBILINOGEN UR STRIP-ACNC: 1 MG/DL
WBC #/AREA URNS HPF: ABNORMAL /HPF

## 2025-05-16 ENCOUNTER — OUTSIDE PLACE OF SERVICE (OUTPATIENT)
Dept: FAMILY MEDICINE | Facility: CLINIC | Age: 60
End: 2025-05-16
Payer: MEDICARE

## 2025-05-16 PROCEDURE — 99309 SBSQ NF CARE MODERATE MDM 30: CPT | Mod: ,,, | Performed by: FAMILY MEDICINE

## 2025-05-19 PROCEDURE — 81003 URINALYSIS AUTO W/O SCOPE: CPT | Performed by: FAMILY MEDICINE

## 2025-05-19 PROCEDURE — 87077 CULTURE AEROBIC IDENTIFY: CPT | Performed by: FAMILY MEDICINE

## 2025-05-20 ENCOUNTER — LAB REQUISITION (OUTPATIENT)
Dept: LAB | Facility: HOSPITAL | Age: 60
End: 2025-05-20
Attending: FAMILY MEDICINE
Payer: MEDICARE

## 2025-05-20 DIAGNOSIS — N39.0 URINARY TRACT INFECTION, SITE NOT SPECIFIED: ICD-10-CM

## 2025-05-20 LAB
BACTERIA #/AREA URNS HPF: ABNORMAL /HPF
BILIRUB UR QL STRIP: NEGATIVE
CLARITY UR: ABNORMAL
COLOR UR: YELLOW
GLUCOSE UR STRIP-MCNC: NEGATIVE MG/DL
KETONES UR STRIP-SCNC: NEGATIVE MG/DL
LEUKOCYTE ESTERASE UR QL STRIP: NEGATIVE
NITRITE UR QL STRIP: POSITIVE
PH UR STRIP: 6.5 PH UNITS
PROT UR QL STRIP: NEGATIVE
RBC # UR STRIP: NEGATIVE /UL
RBC #/AREA URNS HPF: ABNORMAL /HPF
SP GR UR STRIP: 1.02
SQUAMOUS #/AREA URNS LPF: ABNORMAL /LPF
UROBILINOGEN UR STRIP-ACNC: 2 MG/DL
WBC #/AREA URNS HPF: ABNORMAL /HPF
YEAST #/AREA URNS HPF: ABNORMAL /HPF

## 2025-05-22 LAB — UA COMPLETE W REFLEX CULTURE PNL UR: ABNORMAL

## 2025-06-20 ENCOUNTER — OUTSIDE PLACE OF SERVICE (OUTPATIENT)
Dept: FAMILY MEDICINE | Facility: CLINIC | Age: 60
End: 2025-06-20
Payer: MEDICARE

## 2025-06-20 ENCOUNTER — LAB REQUISITION (OUTPATIENT)
Dept: LAB | Facility: HOSPITAL | Age: 60
End: 2025-06-20
Attending: FAMILY MEDICINE
Payer: MEDICARE

## 2025-06-20 DIAGNOSIS — E05.90 THYROTOXICOSIS, UNSPECIFIED WITHOUT THYROTOXIC CRISIS OR STORM: ICD-10-CM

## 2025-06-20 DIAGNOSIS — R63.4 ABNORMAL WEIGHT LOSS: ICD-10-CM

## 2025-06-20 LAB
PREALB SERPL NEPH-MCNC: <3 MG/DL (ref 16–38)
T3 SERPL-MCNC: 312 NG/DL (ref 60–180)
T4 SERPL-MCNC: >24 ΜG/DL (ref 4.9–11.7)
TSH SERPL DL<=0.005 MIU/L-ACNC: <0.008 UIU/ML (ref 0.35–4.94)

## 2025-06-20 PROCEDURE — 84443 ASSAY THYROID STIM HORMONE: CPT | Performed by: FAMILY MEDICINE

## 2025-06-20 PROCEDURE — 84480 ASSAY TRIIODOTHYRONINE (T3): CPT | Performed by: FAMILY MEDICINE

## 2025-06-20 PROCEDURE — 99309 SBSQ NF CARE MODERATE MDM 30: CPT | Mod: ,,, | Performed by: FAMILY MEDICINE

## 2025-06-20 PROCEDURE — 84436 ASSAY OF TOTAL THYROXINE: CPT | Performed by: FAMILY MEDICINE

## 2025-06-20 PROCEDURE — 84134 ASSAY OF PREALBUMIN: CPT | Performed by: FAMILY MEDICINE

## 2025-07-08 ENCOUNTER — LAB REQUISITION (OUTPATIENT)
Dept: LAB | Facility: HOSPITAL | Age: 60
End: 2025-07-08
Attending: FAMILY MEDICINE
Payer: MEDICARE

## 2025-07-08 DIAGNOSIS — E78.5 HYPERLIPIDEMIA, UNSPECIFIED: ICD-10-CM

## 2025-07-08 DIAGNOSIS — M62.81 MUSCLE WEAKNESS (GENERALIZED): ICD-10-CM

## 2025-07-08 LAB
ALBUMIN SERPL BCP-MCNC: 3.1 G/DL (ref 3.5–5)
ALBUMIN/GLOB SERPL: 1 {RATIO}
ALP SERPL-CCNC: 110 U/L (ref 40–150)
ALT SERPL W P-5'-P-CCNC: 25 U/L
ANION GAP SERPL CALCULATED.3IONS-SCNC: 13 MMOL/L (ref 7–16)
AST SERPL W P-5'-P-CCNC: 22 U/L (ref 11–45)
BILIRUB SERPL-MCNC: 1.5 MG/DL
BUN SERPL-MCNC: 7 MG/DL (ref 10–20)
BUN/CREAT SERPL: 12 (ref 6–20)
CALCIUM SERPL-MCNC: 8.9 MG/DL (ref 8.4–10.2)
CHLORIDE SERPL-SCNC: 112 MMOL/L (ref 98–107)
CO2 SERPL-SCNC: 22 MMOL/L (ref 22–29)
CREAT SERPL-MCNC: 0.59 MG/DL (ref 0.55–1.02)
EGFR (NO RACE VARIABLE) (RUSH/TITUS): 105 ML/MIN/1.73M2
GLOBULIN SER-MCNC: 3.1 G/DL (ref 2–4)
GLUCOSE SERPL-MCNC: 94 MG/DL (ref 74–100)
POTASSIUM SERPL-SCNC: 3.8 MMOL/L (ref 3.5–5.1)
PROT SERPL-MCNC: 6.2 G/DL (ref 6.4–8.3)
SODIUM SERPL-SCNC: 143 MMOL/L (ref 136–145)

## 2025-07-08 PROCEDURE — 80053 COMPREHEN METABOLIC PANEL: CPT | Performed by: FAMILY MEDICINE

## 2025-07-16 ENCOUNTER — LAB REQUISITION (OUTPATIENT)
Dept: LAB | Facility: HOSPITAL | Age: 60
End: 2025-07-16
Attending: FAMILY MEDICINE
Payer: MEDICARE

## 2025-07-16 DIAGNOSIS — E05.90 THYROTOXICOSIS, UNSPECIFIED WITHOUT THYROTOXIC CRISIS OR STORM: ICD-10-CM

## 2025-07-16 LAB
T4 FREE SERPL-MCNC: 1.89 NG/DL (ref 0.7–1.48)
TSH SERPL DL<=0.005 MIU/L-ACNC: <0.008 UIU/ML (ref 0.35–4.94)

## 2025-07-16 PROCEDURE — 84436 ASSAY OF TOTAL THYROXINE: CPT | Performed by: FAMILY MEDICINE

## 2025-07-16 PROCEDURE — 84439 ASSAY OF FREE THYROXINE: CPT | Performed by: FAMILY MEDICINE

## 2025-07-16 PROCEDURE — 84443 ASSAY THYROID STIM HORMONE: CPT | Performed by: FAMILY MEDICINE

## 2025-07-16 PROCEDURE — 84480 ASSAY TRIIODOTHYRONINE (T3): CPT | Performed by: FAMILY MEDICINE

## 2025-07-17 LAB
T3 SERPL-MCNC: 234 NG/DL (ref 60–180)
T4 SERPL-MCNC: 21.9 ΜG/DL (ref 4.9–11.7)

## 2025-07-18 ENCOUNTER — OUTSIDE PLACE OF SERVICE (OUTPATIENT)
Dept: FAMILY MEDICINE | Facility: CLINIC | Age: 60
End: 2025-07-18
Payer: MEDICARE

## 2025-07-18 PROCEDURE — 99309 SBSQ NF CARE MODERATE MDM 30: CPT | Mod: ,,, | Performed by: FAMILY MEDICINE

## 2025-07-19 ENCOUNTER — LAB REQUISITION (OUTPATIENT)
Dept: LAB | Facility: HOSPITAL | Age: 60
End: 2025-07-19
Attending: FAMILY MEDICINE
Payer: MEDICARE

## 2025-07-19 DIAGNOSIS — F41.1 GENERALIZED ANXIETY DISORDER: ICD-10-CM

## 2025-07-19 DIAGNOSIS — I10 ESSENTIAL (PRIMARY) HYPERTENSION: ICD-10-CM

## 2025-07-19 DIAGNOSIS — F33.0 MAJOR DEPRESSIVE DISORDER, RECURRENT, MILD: ICD-10-CM

## 2025-07-19 DIAGNOSIS — E05.90 THYROTOXICOSIS, UNSPECIFIED WITHOUT THYROTOXIC CRISIS OR STORM: ICD-10-CM

## 2025-07-19 LAB
AMYLASE SERPL-CCNC: 49 U/L (ref 25–125)
CK SERPL-CCNC: 66 U/L (ref 29–168)
LIPASE SERPL-CCNC: 12 U/L

## 2025-07-19 PROCEDURE — 82550 ASSAY OF CK (CPK): CPT

## 2025-07-19 PROCEDURE — 83690 ASSAY OF LIPASE: CPT

## 2025-07-19 PROCEDURE — 82150 ASSAY OF AMYLASE: CPT

## 2025-08-22 ENCOUNTER — OUTSIDE PLACE OF SERVICE (OUTPATIENT)
Dept: FAMILY MEDICINE | Facility: CLINIC | Age: 60
End: 2025-08-22
Payer: MEDICARE

## 2025-08-22 PROCEDURE — 99309 SBSQ NF CARE MODERATE MDM 30: CPT | Mod: ,,, | Performed by: FAMILY MEDICINE

## (undated) DEVICE — PROTECTOR ULNAR NERVE FOAM

## (undated) DEVICE — SET IV PRIMARY

## (undated) DEVICE — GLOVE PROTEXIS PI CRM 5.5

## (undated) DEVICE — DRESSING TRANS 4X4 TEGADERM

## (undated) DEVICE — ETCO2 NC MICROSTR FEM ST ADLT

## (undated) DEVICE — SHEATH INTRODUCER 6FR 11CM

## (undated) DEVICE — SET EXTENSION CLEARLINK 2INJ

## (undated) DEVICE — CHLORAPREP 10.5 ML APPLICATOR

## (undated) DEVICE — CONTRAST ISOVUE 370 100ML

## (undated) DEVICE — CATH DIAG IMPULSE 6FR FR4

## (undated) DEVICE — Device

## (undated) DEVICE — CLIPPER BLADE MOD 4406 (CAREF)

## (undated) DEVICE — CATH DIAG IMPULSE 6FR FL4

## (undated) DEVICE — GLOVE PROTEXIS PI CRM 8

## (undated) DEVICE — INTRODUCER KIT MICRO 4FR

## (undated) DEVICE — TOWEL OR DISP STRL BLUE 4/PK

## (undated) DEVICE — OXISENSOR ADULT DIGIT N/S

## (undated) DEVICE — DECANTER FLUID TRNSF WHITE 9IN